# Patient Record
Sex: FEMALE | Race: WHITE | NOT HISPANIC OR LATINO | URBAN - METROPOLITAN AREA
[De-identification: names, ages, dates, MRNs, and addresses within clinical notes are randomized per-mention and may not be internally consistent; named-entity substitution may affect disease eponyms.]

---

## 2017-02-27 ENCOUNTER — INPATIENT (INPATIENT)
Facility: HOSPITAL | Age: 82
LOS: 8 days | Discharge: HOME | DRG: 640 | End: 2017-03-08
Attending: GENERAL ACUTE CARE HOSPITAL | Admitting: GENERAL ACUTE CARE HOSPITAL
Payer: MEDICARE

## 2017-02-27 VITALS
OXYGEN SATURATION: 96 % | DIASTOLIC BLOOD PRESSURE: 81 MMHG | HEART RATE: 80 BPM | TEMPERATURE: 98 F | SYSTOLIC BLOOD PRESSURE: 163 MMHG | RESPIRATION RATE: 20 BRPM

## 2017-02-27 LAB
ALBUMIN SERPL ELPH-MCNC: 3.7 G/DL — SIGNIFICANT CHANGE UP (ref 3.3–5)
ALP SERPL-CCNC: 51 U/L — SIGNIFICANT CHANGE UP (ref 40–120)
ALT FLD-CCNC: 15 U/L RC — SIGNIFICANT CHANGE UP (ref 10–45)
ANION GAP SERPL CALC-SCNC: 12 MMOL/L — SIGNIFICANT CHANGE UP (ref 5–17)
AST SERPL-CCNC: 23 U/L — SIGNIFICANT CHANGE UP (ref 10–40)
BASOPHILS # BLD AUTO: 0 K/UL — SIGNIFICANT CHANGE UP (ref 0–0.2)
BASOPHILS NFR BLD AUTO: 0.3 % — SIGNIFICANT CHANGE UP (ref 0–2)
BILIRUB SERPL-MCNC: 0.1 MG/DL — LOW (ref 0.2–1.2)
BUN SERPL-MCNC: 16 MG/DL — SIGNIFICANT CHANGE UP (ref 7–23)
CALCIUM SERPL-MCNC: 9.5 MG/DL — SIGNIFICANT CHANGE UP (ref 8.4–10.5)
CHLORIDE SERPL-SCNC: 104 MMOL/L — SIGNIFICANT CHANGE UP (ref 96–108)
CO2 SERPL-SCNC: 27 MMOL/L — SIGNIFICANT CHANGE UP (ref 22–31)
CREAT SERPL-MCNC: 0.64 MG/DL — SIGNIFICANT CHANGE UP (ref 0.5–1.3)
EOSINOPHIL # BLD AUTO: 0.2 K/UL — SIGNIFICANT CHANGE UP (ref 0–0.5)
EOSINOPHIL NFR BLD AUTO: 1.7 % — SIGNIFICANT CHANGE UP (ref 0–6)
GLUCOSE SERPL-MCNC: 127 MG/DL — HIGH (ref 70–99)
HCT VFR BLD CALC: 35.6 % — SIGNIFICANT CHANGE UP (ref 34.5–45)
HGB BLD-MCNC: 12.1 G/DL — SIGNIFICANT CHANGE UP (ref 11.5–15.5)
LYMPHOCYTES # BLD AUTO: 1.9 K/UL — SIGNIFICANT CHANGE UP (ref 1–3.3)
LYMPHOCYTES # BLD AUTO: 21.5 % — SIGNIFICANT CHANGE UP (ref 13–44)
MCHC RBC-ENTMCNC: 31.4 PG — SIGNIFICANT CHANGE UP (ref 27–34)
MCHC RBC-ENTMCNC: 33.9 GM/DL — SIGNIFICANT CHANGE UP (ref 32–36)
MCV RBC AUTO: 92.5 FL — SIGNIFICANT CHANGE UP (ref 80–100)
MONOCYTES # BLD AUTO: 0.8 K/UL — SIGNIFICANT CHANGE UP (ref 0–0.9)
MONOCYTES NFR BLD AUTO: 9.4 % — SIGNIFICANT CHANGE UP (ref 2–14)
NEUTROPHILS # BLD AUTO: 5.8 K/UL — SIGNIFICANT CHANGE UP (ref 1.8–7.4)
NEUTROPHILS NFR BLD AUTO: 67.1 % — SIGNIFICANT CHANGE UP (ref 43–77)
PLATELET # BLD AUTO: 219 K/UL — SIGNIFICANT CHANGE UP (ref 150–400)
POTASSIUM SERPL-MCNC: 4.4 MMOL/L — SIGNIFICANT CHANGE UP (ref 3.5–5.3)
POTASSIUM SERPL-SCNC: 4.4 MMOL/L — SIGNIFICANT CHANGE UP (ref 3.5–5.3)
PROT SERPL-MCNC: 6.5 G/DL — SIGNIFICANT CHANGE UP (ref 6–8.3)
RBC # BLD: 3.85 M/UL — SIGNIFICANT CHANGE UP (ref 3.8–5.2)
RBC # FLD: 12.5 % — SIGNIFICANT CHANGE UP (ref 10.3–14.5)
SODIUM SERPL-SCNC: 143 MMOL/L — SIGNIFICANT CHANGE UP (ref 135–145)
WBC # BLD: 8.7 K/UL — SIGNIFICANT CHANGE UP (ref 3.8–10.5)
WBC # FLD AUTO: 8.7 K/UL — SIGNIFICANT CHANGE UP (ref 3.8–10.5)

## 2017-02-27 PROCEDURE — 93010 ELECTROCARDIOGRAM REPORT: CPT

## 2017-02-27 PROCEDURE — 71010: CPT | Mod: 26

## 2017-02-27 PROCEDURE — 99285 EMERGENCY DEPT VISIT HI MDM: CPT | Mod: 25

## 2017-02-27 RX ORDER — SODIUM CHLORIDE 9 MG/ML
1000 INJECTION INTRAMUSCULAR; INTRAVENOUS; SUBCUTANEOUS
Qty: 0 | Refills: 0 | Status: DISCONTINUED | OUTPATIENT
Start: 2017-02-27 | End: 2017-02-28

## 2017-02-27 RX ORDER — ACETAMINOPHEN 500 MG
1000 TABLET ORAL ONCE
Qty: 0 | Refills: 0 | Status: COMPLETED | OUTPATIENT
Start: 2017-02-27 | End: 2017-02-27

## 2017-02-27 NOTE — ED PROVIDER NOTE - OBJECTIVE STATEMENT
86 year old female with past medical history of Diabetes Mellitus Type 2, Hypertension, cerebrovascular accident with right sided weakness presents to the Emergency Department complaining of left sided headache. Patient has an aide from 09:00 to 13:00 and her primary caretaker is her . However, her  is being evaluated in the Emergency Department and so comes in with him because she has no caretaker at home.  Denies fever, chills, chest pain, shortness of breath, abdominal pain, nausea, vomiting, diarrhea, or any other complaints.  PMD: Arslan Estrella ()

## 2017-02-27 NOTE — ED PROVIDER NOTE - NS ED MD SCRIBE ATTENDING SCRIBE SECTIONS
PAST MEDICAL/SURGICAL/SOCIAL HISTORY/HIV/DISPOSITION/VITAL SIGNS( Pullset)/PHYSICAL EXAM/HISTORY OF PRESENT ILLNESS/REVIEW OF SYSTEMS

## 2017-02-27 NOTE — ED PROVIDER NOTE - MEDICAL DECISION MAKING DETAILS
86 year old female presents with headache. Comes in because her  is in the Emergency Department who is her primary caretaker. Plan: CT Head, EKG, Chest X-Ray, labs, LLE US, discuss with PMD, TBA unattached.

## 2017-02-27 NOTE — ED PROVIDER NOTE - NEUROLOGICAL, MLM
Alert and oriented, no focal deficits, no motor or sensory deficits. Alert and oriented, no focal deficits, no motor or sensory deficits. CN grossly intact. 5/5 str to bilateral UE and LE, 5/5 sensory to bilateral UE and LE

## 2017-02-27 NOTE — ED PROVIDER NOTE - PROGRESS NOTE DETAILS
Discussed case with Dr. Gatica who will accept patient under his care. ***Galo Gilmore MD*** Patient will not accept DVT study at this time. Risks, benefits and alternative therapies/imaging have been discussed with patient in detail. Patient declines the therapy/test and is aware of the potential consequences. Patient will follow up with their primary physician.

## 2017-02-27 NOTE — ED PROVIDER NOTE - CARE PLAN
Principal Discharge DX:	Acute nonintractable headache, unspecified headache type  Secondary Diagnosis:	Activity of daily living alteration

## 2017-02-27 NOTE — ED PROVIDER NOTE - DETAILS:
Galo Gilmore MD note: The scribe's documentation has been prepared under my direction and personally reviewed by me.  I confirm that the note above accurately reflects my work, treatment, procedures, and medical decision making.

## 2017-02-27 NOTE — ED PROVIDER NOTE - PSYCHIATRIC, MLM
Alert and oriented to person, place, time/situation. normal mood and affect. Patient with capacity and insight into situation, treatment, risks, benefits, alternative therapies, and understands they can ask any questions if needed.

## 2017-02-27 NOTE — ED PROVIDER NOTE - MUSCULOSKELETAL, MLM
Spine appears normal, range of motion is not limited, no muscle or joint tenderness. Slight LLE swelling greater than RLE.

## 2017-02-28 DIAGNOSIS — R53.81 OTHER MALAISE: ICD-10-CM

## 2017-02-28 DIAGNOSIS — Z98.890 OTHER SPECIFIED POSTPROCEDURAL STATES: Chronic | ICD-10-CM

## 2017-02-28 DIAGNOSIS — Z29.9 ENCOUNTER FOR PROPHYLACTIC MEASURES, UNSPECIFIED: ICD-10-CM

## 2017-02-28 DIAGNOSIS — R51 HEADACHE: ICD-10-CM

## 2017-02-28 DIAGNOSIS — I63.9 CEREBRAL INFARCTION, UNSPECIFIED: ICD-10-CM

## 2017-02-28 DIAGNOSIS — N20.0 CALCULUS OF KIDNEY: Chronic | ICD-10-CM

## 2017-02-28 DIAGNOSIS — R60.0 LOCALIZED EDEMA: ICD-10-CM

## 2017-02-28 DIAGNOSIS — E11.9 TYPE 2 DIABETES MELLITUS WITHOUT COMPLICATIONS: ICD-10-CM

## 2017-02-28 DIAGNOSIS — R63.8 OTHER SYMPTOMS AND SIGNS CONCERNING FOOD AND FLUID INTAKE: ICD-10-CM

## 2017-02-28 LAB — HBA1C BLD-MCNC: 5.5 % — SIGNIFICANT CHANGE UP (ref 4–5.6)

## 2017-02-28 PROCEDURE — 99223 1ST HOSP IP/OBS HIGH 75: CPT

## 2017-02-28 RX ORDER — ACETAMINOPHEN 500 MG
650 TABLET ORAL ONCE
Qty: 0 | Refills: 0 | Status: COMPLETED | OUTPATIENT
Start: 2017-02-28 | End: 2017-02-28

## 2017-02-28 RX ORDER — DEXTROSE 50 % IN WATER 50 %
1 SYRINGE (ML) INTRAVENOUS ONCE
Qty: 0 | Refills: 0 | Status: DISCONTINUED | OUTPATIENT
Start: 2017-02-28 | End: 2017-03-05

## 2017-02-28 RX ORDER — LOSARTAN POTASSIUM 100 MG/1
25 TABLET, FILM COATED ORAL DAILY
Qty: 0 | Refills: 0 | Status: DISCONTINUED | OUTPATIENT
Start: 2017-02-28 | End: 2017-03-05

## 2017-02-28 RX ORDER — DEXTROSE 50 % IN WATER 50 %
25 SYRINGE (ML) INTRAVENOUS ONCE
Qty: 0 | Refills: 0 | Status: DISCONTINUED | OUTPATIENT
Start: 2017-02-28 | End: 2017-03-08

## 2017-02-28 RX ORDER — HEPARIN SODIUM 5000 [USP'U]/ML
5000 INJECTION INTRAVENOUS; SUBCUTANEOUS EVERY 12 HOURS
Qty: 0 | Refills: 0 | Status: DISCONTINUED | OUTPATIENT
Start: 2017-02-28 | End: 2017-03-08

## 2017-02-28 RX ORDER — INSULIN LISPRO 100/ML
VIAL (ML) SUBCUTANEOUS
Qty: 0 | Refills: 0 | Status: DISCONTINUED | OUTPATIENT
Start: 2017-02-28 | End: 2017-03-05

## 2017-02-28 RX ORDER — NYSTATIN CREAM 100000 [USP'U]/G
1 CREAM TOPICAL EVERY 8 HOURS
Qty: 0 | Refills: 0 | Status: DISCONTINUED | OUTPATIENT
Start: 2017-02-28 | End: 2017-03-08

## 2017-02-28 RX ORDER — DEXTROSE 50 % IN WATER 50 %
12.5 SYRINGE (ML) INTRAVENOUS ONCE
Qty: 0 | Refills: 0 | Status: DISCONTINUED | OUTPATIENT
Start: 2017-02-28 | End: 2017-03-08

## 2017-02-28 RX ORDER — DOCUSATE SODIUM 100 MG
100 CAPSULE ORAL THREE TIMES A DAY
Qty: 0 | Refills: 0 | Status: DISCONTINUED | OUTPATIENT
Start: 2017-02-28 | End: 2017-03-08

## 2017-02-28 RX ORDER — SIMVASTATIN 20 MG/1
20 TABLET, FILM COATED ORAL AT BEDTIME
Qty: 0 | Refills: 0 | Status: DISCONTINUED | OUTPATIENT
Start: 2017-02-28 | End: 2017-03-02

## 2017-02-28 RX ORDER — SENNA PLUS 8.6 MG/1
2 TABLET ORAL AT BEDTIME
Qty: 0 | Refills: 0 | Status: DISCONTINUED | OUTPATIENT
Start: 2017-02-28 | End: 2017-03-06

## 2017-02-28 RX ORDER — INSULIN LISPRO 100/ML
VIAL (ML) SUBCUTANEOUS AT BEDTIME
Qty: 0 | Refills: 0 | Status: DISCONTINUED | OUTPATIENT
Start: 2017-02-28 | End: 2017-03-05

## 2017-02-28 RX ORDER — CLOPIDOGREL BISULFATE 75 MG/1
75 TABLET, FILM COATED ORAL DAILY
Qty: 0 | Refills: 0 | Status: DISCONTINUED | OUTPATIENT
Start: 2017-02-28 | End: 2017-03-08

## 2017-02-28 RX ORDER — GLUCAGON INJECTION, SOLUTION 0.5 MG/.1ML
1 INJECTION, SOLUTION SUBCUTANEOUS ONCE
Qty: 0 | Refills: 0 | Status: DISCONTINUED | OUTPATIENT
Start: 2017-02-28 | End: 2017-03-05

## 2017-02-28 RX ORDER — SODIUM CHLORIDE 9 MG/ML
1000 INJECTION, SOLUTION INTRAVENOUS
Qty: 0 | Refills: 0 | Status: DISCONTINUED | OUTPATIENT
Start: 2017-02-28 | End: 2017-03-05

## 2017-02-28 RX ORDER — DEXTROSE 50 % IN WATER 50 %
25 SYRINGE (ML) INTRAVENOUS ONCE
Qty: 0 | Refills: 0 | Status: DISCONTINUED | OUTPATIENT
Start: 2017-02-28 | End: 2017-03-05

## 2017-02-28 RX ADMIN — NYSTATIN CREAM 1 APPLICATION(S): 100000 CREAM TOPICAL at 05:33

## 2017-02-28 RX ADMIN — HEPARIN SODIUM 5000 UNIT(S): 5000 INJECTION INTRAVENOUS; SUBCUTANEOUS at 17:43

## 2017-02-28 RX ADMIN — Medication 1 TABLET(S): at 17:43

## 2017-02-28 RX ADMIN — NYSTATIN CREAM 1 APPLICATION(S): 100000 CREAM TOPICAL at 21:12

## 2017-02-28 RX ADMIN — HEPARIN SODIUM 5000 UNIT(S): 5000 INJECTION INTRAVENOUS; SUBCUTANEOUS at 05:33

## 2017-02-28 RX ADMIN — CLOPIDOGREL BISULFATE 75 MILLIGRAM(S): 75 TABLET, FILM COATED ORAL at 12:50

## 2017-02-28 RX ADMIN — Medication 650 MILLIGRAM(S): at 06:19

## 2017-02-28 RX ADMIN — NYSTATIN CREAM 1 APPLICATION(S): 100000 CREAM TOPICAL at 12:51

## 2017-02-28 RX ADMIN — LOSARTAN POTASSIUM 25 MILLIGRAM(S): 100 TABLET, FILM COATED ORAL at 12:50

## 2017-02-28 RX ADMIN — Medication 650 MILLIGRAM(S): at 07:00

## 2017-02-28 NOTE — H&P ADULT. - PROBLEM SELECTOR PLAN 1
--suspect dehydration and fatigue, now resolved  --tylenol PRN  --no focal findings on exam  --CT head unremarkable --suspect dehydration and fatigue, now resolved  --patient refused IV fluuids  --tylenol PRN  --no focal findings on exam  --CT head unremarkable

## 2017-02-28 NOTE — H&P ADULT. - ASSESSMENT
This is an 86 year old woman with history of L MYRIAM stroke 7/2015 with residual R sided weakness and pain, type 2 diabetes mellitus presenting with L sided HA.  Suspect HA 2/2 dehydration, fatigue, improved with sleep.  CT head negative.

## 2017-02-28 NOTE — H&P ADULT. - HISTORY OF PRESENT ILLNESS
This is an 86 year old woman with history of L MYRIAM stroke 7/2015 with residual R sided weakness and pain, type 2 diabetes mellitus presenting with L sided HA.  Patient's  has been ill the last week and was admitted today for syncope.  Patient notes that this has been a stressful time, but she has been sleeping and eating normally.  After being in ED for hours with her , patient developed L sided HA which patient describes as 4/10 L sided HA without vision changes, nausea, vomiting, or worsening R sided weakness.  Patient was offered fluids and tylenol in ED but refused.  On my evaluation, patient notes complete resolution of her HA after having slept in ED.  Otherwise, patient has been in usual SOH, rest of ROS negative.     Notably, patient is completely dependent in ADLs 2/2 weakness, reliant on 4h/day HHA and .  She is unable to care for herself without her , who is now being admitted.      In ED, afebrile, HR 80s, -180/60-70, satting well on RA.  Labs notable for normal CBC, normal Cr.  CT head with old MYRIAM infarct, no new stroke.

## 2017-02-28 NOTE — H&P ADULT. - PROBLEM SELECTOR PLAN 2
--patient unable to attend to EDLs without   --PT lilo in AM --suspect 2/2 muscle wasting of R leg  --patient refused LLE ultrasound

## 2017-03-01 LAB
ALBUMIN SERPL ELPH-MCNC: 3.5 G/DL — SIGNIFICANT CHANGE UP (ref 3.3–5)
ALP SERPL-CCNC: 48 U/L — SIGNIFICANT CHANGE UP (ref 40–120)
ALT FLD-CCNC: 13 U/L — SIGNIFICANT CHANGE UP (ref 10–45)
ANION GAP SERPL CALC-SCNC: 13 MMOL/L — SIGNIFICANT CHANGE UP (ref 5–17)
AST SERPL-CCNC: 27 U/L — SIGNIFICANT CHANGE UP (ref 10–40)
BASOPHILS # BLD AUTO: 0.03 K/UL — SIGNIFICANT CHANGE UP (ref 0–0.2)
BASOPHILS NFR BLD AUTO: 0.5 % — SIGNIFICANT CHANGE UP (ref 0–2)
BILIRUB SERPL-MCNC: 0.3 MG/DL — SIGNIFICANT CHANGE UP (ref 0.2–1.2)
BUN SERPL-MCNC: 16 MG/DL — SIGNIFICANT CHANGE UP (ref 7–23)
CALCIUM SERPL-MCNC: 9.5 MG/DL — SIGNIFICANT CHANGE UP (ref 8.4–10.5)
CHLORIDE SERPL-SCNC: 104 MMOL/L — SIGNIFICANT CHANGE UP (ref 96–108)
CHOLEST SERPL-MCNC: 224 MG/DL — HIGH (ref 10–199)
CO2 SERPL-SCNC: 23 MMOL/L — SIGNIFICANT CHANGE UP (ref 22–31)
CREAT SERPL-MCNC: 0.42 MG/DL — LOW (ref 0.5–1.3)
EOSINOPHIL # BLD AUTO: 0.16 K/UL — SIGNIFICANT CHANGE UP (ref 0–0.5)
EOSINOPHIL NFR BLD AUTO: 2.7 % — SIGNIFICANT CHANGE UP (ref 0–6)
GLUCOSE SERPL-MCNC: 104 MG/DL — HIGH (ref 70–99)
HCT VFR BLD CALC: 36.4 % — SIGNIFICANT CHANGE UP (ref 34.5–45)
HDLC SERPL-MCNC: 100 MG/DL — SIGNIFICANT CHANGE UP (ref 40–125)
HGB BLD-MCNC: 11.6 G/DL — SIGNIFICANT CHANGE UP (ref 11.5–15.5)
IMM GRANULOCYTES NFR BLD AUTO: 0.2 % — SIGNIFICANT CHANGE UP (ref 0–1.5)
LIPID PNL WITH DIRECT LDL SERPL: 103 MG/DL — SIGNIFICANT CHANGE UP
LYMPHOCYTES # BLD AUTO: 1.95 K/UL — SIGNIFICANT CHANGE UP (ref 1–3.3)
LYMPHOCYTES # BLD AUTO: 33.3 % — SIGNIFICANT CHANGE UP (ref 13–44)
MCHC RBC-ENTMCNC: 29.9 PG — SIGNIFICANT CHANGE UP (ref 27–34)
MCHC RBC-ENTMCNC: 31.9 GM/DL — LOW (ref 32–36)
MCV RBC AUTO: 93.8 FL — SIGNIFICANT CHANGE UP (ref 80–100)
MONOCYTES # BLD AUTO: 0.52 K/UL — SIGNIFICANT CHANGE UP (ref 0–0.9)
MONOCYTES NFR BLD AUTO: 8.9 % — SIGNIFICANT CHANGE UP (ref 2–14)
NEUTROPHILS # BLD AUTO: 3.19 K/UL — SIGNIFICANT CHANGE UP (ref 1.8–7.4)
NEUTROPHILS NFR BLD AUTO: 54.4 % — SIGNIFICANT CHANGE UP (ref 43–77)
PLATELET # BLD AUTO: 226 K/UL — SIGNIFICANT CHANGE UP (ref 150–400)
POTASSIUM SERPL-MCNC: 4.5 MMOL/L — SIGNIFICANT CHANGE UP (ref 3.5–5.3)
POTASSIUM SERPL-SCNC: 4.5 MMOL/L — SIGNIFICANT CHANGE UP (ref 3.5–5.3)
PROT SERPL-MCNC: 6.1 G/DL — SIGNIFICANT CHANGE UP (ref 6–8.3)
RBC # BLD: 3.88 M/UL — SIGNIFICANT CHANGE UP (ref 3.8–5.2)
RBC # FLD: 13.8 % — SIGNIFICANT CHANGE UP (ref 10.3–14.5)
SODIUM SERPL-SCNC: 140 MMOL/L — SIGNIFICANT CHANGE UP (ref 135–145)
TOTAL CHOLESTEROL/HDL RATIO MEASUREMENT: 2.2 RATIO — LOW (ref 3.3–7.1)
TRIGL SERPL-MCNC: 103 MG/DL — SIGNIFICANT CHANGE UP (ref 10–149)
WBC # BLD: 5.86 K/UL — SIGNIFICANT CHANGE UP (ref 3.8–10.5)
WBC # FLD AUTO: 5.86 K/UL — SIGNIFICANT CHANGE UP (ref 3.8–10.5)

## 2017-03-01 PROCEDURE — 93971 EXTREMITY STUDY: CPT | Mod: 26

## 2017-03-01 RX ORDER — ACETAMINOPHEN 500 MG
650 TABLET ORAL EVERY 6 HOURS
Qty: 0 | Refills: 0 | Status: DISCONTINUED | OUTPATIENT
Start: 2017-03-01 | End: 2017-03-08

## 2017-03-01 RX ORDER — PETROLATUM,WHITE
1 JELLY (GRAM) TOPICAL
Qty: 0 | Refills: 0 | Status: DISCONTINUED | OUTPATIENT
Start: 2017-03-01 | End: 2017-03-08

## 2017-03-01 RX ADMIN — HEPARIN SODIUM 5000 UNIT(S): 5000 INJECTION INTRAVENOUS; SUBCUTANEOUS at 17:36

## 2017-03-01 RX ADMIN — CLOPIDOGREL BISULFATE 75 MILLIGRAM(S): 75 TABLET, FILM COATED ORAL at 12:28

## 2017-03-01 RX ADMIN — NYSTATIN CREAM 1 APPLICATION(S): 100000 CREAM TOPICAL at 21:35

## 2017-03-01 RX ADMIN — Medication 1 APPLICATION(S): at 17:36

## 2017-03-01 RX ADMIN — LOSARTAN POTASSIUM 25 MILLIGRAM(S): 100 TABLET, FILM COATED ORAL at 05:59

## 2017-03-01 RX ADMIN — HEPARIN SODIUM 5000 UNIT(S): 5000 INJECTION INTRAVENOUS; SUBCUTANEOUS at 05:59

## 2017-03-01 RX ADMIN — Medication 1 TABLET(S): at 05:59

## 2017-03-01 RX ADMIN — Medication 650 MILLIGRAM(S): at 12:28

## 2017-03-01 RX ADMIN — NYSTATIN CREAM 1 APPLICATION(S): 100000 CREAM TOPICAL at 05:59

## 2017-03-01 NOTE — PHYSICAL THERAPY INITIAL EVALUATION ADULT - ASR WT BEARING STATUS EVAL
no weight-bearing restrictions L LE Doppler (-) DVT.CT Head: No acute intercranial hemorrhage, mass effect, or CT evidence of an acute vascular territorial infarct.Old left MYRIAM territory infarct and mild chronic ischemic changes in the frontoparietal white matter.If clinical suspicion persists for acute infarct, can obtain MRI for  further evaluation./no weight-bearing restrictions

## 2017-03-01 NOTE — PHYSICAL THERAPY INITIAL EVALUATION ADULT - ACTIVE RANGE OF MOTION EXAMINATION, REHAB EVAL
R shld to 0-90 limited secondary to pain/bilateral  lower extremity Active ROM was WFL (within functional limits)/bilateral upper extremity Active ROM was WFL (within functional limits)

## 2017-03-01 NOTE — PHYSICAL THERAPY INITIAL EVALUATION ADULT - PRECAUTIONS/LIMITATIONS, REHAB EVAL
fall precautions/After being in ED for hours with her , patient developed L sided HA which patient describes as 4/10 L sided HA without vision changes, nausea, vomiting, or worsening R sided weakness. HA resolved in ED but admitted secondary to  admitted and unable to care for patient.

## 2017-03-01 NOTE — PHYSICAL THERAPY INITIAL EVALUATION ADULT - ADDITIONAL COMMENTS
per pt, lives with  in 2nd floor co-op with elevator with 2 steps to enter with rail; pt used RW for ambulation with supervision; requires assist of HHA (4hrs/7days) for showering, dressing, all ADLs; has shower chair.

## 2017-03-01 NOTE — PHYSICAL THERAPY INITIAL EVALUATION ADULT - DISCHARGE DISPOSITION, PT EVAL
rehabilitation facility/subacute; if home will require wheelchair and ambulette transport into house

## 2017-03-01 NOTE — PHYSICAL THERAPY INITIAL EVALUATION ADULT - PERTINENT HX OF CURRENT PROBLEM, REHAB EVAL
This is an 86 year old woman with history of L MYRIAM stroke 7/2015 with residual R sided weakness and pain, type 2 diabetes mellitus presenting with L sided HA. Patient's  has been ill the last week and was admitted today for syncope.  Patient notes that this has been a stressful time, but she has been sleeping and eating normally. Cont

## 2017-03-01 NOTE — PHYSICAL THERAPY INITIAL EVALUATION ADULT - GAIT DEVIATIONS NOTED, PT EVAL
decreased step length/decreased weight-shifting ability/decreased cass/decreased R toe clearance, swing phase; hx of R sided weakness from old CVA/decreased stride length

## 2017-03-02 ENCOUNTER — TRANSCRIPTION ENCOUNTER (OUTPATIENT)
Age: 82
End: 2017-03-02

## 2017-03-02 RX ORDER — SIMVASTATIN 20 MG/1
20 TABLET, FILM COATED ORAL AT BEDTIME
Qty: 0 | Refills: 0 | Status: DISCONTINUED | OUTPATIENT
Start: 2017-03-02 | End: 2017-03-08

## 2017-03-02 RX ADMIN — Medication 1 APPLICATION(S): at 17:08

## 2017-03-02 RX ADMIN — LOSARTAN POTASSIUM 25 MILLIGRAM(S): 100 TABLET, FILM COATED ORAL at 05:40

## 2017-03-02 RX ADMIN — NYSTATIN CREAM 1 APPLICATION(S): 100000 CREAM TOPICAL at 05:39

## 2017-03-02 RX ADMIN — Medication 100 MILLIGRAM(S): at 05:40

## 2017-03-02 RX ADMIN — Medication 1 APPLICATION(S): at 05:40

## 2017-03-02 RX ADMIN — Medication 650 MILLIGRAM(S): at 11:38

## 2017-03-02 RX ADMIN — NYSTATIN CREAM 1 APPLICATION(S): 100000 CREAM TOPICAL at 14:07

## 2017-03-02 RX ADMIN — CLOPIDOGREL BISULFATE 75 MILLIGRAM(S): 75 TABLET, FILM COATED ORAL at 11:38

## 2017-03-02 NOTE — DISCHARGE NOTE ADULT - HOSPITAL COURSE
to be completed by attending physician on record 86 year old woman with history of L MYRIAM stroke 7/2015 with residual R sided weakness and pain, type 2 diabetes mellitus presenting with L sided HA. patient is completely dependent in ADLs 2/2 weakness, reliant on 4h/day HHA and .  She is unable to care for herself without her , who is now being admitted.  In ED, afebrile, HR 80s, -180/60-70, satting well on RA.  Labs notable for normal CBC, normal Cr.  CT head with old MYRIAM infarct, no new stroke.    Pt was admitted for HTN urgency. medications were adjusted , BP was controlled. HA resolved . 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 pt was evaluated by PT and rehab was recommneded. pt was discharged to rehab in stable condition.

## 2017-03-02 NOTE — DISCHARGE NOTE ADULT - MEDICATION SUMMARY - MEDICATIONS TO CHANGE
I will SWITCH the dose or number of times a day I take the medications listed below when I get home from the hospital:    atorvastatin 80 mg oral tablet  -- 1 tab(s) by mouth once a day (at bedtime)

## 2017-03-02 NOTE — DISCHARGE NOTE ADULT - PLAN OF CARE
optimal functional status PT/OT as tolerated  safety precaustions HgA1C this admission was 5.5%  Make sure you get your HgA1c checked every three months.  If you take oral diabetes medications, check your blood glucose two times a day.  It's important not to skip any meals.  Keep a log of your blood glucose results and always take it with you to your doctor appointments.  Keep a list of your current medications including injectables and over the counter medications and bring this medication list with you to all your doctor appointments.  If you have not seen your opthalmologist this year call for appointment.  Check your feet daily for redness, sores, or openings. Do not self treat. If no improvement in two days call your primary care physician for an appointment.  Low blood sugar (hypoglycemia) is a blood sugar below 70mg/dl. Check your blood sugar if you feel signs/symptoms of hypoglycemia. If your blood sugar is below 70 take 15 grams of carbohydrates (ex 4 oz of apple juice, 3-4 glucosr tablets, or 4-6 oz of regular soda) wait 15 minutes and repeat blood sugar to make sure it comes up above 70.  If your blood sugar is above 70 and you are due for a meal, have a meal.  If you are not due for a meal have a snack.  This snack helps keeps your blood sugar at a safe range. venous dopplers LE negative for DVT Tylenol ATC & prn Oxycodone for pain management  calcium supplement as directed standard precautions since diagnosed with coronovirus  adequate hydration PT/OT as tolerated  safety precautions

## 2017-03-02 NOTE — DISCHARGE NOTE ADULT - PATIENT PORTAL LINK FT
“You can access the Follow[employer] Patient Portal, offered by [facility], by registering with the following website: [email or web address]”

## 2017-03-02 NOTE — DISCHARGE NOTE ADULT - MEDICATION SUMMARY - MEDICATIONS TO STOP TAKING
I will STOP taking the medications listed below when I get home from the hospital:    bisacodyl 5 mg oral delayed release tablet  -- 1 tab(s) by mouth every 12 hours, As needed, Constipation    polyethylene glycol 3350 oral powder for reconstitution  -- 17 gram(s) by mouth once a day

## 2017-03-02 NOTE — DISCHARGE NOTE ADULT - CARE PROVIDER_API CALL
Curry Gatica), Internal Medicine  [address] [place] [address]  Phone: [phone]  Fax: [phone]    Сергей Reynoso), Cardiovascular Disease; Internal Medicine  [address] [place] [address]  Phone: [phone]  Fax: [phone]

## 2017-03-02 NOTE — DISCHARGE NOTE ADULT - MEDICATION SUMMARY - MEDICATIONS TO TAKE
I will START or STAY ON the medications listed below when I get home from the hospital:    acetaminophen 325 mg oral tablet  -- 2 tab(s) by mouth every 6 hours  -- Indication: For Pain management    oxyCODONE 5 mg oral tablet  -- 1 tab(s) by mouth every 8 hours, As needed, Moderate Pain (4 - 6)  -- Indication: For Pain management    losartan 50 mg oral tablet  -- 1 tab(s) by mouth once a day  -- Indication: For Hypertension    Januvia 50 mg oral tablet  -- 1 tab(s) by mouth once a day  -- Indication: For diabetes    simvastatin 20 mg oral tablet  -- 1 tab(s) by mouth once a day (at bedtime)  -- Indication: For Hyperlipidemia    clopidogrel 75 mg oral tablet  -- 1 tab(s) by mouth once a day  -- Indication: For Stroke history    petrolatum topical ointment  -- 1 application on skin 2 times a day to bilateral legs  -- Indication: For Skin emollient    nystatin 100,000 units/g topical powder  -- 1 application on skin every 8 hours to perineum  -- Indication: For Skin protection    senna oral tablet  -- 2 tab(s) by mouth once a day (at bedtime)  -- Indication: For constipation    docusate sodium 100 mg oral capsule  -- 1 cap(s) by mouth 3 times a day  -- Indication: For constipation    pantoprazole 40 mg oral delayed release tablet  -- 1 tab(s) by mouth 2 times a day (1/2 hour before breakfast and dinner)  -- Indication: For Stomach protection    calcium (as carbonate)-vitamin D 250 mg-125 intl units oral tablet  -- 1 tab(s) by mouth 2 times a day  -- Indication: For calcium supplement I will START or STAY ON the medications listed below when I get home from the hospital:    acetaminophen 325 mg oral tablet  -- 2 tab(s) by mouth every 6 hours  -- Indication: For Pain management    oxyCODONE 5 mg oral tablet  -- 1 tab(s) by mouth every 8 hours, As needed, Moderate Pain (4 - 6)  -- Indication: For Pain management    losartan 50 mg oral tablet  -- 1 tab(s) by mouth once a day  -- Indication: For Hypertension    Januvia 50 mg oral tablet  -- 1 tab(s) by mouth once a day  -- Indication: For diabetes    simvastatin 20 mg oral tablet  -- 1 tab(s) by mouth once a day (at bedtime)  -- Indication: For Hyperlipidemia    clopidogrel 75 mg oral tablet  -- 1 tab(s) by mouth once a day  -- Indication: For Stroke history    petrolatum topical ointment  -- 1 application on skin 2 times a day to bilateral legs  -- Indication: For Skin emollient    nystatin 100,000 units/g topical powder  -- 1 application on skin every 8 hours to perineum  -- Indication: For Skin protection    guaiFENesin 100 mg/5 mL oral liquid  -- 10 milliliter(s) by mouth every 6 hours  -- Indication: For Cough    senna oral tablet  -- 2 tab(s) by mouth once a day (at bedtime)  -- Indication: For constipation    docusate sodium 100 mg oral capsule  -- 1 cap(s) by mouth 3 times a day  -- Indication: For constipation    pantoprazole 40 mg oral delayed release tablet  -- 1 tab(s) by mouth 2 times a day (1/2 hour before breakfast and dinner)  -- Indication: For Stomach protection    calcium (as carbonate)-vitamin D 250 mg-125 intl units oral tablet  -- 1 tab(s) by mouth 2 times a day  -- Indication: For calcium supplement

## 2017-03-02 NOTE — DISCHARGE NOTE ADULT - ADDITIONAL INSTRUCTIONS
follow up care as per subacute rehab facility protocol  follow up care with primary care physician after rehab stay

## 2017-03-02 NOTE — DISCHARGE NOTE ADULT - CARE PLAN
Principal Discharge DX:	Ambulatory dysfunction  Goal:	optimal functional status  Instructions for follow-up, activity and diet:	PT/OT as tolerated  safety precaustions  Secondary Diagnosis:	Type 2 diabetes mellitus  Instructions for follow-up, activity and diet:	HgA1C this admission was 5.5%  Make sure you get your HgA1c checked every three months.  If you take oral diabetes medications, check your blood glucose two times a day.  It's important not to skip any meals.  Keep a log of your blood glucose results and always take it with you to your doctor appointments.  Keep a list of your current medications including injectables and over the counter medications and bring this medication list with you to all your doctor appointments.  If you have not seen your opthalmologist this year call for appointment.  Check your feet daily for redness, sores, or openings. Do not self treat. If no improvement in two days call your primary care physician for an appointment.  Low blood sugar (hypoglycemia) is a blood sugar below 70mg/dl. Check your blood sugar if you feel signs/symptoms of hypoglycemia. If your blood sugar is below 70 take 15 grams of carbohydrates (ex 4 oz of apple juice, 3-4 glucosr tablets, or 4-6 oz of regular soda) wait 15 minutes and repeat blood sugar to make sure it comes up above 70.  If your blood sugar is above 70 and you are due for a meal, have a meal.  If you are not due for a meal have a snack.  This snack helps keeps your blood sugar at a safe range.  Secondary Diagnosis:	Edema of left lower extremity  Instructions for follow-up, activity and diet:	venous dopplers LE negative for DVT  Secondary Diagnosis:	Osteoarthritis  Instructions for follow-up, activity and diet:	Tylenol ATC & prn Oxycodone for pain management  calcium supplement as directed  Secondary Diagnosis:	Viral syndrome  Instructions for follow-up, activity and diet:	standard precautions since diagnosed with coronovirus  adequate hydration Principal Discharge DX:	Ambulatory dysfunction  Goal:	optimal functional status  Instructions for follow-up, activity and diet:	PT/OT as tolerated  safety precautions  Secondary Diagnosis:	Type 2 diabetes mellitus  Instructions for follow-up, activity and diet:	HgA1C this admission was 5.5%  Make sure you get your HgA1c checked every three months.  If you take oral diabetes medications, check your blood glucose two times a day.  It's important not to skip any meals.  Keep a log of your blood glucose results and always take it with you to your doctor appointments.  Keep a list of your current medications including injectables and over the counter medications and bring this medication list with you to all your doctor appointments.  If you have not seen your opthalmologist this year call for appointment.  Check your feet daily for redness, sores, or openings. Do not self treat. If no improvement in two days call your primary care physician for an appointment.  Low blood sugar (hypoglycemia) is a blood sugar below 70mg/dl. Check your blood sugar if you feel signs/symptoms of hypoglycemia. If your blood sugar is below 70 take 15 grams of carbohydrates (ex 4 oz of apple juice, 3-4 glucosr tablets, or 4-6 oz of regular soda) wait 15 minutes and repeat blood sugar to make sure it comes up above 70.  If your blood sugar is above 70 and you are due for a meal, have a meal.  If you are not due for a meal have a snack.  This snack helps keeps your blood sugar at a safe range.  Secondary Diagnosis:	Edema of left lower extremity  Instructions for follow-up, activity and diet:	venous dopplers LE negative for DVT  Secondary Diagnosis:	Osteoarthritis  Instructions for follow-up, activity and diet:	Tylenol ATC & prn Oxycodone for pain management  calcium supplement as directed  Secondary Diagnosis:	Viral syndrome  Instructions for follow-up, activity and diet:	standard precautions since diagnosed with coronovirus  adequate hydration

## 2017-03-03 PROCEDURE — 73560 X-RAY EXAM OF KNEE 1 OR 2: CPT | Mod: 26,RT

## 2017-03-03 PROCEDURE — 73030 X-RAY EXAM OF SHOULDER: CPT | Mod: 26,RT

## 2017-03-03 PROCEDURE — 73502 X-RAY EXAM HIP UNI 2-3 VIEWS: CPT | Mod: 26,RT

## 2017-03-03 RX ORDER — OXYCODONE HYDROCHLORIDE 5 MG/1
2.5 TABLET ORAL EVERY 12 HOURS
Qty: 0 | Refills: 0 | Status: DISCONTINUED | OUTPATIENT
Start: 2017-03-03 | End: 2017-03-04

## 2017-03-03 RX ADMIN — OXYCODONE HYDROCHLORIDE 2.5 MILLIGRAM(S): 5 TABLET ORAL at 18:17

## 2017-03-03 RX ADMIN — Medication 650 MILLIGRAM(S): at 12:01

## 2017-03-03 RX ADMIN — Medication 1 TABLET(S): at 18:10

## 2017-03-03 RX ADMIN — HEPARIN SODIUM 5000 UNIT(S): 5000 INJECTION INTRAVENOUS; SUBCUTANEOUS at 18:10

## 2017-03-03 RX ADMIN — Medication 1 APPLICATION(S): at 18:09

## 2017-03-03 RX ADMIN — OXYCODONE HYDROCHLORIDE 2.5 MILLIGRAM(S): 5 TABLET ORAL at 18:16

## 2017-03-03 RX ADMIN — CLOPIDOGREL BISULFATE 75 MILLIGRAM(S): 75 TABLET, FILM COATED ORAL at 12:05

## 2017-03-03 RX ADMIN — Medication 100 MILLIGRAM(S): at 21:51

## 2017-03-04 RX ORDER — OXYCODONE HYDROCHLORIDE 5 MG/1
5 TABLET ORAL EVERY 12 HOURS
Qty: 0 | Refills: 0 | Status: DISCONTINUED | OUTPATIENT
Start: 2017-03-04 | End: 2017-03-06

## 2017-03-04 RX ORDER — PANTOPRAZOLE SODIUM 20 MG/1
40 TABLET, DELAYED RELEASE ORAL
Qty: 0 | Refills: 0 | Status: DISCONTINUED | OUTPATIENT
Start: 2017-03-04 | End: 2017-03-08

## 2017-03-04 RX ADMIN — OXYCODONE HYDROCHLORIDE 2.5 MILLIGRAM(S): 5 TABLET ORAL at 18:15

## 2017-03-04 RX ADMIN — Medication 100 MILLIGRAM(S): at 13:07

## 2017-03-04 RX ADMIN — Medication 100 MILLIGRAM(S): at 05:56

## 2017-03-04 RX ADMIN — CLOPIDOGREL BISULFATE 75 MILLIGRAM(S): 75 TABLET, FILM COATED ORAL at 13:07

## 2017-03-04 RX ADMIN — NYSTATIN CREAM 1 APPLICATION(S): 100000 CREAM TOPICAL at 13:08

## 2017-03-04 RX ADMIN — NYSTATIN CREAM 1 APPLICATION(S): 100000 CREAM TOPICAL at 21:06

## 2017-03-04 RX ADMIN — OXYCODONE HYDROCHLORIDE 2.5 MILLIGRAM(S): 5 TABLET ORAL at 18:18

## 2017-03-04 RX ADMIN — Medication 1 APPLICATION(S): at 05:56

## 2017-03-04 RX ADMIN — OXYCODONE HYDROCHLORIDE 2.5 MILLIGRAM(S): 5 TABLET ORAL at 05:56

## 2017-03-04 RX ADMIN — Medication 1 TABLET(S): at 18:17

## 2017-03-04 RX ADMIN — OXYCODONE HYDROCHLORIDE 2.5 MILLIGRAM(S): 5 TABLET ORAL at 06:24

## 2017-03-04 RX ADMIN — Medication 1 APPLICATION(S): at 18:17

## 2017-03-04 NOTE — DIETITIAN INITIAL EVALUATION ADULT. - PROBLEM SELECTOR PLAN 1
--suspect dehydration and fatigue, now resolved  --patient refused IV fluuids  --tylenol PRN  --no focal findings on exam  --CT head unremarkable

## 2017-03-04 NOTE — DIETITIAN INITIAL EVALUATION ADULT. - ADHERENCE
Pt reports diligent adherence to a low sodium and low sugar diet PTA given her hx of DM and stroke./good

## 2017-03-04 NOTE — DIETITIAN INITIAL EVALUATION ADULT. - NS AS NUTRI INTERV MEALS SNACK
1) Per resident request add low sodium diet, continue consistent CHO with evening snack (discussed with NP) 2) Add yogurt as morning snack + all breakfast meals, fresh fruit as PM snack, and continue with evening snack/sandwich. 3) Encourage PO intake and provide feeding-assist with meals/snacks 4) Monitor amenability to oral supplements  5) Monitor PO intake/tolerance, wts, skin.  Malnutrition sticker placed in chart and signed by NP

## 2017-03-04 NOTE — DIETITIAN INITIAL EVALUATION ADULT. - ORAL INTAKE PTA
Pt reports fair PO intake per baseline with an overall general decline in her functional status as well as appetite following her stroke in 2015. States that prior to admit she consumed 2 meals/day, one in the morning and one later on in the evening. Usual intake PTA would include a vegetable omelette, yogurt, and fresh fruit for the morning meal; then soup, coleslaw, and chicken salad and tomato sandwich for the evening meal. Pt is edentulous, states she prefers soft foods but is not interested in a soft diet because she "does just fine with hard cookies." She reports taking Januvia PTA; she is unsure of which vitamin/mineral supplements she was taking./fair

## 2017-03-04 NOTE — DIETITIAN INITIAL EVALUATION ADULT. - OTHER INFO
Pt alert and oriented, however per cognitive hx she is at times forgetful. Reports general fair PO intake per baseline approx 2 years with dependence on feeding assistance due to hx of stroke. ~50% of breakfast consumed this morning. Pt reports living with her  PTA and has a HHA 4hours/day to help with food prep and assistance. Per pt her  was taking care of her mostly PTA, they have no living children.  also admit to hospital and its here at this time in the adjacent window bed; pt reports he is quite debilitated himself. Pt appears underwt, reports UBW 80 lbs just PTA which she feels is more accurate than recently recorded wt of 88 lbs. UBW ~2 years ago 108 lbs prior to stroke. She reports food intolerance to bananas (induces N/V). Denies N/V/D at this time, admits to constipation, she is unsure of last BM. Last BM recorded in flowsheet 2/28. Despite underwt appearance and hx of wt loss, pt not amenable to oral supplements as she reports getting "violently ill" from Ensure Plus supplements in the past. Other supplements offered; she is not willing to try at this time. Instead pt willing to add between-meal snacks of yogurt, fruit and sandwiches to promote optimal PO intake.

## 2017-03-04 NOTE — DIETITIAN INITIAL EVALUATION ADULT. - ENERGY NEEDS
ht: 57 inches. wt: 88.8 lbs. BMI: 19.2. UBW: 108 lbs. ~2 years ago, 80 lbs just PTA; IBW: 85 lbs +/- 10%. % IBW using dosing wt: 104%. No edema at this time (improved from trace edema left leg 3/2), no pressure ulcers.  Other pertinent objective information: 86 year old female resident with PMHx of L MYRIAM stroke 7/2015 (+) residual R sided weakness and pain, ?borderline DM, HTN, MD documented malnutrition admit with L sided HA. Head CT negative for new bleed; rt hip, shoulder, and knee X ray done 3/3, pending results. SW ordered for consult to assess adequacy of current living situation given both pt and pt's 's functional status.

## 2017-03-04 NOTE — DIETITIAN INITIAL EVALUATION ADULT. - FACTORS AFF FOOD INTAKE
other (specify)/General decline following stroke in 2015; mostly dependent for ADLs and requests feeding assistance.  Pt reports difficulty swallowing "at times" however throughout interview would say she's fine and has no difficulty swallowing solids or thin liquids. Moreover she states she is not interested in diet consistency modifications or in seeing a SLP for assessment./difficulty feeding self

## 2017-03-05 LAB
ANION GAP SERPL CALC-SCNC: 15 MMOL/L — SIGNIFICANT CHANGE UP (ref 5–17)
BUN SERPL-MCNC: 19 MG/DL — SIGNIFICANT CHANGE UP (ref 7–23)
CALCIUM SERPL-MCNC: 9.7 MG/DL — SIGNIFICANT CHANGE UP (ref 8.4–10.5)
CHLORIDE SERPL-SCNC: 106 MMOL/L — SIGNIFICANT CHANGE UP (ref 96–108)
CO2 SERPL-SCNC: 23 MMOL/L — SIGNIFICANT CHANGE UP (ref 22–31)
CREAT SERPL-MCNC: 0.52 MG/DL — SIGNIFICANT CHANGE UP (ref 0.5–1.3)
GLUCOSE SERPL-MCNC: 92 MG/DL — SIGNIFICANT CHANGE UP (ref 70–99)
HCT VFR BLD CALC: 35.5 % — SIGNIFICANT CHANGE UP (ref 34.5–45)
HGB BLD-MCNC: 11.5 G/DL — SIGNIFICANT CHANGE UP (ref 11.5–15.5)
MCHC RBC-ENTMCNC: 30.3 PG — SIGNIFICANT CHANGE UP (ref 27–34)
MCHC RBC-ENTMCNC: 32.4 GM/DL — SIGNIFICANT CHANGE UP (ref 32–36)
MCV RBC AUTO: 93.4 FL — SIGNIFICANT CHANGE UP (ref 80–100)
PLATELET # BLD AUTO: 227 K/UL — SIGNIFICANT CHANGE UP (ref 150–400)
POTASSIUM SERPL-MCNC: 4.3 MMOL/L — SIGNIFICANT CHANGE UP (ref 3.5–5.3)
POTASSIUM SERPL-SCNC: 4.3 MMOL/L — SIGNIFICANT CHANGE UP (ref 3.5–5.3)
RBC # BLD: 3.8 M/UL — SIGNIFICANT CHANGE UP (ref 3.8–5.2)
RBC # FLD: 14.1 % — SIGNIFICANT CHANGE UP (ref 10.3–14.5)
SODIUM SERPL-SCNC: 144 MMOL/L — SIGNIFICANT CHANGE UP (ref 135–145)
WBC # BLD: 6.27 K/UL — SIGNIFICANT CHANGE UP (ref 3.8–10.5)
WBC # FLD AUTO: 6.27 K/UL — SIGNIFICANT CHANGE UP (ref 3.8–10.5)

## 2017-03-05 RX ORDER — LOSARTAN POTASSIUM 100 MG/1
50 TABLET, FILM COATED ORAL DAILY
Qty: 0 | Refills: 0 | Status: DISCONTINUED | OUTPATIENT
Start: 2017-03-05 | End: 2017-03-08

## 2017-03-05 RX ADMIN — PANTOPRAZOLE SODIUM 40 MILLIGRAM(S): 20 TABLET, DELAYED RELEASE ORAL at 17:14

## 2017-03-05 RX ADMIN — OXYCODONE HYDROCHLORIDE 5 MILLIGRAM(S): 5 TABLET ORAL at 05:40

## 2017-03-05 RX ADMIN — OXYCODONE HYDROCHLORIDE 5 MILLIGRAM(S): 5 TABLET ORAL at 17:14

## 2017-03-05 RX ADMIN — Medication 250 MILLIGRAM(S): at 05:14

## 2017-03-05 RX ADMIN — OXYCODONE HYDROCHLORIDE 5 MILLIGRAM(S): 5 TABLET ORAL at 18:13

## 2017-03-05 RX ADMIN — HEPARIN SODIUM 5000 UNIT(S): 5000 INJECTION INTRAVENOUS; SUBCUTANEOUS at 17:14

## 2017-03-05 RX ADMIN — OXYCODONE HYDROCHLORIDE 5 MILLIGRAM(S): 5 TABLET ORAL at 05:10

## 2017-03-05 RX ADMIN — CLOPIDOGREL BISULFATE 75 MILLIGRAM(S): 75 TABLET, FILM COATED ORAL at 11:03

## 2017-03-05 RX ADMIN — Medication 250 MILLIGRAM(S): at 05:44

## 2017-03-05 RX ADMIN — Medication 100 MILLIGRAM(S): at 05:10

## 2017-03-05 RX ADMIN — Medication 100 MILLIGRAM(S): at 13:35

## 2017-03-05 RX ADMIN — Medication 100 MILLIGRAM(S): at 21:21

## 2017-03-05 RX ADMIN — NYSTATIN CREAM 1 APPLICATION(S): 100000 CREAM TOPICAL at 05:14

## 2017-03-05 RX ADMIN — LOSARTAN POTASSIUM 25 MILLIGRAM(S): 100 TABLET, FILM COATED ORAL at 05:07

## 2017-03-05 RX ADMIN — Medication 1 APPLICATION(S): at 05:10

## 2017-03-05 RX ADMIN — NYSTATIN CREAM 1 APPLICATION(S): 100000 CREAM TOPICAL at 21:22

## 2017-03-05 RX ADMIN — PANTOPRAZOLE SODIUM 40 MILLIGRAM(S): 20 TABLET, DELAYED RELEASE ORAL at 05:14

## 2017-03-05 NOTE — PROVIDER CONTACT NOTE (OTHER) - ACTION/TREATMENT ORDERED:
Hold bedtime sliding scale as per NP.
No bedtime sliding scale to be given as per NP.
RN will administer losartan. Will continue to monitor.
RN will continue to monitor, O.K. not to give bedtime sliding scale of insulin as per NP.

## 2017-03-05 NOTE — PROVIDER CONTACT NOTE (OTHER) - RECOMMENDATIONS
Hold bedtime sliding scale as per NP.
No bedtime sliding scale to be given as per NP.
Pt educated on the importance of taking blood pressure medications.
RN will continue to monitor, O.K. not to give bedtime sliding scale of insulin as per NP.

## 2017-03-06 RX ORDER — SENNA PLUS 8.6 MG/1
2 TABLET ORAL AT BEDTIME
Qty: 0 | Refills: 0 | Status: DISCONTINUED | OUTPATIENT
Start: 2017-03-06 | End: 2017-03-08

## 2017-03-06 RX ORDER — LACTULOSE 10 G/15ML
20 SOLUTION ORAL EVERY 4 HOURS
Qty: 0 | Refills: 0 | Status: DISCONTINUED | OUTPATIENT
Start: 2017-03-06 | End: 2017-03-06

## 2017-03-06 RX ORDER — LACTULOSE 10 G/15ML
30 SOLUTION ORAL EVERY 4 HOURS
Qty: 0 | Refills: 0 | Status: DISCONTINUED | OUTPATIENT
Start: 2017-03-06 | End: 2017-03-06

## 2017-03-06 RX ORDER — OXYCODONE HYDROCHLORIDE 5 MG/1
5 TABLET ORAL EVERY 8 HOURS
Qty: 0 | Refills: 0 | Status: DISCONTINUED | OUTPATIENT
Start: 2017-03-06 | End: 2017-03-08

## 2017-03-06 RX ADMIN — Medication 1 TABLET(S): at 05:41

## 2017-03-06 RX ADMIN — OXYCODONE HYDROCHLORIDE 5 MILLIGRAM(S): 5 TABLET ORAL at 17:50

## 2017-03-06 RX ADMIN — Medication 250 MILLIGRAM(S): at 05:45

## 2017-03-06 RX ADMIN — Medication 250 MILLIGRAM(S): at 05:43

## 2017-03-06 RX ADMIN — Medication 1 TABLET(S): at 17:17

## 2017-03-06 RX ADMIN — LOSARTAN POTASSIUM 50 MILLIGRAM(S): 100 TABLET, FILM COATED ORAL at 05:42

## 2017-03-06 RX ADMIN — OXYCODONE HYDROCHLORIDE 5 MILLIGRAM(S): 5 TABLET ORAL at 05:43

## 2017-03-06 RX ADMIN — Medication 100 MILLIGRAM(S): at 21:10

## 2017-03-06 RX ADMIN — Medication 1 APPLICATION(S): at 05:41

## 2017-03-06 RX ADMIN — PANTOPRAZOLE SODIUM 40 MILLIGRAM(S): 20 TABLET, DELAYED RELEASE ORAL at 05:44

## 2017-03-06 RX ADMIN — NYSTATIN CREAM 1 APPLICATION(S): 100000 CREAM TOPICAL at 13:27

## 2017-03-06 RX ADMIN — Medication 1 APPLICATION(S): at 17:17

## 2017-03-06 RX ADMIN — NYSTATIN CREAM 1 APPLICATION(S): 100000 CREAM TOPICAL at 05:45

## 2017-03-06 RX ADMIN — Medication 250 MILLIGRAM(S): at 17:50

## 2017-03-06 RX ADMIN — NYSTATIN CREAM 1 APPLICATION(S): 100000 CREAM TOPICAL at 21:10

## 2017-03-06 RX ADMIN — HEPARIN SODIUM 5000 UNIT(S): 5000 INJECTION INTRAVENOUS; SUBCUTANEOUS at 05:44

## 2017-03-06 RX ADMIN — PANTOPRAZOLE SODIUM 40 MILLIGRAM(S): 20 TABLET, DELAYED RELEASE ORAL at 17:17

## 2017-03-06 RX ADMIN — OXYCODONE HYDROCHLORIDE 5 MILLIGRAM(S): 5 TABLET ORAL at 05:50

## 2017-03-06 RX ADMIN — HEPARIN SODIUM 5000 UNIT(S): 5000 INJECTION INTRAVENOUS; SUBCUTANEOUS at 17:17

## 2017-03-06 RX ADMIN — Medication 250 MILLIGRAM(S): at 17:17

## 2017-03-06 RX ADMIN — OXYCODONE HYDROCHLORIDE 5 MILLIGRAM(S): 5 TABLET ORAL at 17:17

## 2017-03-06 RX ADMIN — CLOPIDOGREL BISULFATE 75 MILLIGRAM(S): 75 TABLET, FILM COATED ORAL at 11:47

## 2017-03-06 RX ADMIN — SENNA PLUS 2 TABLET(S): 8.6 TABLET ORAL at 21:10

## 2017-03-06 RX ADMIN — Medication 100 MILLIGRAM(S): at 13:27

## 2017-03-06 RX ADMIN — Medication 100 MILLIGRAM(S): at 05:41

## 2017-03-07 LAB
HCOV OC43 RNA SPEC QL NAA+PROBE: DETECTED
RAPID RVP RESULT: DETECTED

## 2017-03-07 RX ORDER — DOCUSATE SODIUM 100 MG
1 CAPSULE ORAL
Qty: 0 | Refills: 0 | COMMUNITY
Start: 2017-03-07

## 2017-03-07 RX ORDER — OXYCODONE HYDROCHLORIDE 5 MG/1
1 TABLET ORAL
Qty: 0 | Refills: 0 | COMMUNITY
Start: 2017-03-07

## 2017-03-07 RX ORDER — PETROLATUM,WHITE
1 JELLY (GRAM) TOPICAL
Qty: 0 | Refills: 0 | COMMUNITY
Start: 2017-03-07

## 2017-03-07 RX ORDER — LOSARTAN POTASSIUM 100 MG/1
1 TABLET, FILM COATED ORAL
Qty: 0 | Refills: 0 | COMMUNITY
Start: 2017-03-07

## 2017-03-07 RX ORDER — PANTOPRAZOLE SODIUM 20 MG/1
1 TABLET, DELAYED RELEASE ORAL
Qty: 0 | Refills: 0 | COMMUNITY
Start: 2017-03-07

## 2017-03-07 RX ORDER — ACETAMINOPHEN 500 MG
2 TABLET ORAL
Qty: 0 | Refills: 0 | COMMUNITY
Start: 2017-03-07

## 2017-03-07 RX ORDER — NYSTATIN CREAM 100000 [USP'U]/G
1 CREAM TOPICAL
Qty: 0 | Refills: 0 | COMMUNITY
Start: 2017-03-07

## 2017-03-07 RX ORDER — SENNA PLUS 8.6 MG/1
2 TABLET ORAL
Qty: 0 | Refills: 0 | COMMUNITY
Start: 2017-03-07

## 2017-03-07 RX ORDER — CLOPIDOGREL BISULFATE 75 MG/1
1 TABLET, FILM COATED ORAL
Qty: 0 | Refills: 0 | COMMUNITY
Start: 2017-03-07

## 2017-03-07 RX ORDER — CLOPIDOGREL BISULFATE 75 MG/1
1 TABLET, FILM COATED ORAL
Qty: 0 | Refills: 0 | COMMUNITY

## 2017-03-07 RX ORDER — SIMVASTATIN 20 MG/1
1 TABLET, FILM COATED ORAL
Qty: 0 | Refills: 0 | COMMUNITY
Start: 2017-03-07

## 2017-03-07 RX ADMIN — Medication 1 APPLICATION(S): at 17:34

## 2017-03-07 RX ADMIN — PANTOPRAZOLE SODIUM 40 MILLIGRAM(S): 20 TABLET, DELAYED RELEASE ORAL at 17:33

## 2017-03-07 RX ADMIN — Medication 250 MILLIGRAM(S): at 07:10

## 2017-03-07 RX ADMIN — Medication 100 MILLIGRAM(S): at 06:24

## 2017-03-07 RX ADMIN — OXYCODONE HYDROCHLORIDE 5 MILLIGRAM(S): 5 TABLET ORAL at 07:10

## 2017-03-07 RX ADMIN — Medication 1 TABLET(S): at 06:23

## 2017-03-07 RX ADMIN — Medication 250 MILLIGRAM(S): at 17:34

## 2017-03-07 RX ADMIN — Medication 100 MILLIGRAM(S): at 15:21

## 2017-03-07 RX ADMIN — OXYCODONE HYDROCHLORIDE 5 MILLIGRAM(S): 5 TABLET ORAL at 06:23

## 2017-03-07 RX ADMIN — Medication 1 TABLET(S): at 17:34

## 2017-03-07 RX ADMIN — CLOPIDOGREL BISULFATE 75 MILLIGRAM(S): 75 TABLET, FILM COATED ORAL at 11:11

## 2017-03-07 RX ADMIN — NYSTATIN CREAM 1 APPLICATION(S): 100000 CREAM TOPICAL at 15:21

## 2017-03-07 RX ADMIN — Medication 250 MILLIGRAM(S): at 18:05

## 2017-03-07 RX ADMIN — Medication 250 MILLIGRAM(S): at 06:22

## 2017-03-08 VITALS
WEIGHT: 90.17 LBS | TEMPERATURE: 99 F | SYSTOLIC BLOOD PRESSURE: 97 MMHG | HEART RATE: 89 BPM | OXYGEN SATURATION: 95 % | DIASTOLIC BLOOD PRESSURE: 58 MMHG | RESPIRATION RATE: 17 BRPM

## 2017-03-08 PROCEDURE — 87581 M.PNEUMON DNA AMP PROBE: CPT

## 2017-03-08 PROCEDURE — 80053 COMPREHEN METABOLIC PANEL: CPT

## 2017-03-08 PROCEDURE — 71045 X-RAY EXAM CHEST 1 VIEW: CPT

## 2017-03-08 PROCEDURE — 73560 X-RAY EXAM OF KNEE 1 OR 2: CPT

## 2017-03-08 PROCEDURE — 97161 PT EVAL LOW COMPLEX 20 MIN: CPT

## 2017-03-08 PROCEDURE — 97110 THERAPEUTIC EXERCISES: CPT

## 2017-03-08 PROCEDURE — 87798 DETECT AGENT NOS DNA AMP: CPT

## 2017-03-08 PROCEDURE — 70450 CT HEAD/BRAIN W/O DYE: CPT

## 2017-03-08 PROCEDURE — 97116 GAIT TRAINING THERAPY: CPT

## 2017-03-08 PROCEDURE — 93005 ELECTROCARDIOGRAM TRACING: CPT

## 2017-03-08 PROCEDURE — 83036 HEMOGLOBIN GLYCOSYLATED A1C: CPT

## 2017-03-08 PROCEDURE — 80061 LIPID PANEL: CPT

## 2017-03-08 PROCEDURE — 93971 EXTREMITY STUDY: CPT

## 2017-03-08 PROCEDURE — 73030 X-RAY EXAM OF SHOULDER: CPT

## 2017-03-08 PROCEDURE — 73502 X-RAY EXAM HIP UNI 2-3 VIEWS: CPT

## 2017-03-08 PROCEDURE — 85027 COMPLETE CBC AUTOMATED: CPT

## 2017-03-08 PROCEDURE — 87486 CHLMYD PNEUM DNA AMP PROBE: CPT

## 2017-03-08 PROCEDURE — 80048 BASIC METABOLIC PNL TOTAL CA: CPT

## 2017-03-08 PROCEDURE — 99285 EMERGENCY DEPT VISIT HI MDM: CPT | Mod: 25

## 2017-03-08 PROCEDURE — 87633 RESP VIRUS 12-25 TARGETS: CPT

## 2017-03-08 RX ADMIN — CLOPIDOGREL BISULFATE 75 MILLIGRAM(S): 75 TABLET, FILM COATED ORAL at 11:13

## 2017-03-08 RX ADMIN — PANTOPRAZOLE SODIUM 40 MILLIGRAM(S): 20 TABLET, DELAYED RELEASE ORAL at 06:32

## 2017-03-08 RX ADMIN — Medication 1 APPLICATION(S): at 17:06

## 2017-03-08 RX ADMIN — Medication 1 TABLET(S): at 06:32

## 2017-03-08 RX ADMIN — Medication 1 APPLICATION(S): at 06:32

## 2017-03-08 RX ADMIN — Medication 650 MILLIGRAM(S): at 11:13

## 2017-03-08 RX ADMIN — Medication 250 MILLIGRAM(S): at 06:32

## 2017-03-08 RX ADMIN — Medication 250 MILLIGRAM(S): at 17:06

## 2017-03-08 RX ADMIN — Medication 200 MILLIGRAM(S): at 11:13

## 2017-03-08 RX ADMIN — LOSARTAN POTASSIUM 50 MILLIGRAM(S): 100 TABLET, FILM COATED ORAL at 06:32

## 2017-03-08 RX ADMIN — Medication 100 MILLIGRAM(S): at 11:15

## 2017-07-30 ENCOUNTER — INPATIENT (INPATIENT)
Facility: HOSPITAL | Age: 82
LOS: 3 days | Discharge: NURSING, REHAB OR LONG-TERM CARE | DRG: 880 | End: 2017-08-03
Attending: INTERNAL MEDICINE | Admitting: INTERNAL MEDICINE
Payer: MEDICARE

## 2017-07-30 VITALS — SYSTOLIC BLOOD PRESSURE: 148 MMHG | RESPIRATION RATE: 18 BRPM | DIASTOLIC BLOOD PRESSURE: 82 MMHG | HEART RATE: 86 BPM

## 2017-07-30 DIAGNOSIS — I10 ESSENTIAL (PRIMARY) HYPERTENSION: ICD-10-CM

## 2017-07-30 DIAGNOSIS — Z98.890 OTHER SPECIFIED POSTPROCEDURAL STATES: Chronic | ICD-10-CM

## 2017-07-30 DIAGNOSIS — R73.03 PREDIABETES: ICD-10-CM

## 2017-07-30 DIAGNOSIS — R06.02 SHORTNESS OF BREATH: ICD-10-CM

## 2017-07-30 DIAGNOSIS — I63.9 CEREBRAL INFARCTION, UNSPECIFIED: ICD-10-CM

## 2017-07-30 DIAGNOSIS — N20.0 CALCULUS OF KIDNEY: Chronic | ICD-10-CM

## 2017-07-30 LAB
ALBUMIN SERPL ELPH-MCNC: 3.9 G/DL — SIGNIFICANT CHANGE UP (ref 3.3–5)
ALP SERPL-CCNC: 56 U/L — SIGNIFICANT CHANGE UP (ref 40–120)
ALT FLD-CCNC: 11 U/L RC — SIGNIFICANT CHANGE UP (ref 10–45)
ANION GAP SERPL CALC-SCNC: 13 MMOL/L — SIGNIFICANT CHANGE UP (ref 5–17)
APTT BLD: 32.7 SEC — SIGNIFICANT CHANGE UP (ref 27.5–37.4)
AST SERPL-CCNC: 16 U/L — SIGNIFICANT CHANGE UP (ref 10–40)
BASOPHILS # BLD AUTO: 0 K/UL — SIGNIFICANT CHANGE UP (ref 0–0.2)
BASOPHILS NFR BLD AUTO: 0.6 % — SIGNIFICANT CHANGE UP (ref 0–2)
BILIRUB SERPL-MCNC: 0.2 MG/DL — SIGNIFICANT CHANGE UP (ref 0.2–1.2)
BUN SERPL-MCNC: 18 MG/DL — SIGNIFICANT CHANGE UP (ref 7–23)
CALCIUM SERPL-MCNC: 9.6 MG/DL — SIGNIFICANT CHANGE UP (ref 8.4–10.5)
CHLORIDE SERPL-SCNC: 104 MMOL/L — SIGNIFICANT CHANGE UP (ref 96–108)
CO2 SERPL-SCNC: 26 MMOL/L — SIGNIFICANT CHANGE UP (ref 22–31)
CREAT SERPL-MCNC: 0.7 MG/DL — SIGNIFICANT CHANGE UP (ref 0.5–1.3)
EOSINOPHIL # BLD AUTO: 0.1 K/UL — SIGNIFICANT CHANGE UP (ref 0–0.5)
EOSINOPHIL NFR BLD AUTO: 0.9 % — SIGNIFICANT CHANGE UP (ref 0–6)
GLUCOSE SERPL-MCNC: 113 MG/DL — HIGH (ref 70–99)
HCT VFR BLD CALC: 40.4 % — SIGNIFICANT CHANGE UP (ref 34.5–45)
HGB BLD-MCNC: 13.2 G/DL — SIGNIFICANT CHANGE UP (ref 11.5–15.5)
INR BLD: 0.97 RATIO — SIGNIFICANT CHANGE UP (ref 0.88–1.16)
LYMPHOCYTES # BLD AUTO: 1.4 K/UL — SIGNIFICANT CHANGE UP (ref 1–3.3)
LYMPHOCYTES # BLD AUTO: 16.5 % — SIGNIFICANT CHANGE UP (ref 13–44)
MCHC RBC-ENTMCNC: 31 PG — SIGNIFICANT CHANGE UP (ref 27–34)
MCHC RBC-ENTMCNC: 32.5 GM/DL — SIGNIFICANT CHANGE UP (ref 32–36)
MCV RBC AUTO: 95.3 FL — SIGNIFICANT CHANGE UP (ref 80–100)
MONOCYTES # BLD AUTO: 0.6 K/UL — SIGNIFICANT CHANGE UP (ref 0–0.9)
MONOCYTES NFR BLD AUTO: 6.7 % — SIGNIFICANT CHANGE UP (ref 2–14)
NEUTROPHILS # BLD AUTO: 6.3 K/UL — SIGNIFICANT CHANGE UP (ref 1.8–7.4)
NEUTROPHILS NFR BLD AUTO: 75.2 % — SIGNIFICANT CHANGE UP (ref 43–77)
PLATELET # BLD AUTO: 257 K/UL — SIGNIFICANT CHANGE UP (ref 150–400)
POTASSIUM SERPL-MCNC: 4.5 MMOL/L — SIGNIFICANT CHANGE UP (ref 3.5–5.3)
POTASSIUM SERPL-SCNC: 4.5 MMOL/L — SIGNIFICANT CHANGE UP (ref 3.5–5.3)
PROT SERPL-MCNC: 7 G/DL — SIGNIFICANT CHANGE UP (ref 6–8.3)
PROTHROM AB SERPL-ACNC: 10.6 SEC — SIGNIFICANT CHANGE UP (ref 9.8–12.7)
RBC # BLD: 4.24 M/UL — SIGNIFICANT CHANGE UP (ref 3.8–5.2)
RBC # FLD: 12.9 % — SIGNIFICANT CHANGE UP (ref 10.3–14.5)
SODIUM SERPL-SCNC: 143 MMOL/L — SIGNIFICANT CHANGE UP (ref 135–145)
WBC # BLD: 8.4 K/UL — SIGNIFICANT CHANGE UP (ref 3.8–10.5)
WBC # FLD AUTO: 8.4 K/UL — SIGNIFICANT CHANGE UP (ref 3.8–10.5)

## 2017-07-30 PROCEDURE — 99285 EMERGENCY DEPT VISIT HI MDM: CPT

## 2017-07-30 PROCEDURE — 71010: CPT | Mod: 26

## 2017-07-30 RX ORDER — DEXTROSE 50 % IN WATER 50 %
25 SYRINGE (ML) INTRAVENOUS ONCE
Qty: 0 | Refills: 0 | Status: DISCONTINUED | OUTPATIENT
Start: 2017-07-30 | End: 2017-08-03

## 2017-07-30 RX ORDER — HEPARIN SODIUM 5000 [USP'U]/ML
5000 INJECTION INTRAVENOUS; SUBCUTANEOUS EVERY 8 HOURS
Qty: 0 | Refills: 0 | Status: DISCONTINUED | OUTPATIENT
Start: 2017-07-30 | End: 2017-08-03

## 2017-07-30 RX ORDER — CLOPIDOGREL BISULFATE 75 MG/1
75 TABLET, FILM COATED ORAL DAILY
Qty: 0 | Refills: 0 | Status: DISCONTINUED | OUTPATIENT
Start: 2017-07-30 | End: 2017-08-03

## 2017-07-30 RX ORDER — TRAZODONE HCL 50 MG
50 TABLET ORAL ONCE
Qty: 0 | Refills: 0 | Status: COMPLETED | OUTPATIENT
Start: 2017-07-30 | End: 2017-07-31

## 2017-07-30 RX ORDER — SIMVASTATIN 20 MG/1
20 TABLET, FILM COATED ORAL AT BEDTIME
Qty: 0 | Refills: 0 | Status: DISCONTINUED | OUTPATIENT
Start: 2017-07-30 | End: 2017-08-03

## 2017-07-30 RX ORDER — ACETAMINOPHEN 500 MG
650 TABLET ORAL ONCE
Qty: 0 | Refills: 0 | Status: COMPLETED | OUTPATIENT
Start: 2017-07-30 | End: 2017-07-30

## 2017-07-30 RX ORDER — SODIUM CHLORIDE 9 MG/ML
1000 INJECTION, SOLUTION INTRAVENOUS
Qty: 0 | Refills: 0 | Status: DISCONTINUED | OUTPATIENT
Start: 2017-07-30 | End: 2017-08-03

## 2017-07-30 RX ORDER — INSULIN LISPRO 100/ML
VIAL (ML) SUBCUTANEOUS
Qty: 0 | Refills: 0 | Status: DISCONTINUED | OUTPATIENT
Start: 2017-07-30 | End: 2017-08-03

## 2017-07-30 RX ORDER — LOSARTAN POTASSIUM 100 MG/1
50 TABLET, FILM COATED ORAL DAILY
Qty: 0 | Refills: 0 | Status: DISCONTINUED | OUTPATIENT
Start: 2017-07-30 | End: 2017-08-02

## 2017-07-30 RX ORDER — PETROLATUM,WHITE
1 JELLY (GRAM) TOPICAL
Qty: 0 | Refills: 0 | Status: DISCONTINUED | OUTPATIENT
Start: 2017-07-30 | End: 2017-08-03

## 2017-07-30 RX ORDER — GLUCAGON INJECTION, SOLUTION 0.5 MG/.1ML
1 INJECTION, SOLUTION SUBCUTANEOUS ONCE
Qty: 0 | Refills: 0 | Status: DISCONTINUED | OUTPATIENT
Start: 2017-07-30 | End: 2017-08-03

## 2017-07-30 RX ORDER — OXYCODONE HYDROCHLORIDE 5 MG/1
5 TABLET ORAL EVERY 8 HOURS
Qty: 0 | Refills: 0 | Status: DISCONTINUED | OUTPATIENT
Start: 2017-07-30 | End: 2017-08-03

## 2017-07-30 RX ORDER — DEXTROSE 50 % IN WATER 50 %
12.5 SYRINGE (ML) INTRAVENOUS ONCE
Qty: 0 | Refills: 0 | Status: DISCONTINUED | OUTPATIENT
Start: 2017-07-30 | End: 2017-08-03

## 2017-07-30 RX ORDER — DEXTROSE 50 % IN WATER 50 %
1 SYRINGE (ML) INTRAVENOUS ONCE
Qty: 0 | Refills: 0 | Status: DISCONTINUED | OUTPATIENT
Start: 2017-07-30 | End: 2017-08-03

## 2017-07-30 RX ORDER — INSULIN LISPRO 100/ML
VIAL (ML) SUBCUTANEOUS AT BEDTIME
Qty: 0 | Refills: 0 | Status: DISCONTINUED | OUTPATIENT
Start: 2017-07-30 | End: 2017-08-03

## 2017-07-30 RX ADMIN — HEPARIN SODIUM 5000 UNIT(S): 5000 INJECTION INTRAVENOUS; SUBCUTANEOUS at 23:46

## 2017-07-30 RX ADMIN — Medication 650 MILLIGRAM(S): at 18:48

## 2017-07-30 RX ADMIN — Medication 650 MILLIGRAM(S): at 18:18

## 2017-07-30 NOTE — ED PROVIDER NOTE - MEDICAL DECISION MAKING DETAILS
87F c/o intermittent SOB; on exam well apearring in NAD; lungs CTABL; SOB appears to occur more with exertion; ECG NSR, labs unremarkable, plan for admission for further evaluation

## 2017-07-30 NOTE — PROVIDER CONTACT NOTE (OTHER) - BACKGROUND
Received pt from ED at shift change. Pt scheduled for blood glucose testing before meals and at HS. Pt refusing finger sticks.

## 2017-07-30 NOTE — ED PROVIDER NOTE - ATTENDING CONTRIBUTION TO CARE
I have examined and evaluated this patient with the above NP, and agree with the documented clinical history, exam and plan.   Briefly: 87F c/o intermittent SOB; on exam well apearring in NAD; lungs CTABL; SOB appears to occur more with exertion; ECG NSR, labs unremarkable, plan for admission for further evaluation

## 2017-07-30 NOTE — ED ADULT NURSE NOTE - OBJECTIVE STATEMENT
86 y/o female BIBA for nausea per EMS. Upon presentation pt denies nausea and only complains of being shaky since this morning. Pt's  was also brought in by ems. Denies chest pain, sob, n/v/d, fever or chills. A&Ox4, vss, skin warm dry and intact, maex4, lungs cta, abd soft nondistended. Will continue to reassess pt. 86 y/o female BIBA for nausea per EMS. Upon presentation pt denies nausea and complains of being shaky since this morning and sob for the past 3 weeks. Pt's  was also brought in by ems. Denies chest pain, n/v/d, fever or chills. A&Ox4, vss, skin warm dry and intact, maex4, lungs cta, abd soft nondistended. Will continue to reassess pt.

## 2017-07-30 NOTE — ED PROVIDER NOTE - NEURO NEGATIVE STATEMENT, MLM
no changes. o right sided weakness, no loss of consciousness, no gait abnormality, no headache, no sensory deficits, and no weakness.

## 2017-07-30 NOTE — H&P ADULT - NSHPREVIEWOFSYSTEMS_GEN_ALL_CORE
Gen: no loss of wt + loss of appetite "I am not feeling well"  ENT: no dizziness or hearing loss  Ophth: no blurring of vision or loss of vision  Resp: No cough or sputum production  CVS: No CP or palpitations or orthopnea  positive shortness of breath   GI: no N/V/D  : no dysuria, hematuria  Endo: no polyuria or excessive sweating  Neuro: no weakness or paresthesias  Psych: No suicidal or depressive ideation  Heme: No petechiae or easy bruising  Msk: No joint pain or swelling  right leg chronic pain and swelling   All other ROS negative

## 2017-07-30 NOTE — H&P ADULT - NSHPPHYSICALEXAM_GEN_ALL_CORE
in NAD, lying flat, disheveled  HEENT: HANSA EOMI  Neck: Supple, no JVD, no thyromegaly  Lungs: clear, no rhonchi, no wheeze, no crackles  CVS: S1 S2 no M/R/G  Abdomen: obese, no tenderness, no organomegaly, BS present  Neuro: AO x 3, no focal weakness, non focal  Psych: appropriate affect  Skin: warm, dry  Ext: no edema, no clubbing left leg circumference > right leg  Msk: no joint swelling or deformities  Back: no CVA tenderness, no kyphosis/scoliosis

## 2017-07-30 NOTE — H&P ADULT - ASSESSMENT
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 PE not c/w CHF

## 2017-07-30 NOTE — ED PROVIDER NOTE - OBJECTIVE STATEMENT
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 Pt stated she usually ambulating with a walker and had intermittent SOB for 1 week. Denies fever, chills or recent cold symptoms. Denies CP/SOB/ABD pain. Denies N/V/D. Denies recent fall or injury. Denies urinary problems.

## 2017-07-30 NOTE — H&P ADULT - HISTORY OF PRESENT ILLNESS
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 Pt stated she usually ambulating with a walker and had intermittent SOB for 1 week. Denies fever, chills or recent cold symptoms. Denies CP/SOB/ABD pain. Denies N/V/D. Denies recent fall or injury. Denies urinary problems. positive right leg pain, chronic since CVA  poor historian does not remember meds

## 2017-07-30 NOTE — ED PROVIDER NOTE - PHYSICAL EXAMINATION
NAD, VSS, Afebrile, + Dry skin/ mouth, No spinal tender, No rib or CVA tender, + chronic right sided weakness (4/5, no changes as per pt). B/L 1st phalanx eryth around nail without fluctuating swelling.

## 2017-07-30 NOTE — H&P ADULT - PROBLEM SELECTOR PLAN 1
unclear etiology  last echo 2015 normal LV fxn   will repeat  doubt pulm etiology  pulm eval in AM Dr Reynolds

## 2017-07-31 PROCEDURE — 93306 TTE W/DOPPLER COMPLETE: CPT | Mod: 26

## 2017-07-31 RX ORDER — DOCUSATE SODIUM 100 MG
100 CAPSULE ORAL DAILY
Qty: 0 | Refills: 0 | Status: DISCONTINUED | OUTPATIENT
Start: 2017-07-31 | End: 2017-08-03

## 2017-07-31 RX ORDER — ACETAMINOPHEN 500 MG
650 TABLET ORAL ONCE
Qty: 0 | Refills: 0 | Status: COMPLETED | OUTPATIENT
Start: 2017-07-31 | End: 2017-07-31

## 2017-07-31 RX ORDER — SENNA PLUS 8.6 MG/1
2 TABLET ORAL AT BEDTIME
Qty: 0 | Refills: 0 | Status: DISCONTINUED | OUTPATIENT
Start: 2017-07-31 | End: 2017-08-03

## 2017-07-31 RX ADMIN — Medication 1 APPLICATION(S): at 17:37

## 2017-07-31 RX ADMIN — Medication 50 MILLIGRAM(S): at 00:31

## 2017-07-31 RX ADMIN — Medication 650 MILLIGRAM(S): at 12:10

## 2017-07-31 RX ADMIN — Medication 650 MILLIGRAM(S): at 11:30

## 2017-07-31 RX ADMIN — Medication 1 APPLICATION(S): at 06:43

## 2017-07-31 RX ADMIN — HEPARIN SODIUM 5000 UNIT(S): 5000 INJECTION INTRAVENOUS; SUBCUTANEOUS at 14:52

## 2017-07-31 RX ADMIN — SIMVASTATIN 20 MILLIGRAM(S): 20 TABLET, FILM COATED ORAL at 22:45

## 2017-07-31 RX ADMIN — CLOPIDOGREL BISULFATE 75 MILLIGRAM(S): 75 TABLET, FILM COATED ORAL at 11:29

## 2017-07-31 RX ADMIN — Medication 100 MILLIGRAM(S): at 22:46

## 2017-07-31 NOTE — PHYSICAL THERAPY INITIAL EVALUATION ADULT - DIAGNOSIS, PT EVAL
decreased strength R side greater than left 2/2 to h/o CVA, decreased DF and elbow ext ROM / decreased upright motor control / balance and amb

## 2017-07-31 NOTE — PHYSICAL THERAPY INITIAL EVALUATION ADULT - PERTINENT HX OF CURRENT PROBLEM, REHAB EVAL
88yo female admitted to [facility] on 7/30/17 pt with PMHx of CVA 7/2015 with residual R sided weakness and pain, type 2 diabetes mellitus, HTN, was brought to ED by EMS c/o intermittent SOB for 1 week. PE not c/w CHF

## 2017-07-31 NOTE — PHYSICAL THERAPY INITIAL EVALUATION ADULT - TRANSFER SAFETY CONCERNS NOTED: SIT/STAND, REHAB EVAL
decreased step length/decreased balance during turns/decreased weight-shifting ability/decreased safety awareness

## 2017-07-31 NOTE — PHYSICAL THERAPY INITIAL EVALUATION ADULT - CRITERIA FOR SKILLED THERAPEUTIC INTERVENTIONS
impairments found/anticipated discharge recommendation/subacute rehab and then some kind of placement may need guardianship only son committed  suicide

## 2017-07-31 NOTE — PHYSICAL THERAPY INITIAL EVALUATION ADULT - ADDITIONAL COMMENTS
lives in apt w/ elevator and private hire aides 9-5 7 days week but have not been functioning well in this situation, glasses for reading / hearing good, R lenny

## 2017-07-31 NOTE — PHYSICAL THERAPY INITIAL EVALUATION ADULT - ACTIVE RANGE OF MOTION EXAMINATION, REHAB EVAL
bilateral  lower extremity Active ROM was WFL (within functional limits)/R DF -10 /15  R elbow -5 extension/bilateral upper extremity Active ROM was WFL (within functional limits)

## 2017-08-01 DIAGNOSIS — F41.9 ANXIETY DISORDER, UNSPECIFIED: ICD-10-CM

## 2017-08-01 PROCEDURE — 99222 1ST HOSP IP/OBS MODERATE 55: CPT

## 2017-08-01 RX ADMIN — OXYCODONE HYDROCHLORIDE 5 MILLIGRAM(S): 5 TABLET ORAL at 12:30

## 2017-08-01 RX ADMIN — Medication 100 MILLIGRAM(S): at 11:54

## 2017-08-01 RX ADMIN — SIMVASTATIN 20 MILLIGRAM(S): 20 TABLET, FILM COATED ORAL at 21:37

## 2017-08-01 RX ADMIN — HEPARIN SODIUM 5000 UNIT(S): 5000 INJECTION INTRAVENOUS; SUBCUTANEOUS at 13:51

## 2017-08-01 RX ADMIN — Medication 1 APPLICATION(S): at 05:17

## 2017-08-01 RX ADMIN — Medication 1 APPLICATION(S): at 17:20

## 2017-08-01 RX ADMIN — HEPARIN SODIUM 5000 UNIT(S): 5000 INJECTION INTRAVENOUS; SUBCUTANEOUS at 05:16

## 2017-08-01 RX ADMIN — LOSARTAN POTASSIUM 50 MILLIGRAM(S): 100 TABLET, FILM COATED ORAL at 05:17

## 2017-08-01 RX ADMIN — CLOPIDOGREL BISULFATE 75 MILLIGRAM(S): 75 TABLET, FILM COATED ORAL at 11:54

## 2017-08-01 RX ADMIN — OXYCODONE HYDROCHLORIDE 5 MILLIGRAM(S): 5 TABLET ORAL at 11:57

## 2017-08-01 RX ADMIN — SENNA PLUS 2 TABLET(S): 8.6 TABLET ORAL at 21:37

## 2017-08-01 RX ADMIN — HEPARIN SODIUM 5000 UNIT(S): 5000 INJECTION INTRAVENOUS; SUBCUTANEOUS at 21:37

## 2017-08-01 NOTE — BEHAVIORAL HEALTH ASSESSMENT NOTE - NSBHCHARTREVIEWLAB_PSY_A_CORE FT
13.2   8.4   )-----------( 257      ( 30 Jul 2017 13:57 )             40.4     07-30    143  |  104  |  18  ----------------------------<  113<H>  4.5   |  26  |  0.70    Ca    9.6      30 Jul 2017 13:57    TPro  7.0  /  Alb  3.9  /  TBili  0.2  /  DBili  x   /  AST  16  /  ALT  11  /  AlkPhos  56  07-30

## 2017-08-01 NOTE — BEHAVIORAL HEALTH ASSESSMENT NOTE - SUMMARY
Pt is a 86 y/o  female, no formal psych hx, anxiety? no prior psych admissions, w/ PMHx of CVA (7/2015 with residual R sided weakness and pain), T2DM & HTN admitted for intermittent SOB seen for capacity for d/c planning, med team state pt needs to go to rehab, unsafe for pt go back home at this time, since  is not well medically, will need additional support at home, Pt understands risks/benefits of rehab. pt is agreeable to go to rehab at this time. pt has capacity for d/c planning to go to rehab.

## 2017-08-01 NOTE — BEHAVIORAL HEALTH ASSESSMENT NOTE - OTHER PAST PSYCHIATRIC HISTORY (INCLUDE DETAILS REGARDING ONSET, COURSE OF ILLNESS, INPATIENT/OUTPATIENT TREATMENT)
pt saw a therapist many years ago when she was having marital issues, pt thinks she took zoloft? for some time due to anxiety

## 2017-08-01 NOTE — CONSULT NOTE ADULT - PROBLEM SELECTOR RECOMMENDATION 9
The etiology of SOB seems to be anxiety: She has no pulmonary issues from before but gets SOB with anxiety: Her clinical probability for PE is pretty low: The chest radiograph is clear: and now she is feeling fine without SOB. Her last echo was with normal LV functions too. Yesterdays echo only showed mild diastolic function.

## 2017-08-01 NOTE — BEHAVIORAL HEALTH ASSESSMENT NOTE - HPI (INCLUDE ILLNESS QUALITY, SEVERITY, DURATION, TIMING, CONTEXT, MODIFYING FACTORS, ASSOCIATED SIGNS AND SYMPTOMS)
Pt is a 88 y/o  female, no formal psych hx, anxiety? no prior psych admissions, w/ PMHx of CVA (7/2015 with residual R sided weakness and pain), T2DM & HTN admitted for intermittent SOB for 1 week. seen for capacity for d/c planning, rehab placement. Pt states she is agreeable to go to rehab, pt understands the need for her to go to rehab at this time since her aide will be going on vacation. Pt was able to discuss some of the risks/benefits of rehab. Pt feels depressed sometimes due to her medical condition, no anhedonia, fair sleep, and appetite, no si/hi. Pt denies psychosis, jonh, no si/hi.

## 2017-08-01 NOTE — CONSULT NOTE ADULT - SUBJECTIVE AND OBJECTIVE BOX
I gali seen and examined the patient and history not4ed: I add    pt says she has no pulmonary issues and she always gets SOB when she becomes anxious. Her  was sick and that gave her anxiety which lead to SOB. Currently she is fine/: and denies any SOB.       Patient is a 87y old  Female who presents with a chief complaint of shortness of breath and 'not feeling well' (30 Jul 2017 19:30)      HPI:  86yo female pt with PMHx of CVA 7/2015 with residual R sided weakness and pain, type 2 diabetes mellitus, HTN, was brought to ED by EMS c/o intermittent SOB for 1 week. Pt stated she usually ambulating with a walker and had intermittent SOB for 1 week. Denies fever, chills or recent cold symptoms. Denies CP/SOB/ABD pain. Denies N/V/D. Denies recent fall or injury. Denies urinary problems. positive right leg pain, chronic since CVA  poor historian does not remember meds (30 Jul 2017 19:30)      ?FOLLOWING PRESENT  [x] Hx of PE/DVT, [x ] Hx COPD, [x Hx of Asthma, [x Hx of Hospitalization, [x]  Hx of BiPAP/CPAP use, [ x] Hx of CHARU    Allergies    aspirin (Unknown)  codeine (Nausea)  morphine (Vomiting)    Intolerances        PAST MEDICAL & SURGICAL HISTORY:  Stroke  Hypertension  Borderline diabetes  H/O hemorrhoidectomy  Nephrolithiasis  Cataract, bilateral      FAMILY HISTORY:  No pertinent family history in first degree relatives      Social History: [ x ] TOBACCO                  [  x] ETOH                                 [x  ] IVDA/DRUGS    REVIEW OF SYSTEMS      General:x	    Skin/Breast:x  	  Ophthalmologic:  	  ENMT:	x    Respiratory and Thorax: sob  	  Cardiovascular:	x    Gastrointestinal:	x    Genitourinary:	x    Musculoskeletal:	x    Neurological:	x    Psychiatric:	x    Hematology/Lymphatics:x	    Endocrine:	x    Allergic/Immunologic:	x    MEDICATIONS  (STANDING):  insulin lispro (HumaLOG) corrective regimen sliding scale   SubCutaneous three times a day before meals  insulin lispro (HumaLOG) corrective regimen sliding scale   SubCutaneous at bedtime  dextrose 5%. 1000 milliLiter(s) (50 mL/Hr) IV Continuous <Continuous>  dextrose 50% Injectable 12.5 Gram(s) IV Push once  dextrose 50% Injectable 25 Gram(s) IV Push once  dextrose 50% Injectable 25 Gram(s) IV Push once  losartan 50 milliGRAM(s) Oral daily  simvastatin 20 milliGRAM(s) Oral at bedtime  clopidogrel Tablet 75 milliGRAM(s) Oral daily  AQUAPHOR (petrolatum Ointment) 1 Application(s) Topical two times a day  heparin  Injectable 5000 Unit(s) SubCutaneous every 8 hours  docusate sodium 100 milliGRAM(s) Oral daily  senna 2 Tablet(s) Oral at bedtime    MEDICATIONS  (PRN):  dextrose Gel 1 Dose(s) Oral once PRN Blood Glucose LESS THAN 70 milliGRAM(s)/deciliter  glucagon  Injectable 1 milliGRAM(s) IntraMuscular once PRN Glucose LESS THAN 70 milligrams/deciliter  oxyCODONE    IR 5 milliGRAM(s) Oral every 8 hours PRN Moderate Pain (4 - 6)       Vital Signs Last 24 Hrs  T(C): 36.6 (01 Aug 2017 04:20), Max: 36.7 (31 Jul 2017 21:54)  T(F): 97.8 (01 Aug 2017 04:20), Max: 98.1 (31 Jul 2017 21:54)  HR: 69 (01 Aug 2017 04:20) (69 - 81)  BP: 158/78 (01 Aug 2017 04:20) (117/62 - 158/78)  BP(mean): --  RR: 18 (01 Aug 2017 04:20) (17 - 18)  SpO2: 96% (01 Aug 2017 04:20) (95% - 97%)        I&O's Summary    31 Jul 2017 07:01  -  01 Aug 2017 07:00  --------------------------------------------------------  IN: 640 mL / OUT: 0 mL / NET: 640 mL    01 Aug 2017 07:01  -  01 Aug 2017 10:03  --------------------------------------------------------  IN: 120 mL / OUT: 0 mL / NET: 120 mL        Physical Exam:   GENERAL: NAD, well-groomed, well-developed  HEENT: HANSA/   Atraumatic, Normocephalic  ENMT: No tonsillar erythema, exudates, or enlargement; Moist mucous membranes, Good dentition, No lesions  NECK: Supple, No JVD, Normal thyroid  CHEST/LUNG: Mostly clear !!  CVS: Regular rate and rhythm; No murmurs, rubs, or gallops  GI: : Soft, Nontender, Nondistended; Diya  EXTREMITIES:  2+ Peripheral Pulses, No clubbing, cyanosis, or edema  LYMPH: No lymphadenopathy noted  SKIN: No rashes or lesions  ENDOCRINOLOGY: No Thyromegaly  PSYCH: Appropriate    Labs:    CARDIAC MARKERS ( 30 Jul 2017 13:57 )  x     / <0.01 ng/mL / 51 U/L / x     / 2.8 ng/mL                            13.2   8.4   )-----------( 257      ( 30 Jul 2017 13:57 )             40.4     07-30    143  |  104  |  18  ----------------------------<  113<H>  4.5   |  26  |  0.70    Ca    9.6      30 Jul 2017 13:57    TPro  7.0  /  Alb  3.9  /  TBili  0.2  /  DBili  x   /  AST  16  /  ALT  11  /  AlkPhos  56  07-30    CAPILLARY BLOOD GLUCOSE        LIVER FUNCTIONS - ( 30 Jul 2017 13:57 )  Alb: 3.9 g/dL / Pro: 7.0 g/dL / ALK PHOS: 56 U/L / ALT: 11 U/L RC / AST: 16 U/L / GGT: x           PT/INR - ( 30 Jul 2017 14:40 )   PT: 10.6 sec;   INR: 0.97 ratio         PTT - ( 30 Jul 2017 14:40 )  PTT:32.7 sec    D DImer  Serum Pro-Brain Natriuretic Peptide: 603 pg/mL (07-30 @ 13:57)    Cultures:             Studies  Chest X-RAY  CT SCAN Chest < from: Xray Chest 1 View AP/PA (07.30.17 @ 14:49) >  EXAM:  CHEST SINGLE AP OR PA                            PROCEDURE DATE:  07/30/2017            INTERPRETATION:  EXAM DATE: 7/30/2017 2:49 PM    CLINICAL INFORMATION: Admission x-ray.    COMPARISON:  Chest x-ray dated February 27, 2017.    TECHNIQUE:   AP Portable chest x-ray.    INTERPRETATION:   Heart is not enlarged.  There is no evidence of focal consolidation.  There is no pleural effusions.  Trachea is midline.  No evidence of pneumothorax.    The visualized osseous structures no acute pathology.      IMPRESSION:  Clear lungs.                MOE PEREIRA M.D., RADIOLOGY RESIDENT  This document has been electronically signed.  TIAGO VALADEZ M.D., ATTENDING RADIOLOGIST  This document has been electronically signed. Jul 30 2017  3:24PM    < end of copied text >    CT Abdomen  Venous Dopplers: LE:   Others    < from: Transthoracic Echocardiogram (07.31.17 @ 19:44) >  regurgitation.  Aortic Valve/Aorta: Normal trileaflet aortic valve.  Aortic Root: 2.6 cm.  Left Atrium: Normal left atrium.  LA volume index = 19  cc/m2.  Left Ventricle: Hyperdynamic left ventricle. Mild  concentric left ventricular hypertrophy. Mild diastolic  dysfunction (Stage I).  Right Heart: Normal right atrium. Normal right ventricular  size and function. Normal tricuspid valve. Mild-moderate  tricuspid regurgitation. Normal pulmonic valve.  Pericardium/Pleura: Normal pericardium with no pericardial  effusion.  Hemodynamic: Estimated right atrial pressure is 8 mm Hg.  Estimated right ventricular systolic pressure equals 39 mm  Hg, assuming right atrial pressure equals 8 mm Hg,  consistent with borderline pulmonary hypertension.  ------------------------------------------------------------------------  Conclusions:  1. Mild concentric left ventricular hypertrophy.  2. Hyperdynamic left ventricle.  3. Mild diastolic dysfunction (Stage I).  4. Estimated pulmonary artery systolic pressure equals 39  mm Hg, assuming right atrial pressure equals 8 mm Hg,  consistent with borderline pulmonary pressures.  ------------------------------------------------------------------------  Confirmed on 7/31/2017 - 16:45:21 by Hernan Wilson M.D.    < end of copied text >

## 2017-08-01 NOTE — BEHAVIORAL HEALTH ASSESSMENT NOTE - NSBHCHARTREVIEWINVESTIGATE_PSY_A_CORE FT
Ventricular Rate 77 BPM    Atrial Rate 77 BPM    P-R Interval 150 ms    QRS Duration 74 ms     ms    QTc 414 ms    P Axis 84 degrees    R Axis 48 degrees    T Axis 62 degrees    Diagnosis Line NORMAL SINUS RHYTHM  NONSPECIFIC ST AND T WAVE ABNORMALITY

## 2017-08-01 NOTE — BEHAVIORAL HEALTH ASSESSMENT NOTE - NSBHCHARTREVIEWVS_PSY_A_CORE FT
Vital Signs Last 24 Hrs  T(C): 36.6 (01 Aug 2017 04:20), Max: 36.7 (31 Jul 2017 21:54)  T(F): 97.8 (01 Aug 2017 04:20), Max: 98.1 (31 Jul 2017 21:54)  HR: 69 (01 Aug 2017 04:20) (69 - 81)  BP: 158/78 (01 Aug 2017 04:20) (117/62 - 158/78)  BP(mean): --  RR: 18 (01 Aug 2017 04:20) (17 - 18)  SpO2: 96% (01 Aug 2017 04:20) (95% - 97%)

## 2017-08-02 ENCOUNTER — TRANSCRIPTION ENCOUNTER (OUTPATIENT)
Age: 82
End: 2017-08-02

## 2017-08-02 RX ORDER — SENNA PLUS 8.6 MG/1
2 TABLET ORAL
Qty: 0 | Refills: 0 | COMMUNITY
Start: 2017-08-02

## 2017-08-02 RX ORDER — LOSARTAN POTASSIUM 100 MG/1
1 TABLET, FILM COATED ORAL
Qty: 0 | Refills: 0 | COMMUNITY
Start: 2017-08-02

## 2017-08-02 RX ORDER — PETROLATUM,WHITE
1 JELLY (GRAM) TOPICAL
Qty: 0 | Refills: 0 | COMMUNITY
Start: 2017-08-02

## 2017-08-02 RX ORDER — LOSARTAN POTASSIUM 100 MG/1
100 TABLET, FILM COATED ORAL DAILY
Qty: 0 | Refills: 0 | Status: DISCONTINUED | OUTPATIENT
Start: 2017-08-02 | End: 2017-08-03

## 2017-08-02 RX ORDER — BACITRACIN ZINC 500 UNIT/G
1 OINTMENT IN PACKET (EA) TOPICAL THREE TIMES A DAY
Qty: 0 | Refills: 0 | Status: DISCONTINUED | OUTPATIENT
Start: 2017-08-02 | End: 2017-08-03

## 2017-08-02 RX ADMIN — HEPARIN SODIUM 5000 UNIT(S): 5000 INJECTION INTRAVENOUS; SUBCUTANEOUS at 13:23

## 2017-08-02 RX ADMIN — Medication 1 APPLICATION(S): at 05:08

## 2017-08-02 RX ADMIN — HEPARIN SODIUM 5000 UNIT(S): 5000 INJECTION INTRAVENOUS; SUBCUTANEOUS at 05:07

## 2017-08-02 RX ADMIN — CLOPIDOGREL BISULFATE 75 MILLIGRAM(S): 75 TABLET, FILM COATED ORAL at 12:10

## 2017-08-02 RX ADMIN — Medication 100 MILLIGRAM(S): at 12:10

## 2017-08-02 RX ADMIN — Medication 1 APPLICATION(S): at 17:26

## 2017-08-02 RX ADMIN — Medication 1 APPLICATION(S): at 21:47

## 2017-08-02 RX ADMIN — LOSARTAN POTASSIUM 50 MILLIGRAM(S): 100 TABLET, FILM COATED ORAL at 05:07

## 2017-08-02 RX ADMIN — HEPARIN SODIUM 5000 UNIT(S): 5000 INJECTION INTRAVENOUS; SUBCUTANEOUS at 21:47

## 2017-08-02 RX ADMIN — SENNA PLUS 2 TABLET(S): 8.6 TABLET ORAL at 21:47

## 2017-08-02 NOTE — PROGRESS NOTE ADULT - PROBLEM SELECTOR PLAN 4
continue Losartan but will increase dose   to 100 po qd  continue to monitor
continue Losartan  trend daily  well maintained for now
continue Losartan  trend daily  well maintained for now

## 2017-08-02 NOTE — PROVIDER CONTACT NOTE (OTHER) - ACTION/TREATMENT ORDERED:
no new orders given,will continue to monitor pt closely.
Forego evening fingerstick tonight and check in morning.
NP will come to bedside to discuss with patient.
OFFER BLOOD SUGAR CHECKS, MONITOR PATIENT

## 2017-08-02 NOTE — DISCHARGE NOTE ADULT - PATIENT PORTAL LINK FT
“You can access the [facility] Patient Portal, offered by Health system, by registering with the following website: [email or web address]”

## 2017-08-02 NOTE — DISCHARGE NOTE ADULT - CARE PROVIDER_API CALL
Josafat Mendoza), Internal Medicine  [address] [address] [place] [address]  Phone: [phone]  Fax: [phone]

## 2017-08-02 NOTE — DISCHARGE NOTE ADULT - PLAN OF CARE
Resolution of symptom Continue current regimen Low salt diet  Activity as tolerated.  Take all medication as prescribed.  Follow up with your medical doctor for routine blood pressure monitoring at your next visit.  Notify your doctor if you have any of the following symptoms:   Dizziness, Lightheadedness, Blurry vision, Headache, Chest pain, Shortness of breath HgA1C this admission.  Make sure you get your HgA1c checked every three months.  If you take oral diabetes medications, check your blood glucose two times a day.  If you take insulin, check your blood glucose before meals and at bedtime.  It's important not to skip any meals.  Keep a log of your blood glucose results and always take it with you to your doctor appointments.  Keep a list of your current medications including injectables and over the counter medications and bring this medication list with you to all your doctor appointments.  If you have not seen your ophthalmologist this year call for appointment.  Check your feet daily for redness, sores, or openings. Do not self treat. If no improvement in two days call your primary care physician for an appointment.  Low blood sugar (hypoglycemia) is a blood sugar below 70mg/dl. Check your blood sugar if you feel signs/symptoms of hypoglycemia. If your blood sugar is below 70 take 15 grams of carbohydrates (ex 4 oz of apple juice, 3-4 glucose tablets, or 4-6 oz of regular soda) wait 15 minutes and repeat blood sugar to make sure it comes up above 70.  If your blood sugar is above 70 and you are due for a meal, have a meal.  If you are not due for a meal have a snack.  This snack helps keeps your blood sugar at a safe range.

## 2017-08-02 NOTE — PROGRESS NOTE ADULT - ASSESSMENT
86yo female pt with PMHx of CVA 7/2015 with residual R sided weakness and pain, type 2 diabetes mellitus, HTN, was brought to ED by EMS c/o intermittent SOB for 1 week. PE not c/w CHF

## 2017-08-02 NOTE — DISCHARGE NOTE ADULT - MEDICATION SUMMARY - MEDICATIONS TO TAKE
I will START or STAY ON the medications listed below when I get home from the hospital:    oxyCODONE 5 mg oral tablet  -- 1 tab(s) by mouth every 8 hours, As needed, Moderate Pain (4 - 6)  -- Indication: For pain    losartan 100 mg oral tablet  -- 1 tab(s) by mouth once a day  -- Indication: For Hypertension    Januvia 50 mg oral tablet  -- 1 tab(s) by mouth once a day  -- Indication: For Diabetes    simvastatin 20 mg oral tablet  -- 1 tab(s) by mouth once a day (at bedtime)  -- Indication: For Hyperlipidemia    clopidogrel 75 mg oral tablet  -- 1 tab(s) by mouth once a day  -- Indication: For preventive measure    petrolatum topical ointment  -- 1 application on skin 2 times a day  -- Indication: For Skin    senna oral tablet  -- 2 tab(s) by mouth once a day (at bedtime)  -- Indication: For constipation    docusate sodium 100 mg oral capsule  -- 1 cap(s) by mouth 3 times a day  -- Indication: For constipation    pantoprazole 40 mg oral delayed release tablet  -- 1 tab(s) by mouth 2 times a day (1/2 hour before breakfast and dinner)  -- Indication: For preventive measure    calcium (as carbonate)-vitamin D 250 mg-125 intl units oral tablet  -- 1 tab(s) by mouth 2 times a day  -- Indication: For Supplement I will START or STAY ON the medications listed below when I get home from the hospital:    oxyCODONE 5 mg oral tablet  -- 1 tab(s) by mouth every 8 hours, As needed, Moderate Pain (4 - 6)  -- Indication: For pain    losartan 100 mg oral tablet  -- 1 tab(s) by mouth once a day  -- Indication: For Hypertension    Januvia 50 mg oral tablet  -- 1 tab(s) by mouth once a day  -- Indication: For Diabetes    simvastatin 20 mg oral tablet  -- 1 tab(s) by mouth once a day (at bedtime)  -- Indication: For Hyperlipidemia    clopidogrel 75 mg oral tablet  -- 1 tab(s) by mouth once a day  -- Indication: For preventive measure    petrolatum topical ointment  -- 1 application on skin 2 times a day  -- Indication: For Skin    bacitracin 500 units/g topical ointment  -- 1 application on skin 3 times a day  -- Indication: For fingers    senna oral tablet  -- 2 tab(s) by mouth once a day (at bedtime)  -- Indication: For constipation    docusate sodium 100 mg oral capsule  -- 1 cap(s) by mouth 3 times a day  -- Indication: For constipation    pantoprazole 40 mg oral delayed release tablet  -- 1 tab(s) by mouth 2 times a day (1/2 hour before breakfast and dinner)  -- Indication: For preventive measure    calcium (as carbonate)-vitamin D 250 mg-125 intl units oral tablet  -- 1 tab(s) by mouth 2 times a day  -- Indication: For Supplement

## 2017-08-02 NOTE — DISCHARGE NOTE ADULT - HOSPITAL COURSE
MD 88 y/o F w/ PMHx of CVA (7/2015 with residual R sided weakness and pain), T2DM & HTN presents c/o intermittent SOB for 1 week.    Dx: Dyspnea    seen by pulmonary  Recommend that shortness of breath was likely secondary to anxiety  work up negative    discontinued to Subacute Rehab

## 2017-08-02 NOTE — DISCHARGE NOTE ADULT - MEDICATION SUMMARY - MEDICATIONS TO STOP TAKING
I will STOP taking the medications listed below when I get home from the hospital:    acetaminophen 325 mg oral tablet  -- 2 tab(s) by mouth every 6 hours    guaiFENesin 100 mg/5 mL oral liquid  -- 10 milliliter(s) by mouth every 6 hours

## 2017-08-02 NOTE — DISCHARGE NOTE ADULT - CARE PLAN
Principal Discharge DX:	SOB (shortness of breath)  Goal:	Resolution of symptom  Instructions for follow-up, activity and diet:	Continue current regimen  Secondary Diagnosis:	Hypertension  Instructions for follow-up, activity and diet:	Low salt diet  Activity as tolerated.  Take all medication as prescribed.  Follow up with your medical doctor for routine blood pressure monitoring at your next visit.  Notify your doctor if you have any of the following symptoms:   Dizziness, Lightheadedness, Blurry vision, Headache, Chest pain, Shortness of breath  Secondary Diagnosis:	Anxiety  Instructions for follow-up, activity and diet:	Continue current regimen  Secondary Diagnosis:	Borderline diabetes  Instructions for follow-up, activity and diet:	HgA1C this admission.  Make sure you get your HgA1c checked every three months.  If you take oral diabetes medications, check your blood glucose two times a day.  If you take insulin, check your blood glucose before meals and at bedtime.  It's important not to skip any meals.  Keep a log of your blood glucose results and always take it with you to your doctor appointments.  Keep a list of your current medications including injectables and over the counter medications and bring this medication list with you to all your doctor appointments.  If you have not seen your ophthalmologist this year call for appointment.  Check your feet daily for redness, sores, or openings. Do not self treat. If no improvement in two days call your primary care physician for an appointment.  Low blood sugar (hypoglycemia) is a blood sugar below 70mg/dl. Check your blood sugar if you feel signs/symptoms of hypoglycemia. If your blood sugar is below 70 take 15 grams of carbohydrates (ex 4 oz of apple juice, 3-4 glucose tablets, or 4-6 oz of regular soda) wait 15 minutes and repeat blood sugar to make sure it comes up above 70.  If your blood sugar is above 70 and you are due for a meal, have a meal.  If you are not due for a meal have a snack.  This snack helps keeps your blood sugar at a safe range.

## 2017-08-03 VITALS
RESPIRATION RATE: 17 BRPM | HEART RATE: 86 BPM | SYSTOLIC BLOOD PRESSURE: 118 MMHG | TEMPERATURE: 100 F | OXYGEN SATURATION: 95 % | DIASTOLIC BLOOD PRESSURE: 76 MMHG

## 2017-08-03 PROCEDURE — 93005 ELECTROCARDIOGRAM TRACING: CPT

## 2017-08-03 PROCEDURE — 85610 PROTHROMBIN TIME: CPT

## 2017-08-03 PROCEDURE — 83880 ASSAY OF NATRIURETIC PEPTIDE: CPT

## 2017-08-03 PROCEDURE — 85730 THROMBOPLASTIN TIME PARTIAL: CPT

## 2017-08-03 PROCEDURE — 99285 EMERGENCY DEPT VISIT HI MDM: CPT | Mod: 25

## 2017-08-03 PROCEDURE — 97162 PT EVAL MOD COMPLEX 30 MIN: CPT

## 2017-08-03 PROCEDURE — 80053 COMPREHEN METABOLIC PANEL: CPT

## 2017-08-03 PROCEDURE — 71045 X-RAY EXAM CHEST 1 VIEW: CPT

## 2017-08-03 PROCEDURE — 82553 CREATINE MB FRACTION: CPT

## 2017-08-03 PROCEDURE — 84484 ASSAY OF TROPONIN QUANT: CPT

## 2017-08-03 PROCEDURE — 82550 ASSAY OF CK (CPK): CPT

## 2017-08-03 PROCEDURE — 85027 COMPLETE CBC AUTOMATED: CPT

## 2017-08-03 PROCEDURE — 93306 TTE W/DOPPLER COMPLETE: CPT

## 2017-08-03 RX ORDER — BACITRACIN ZINC 500 UNIT/G
1 OINTMENT IN PACKET (EA) TOPICAL
Qty: 0 | Refills: 0 | COMMUNITY
Start: 2017-08-03

## 2017-08-03 RX ADMIN — CLOPIDOGREL BISULFATE 75 MILLIGRAM(S): 75 TABLET, FILM COATED ORAL at 11:36

## 2017-08-03 RX ADMIN — Medication 100 MILLIGRAM(S): at 11:36

## 2017-08-03 RX ADMIN — LOSARTAN POTASSIUM 100 MILLIGRAM(S): 100 TABLET, FILM COATED ORAL at 05:41

## 2017-08-03 RX ADMIN — HEPARIN SODIUM 5000 UNIT(S): 5000 INJECTION INTRAVENOUS; SUBCUTANEOUS at 05:41

## 2017-08-03 RX ADMIN — Medication 1 APPLICATION(S): at 10:06

## 2017-08-03 RX ADMIN — Medication 1 APPLICATION(S): at 05:41

## 2017-08-03 NOTE — PROGRESS NOTE ADULT - PROBLEM SELECTOR PLAN 3
supportive care  PT eval Subacute Rehab   d/w 
Controlled
supportive care  PT eval Subacute Rehab   d/w   placement might be needed
supportive care  PT eval for dispo
Controlled

## 2017-08-03 NOTE — PROGRESS NOTE ADULT - PROBLEM SELECTOR PROBLEM 1
SOB (shortness of breath)

## 2017-08-03 NOTE — PROGRESS NOTE ADULT - PROBLEM SELECTOR PLAN 1
better  pulmonary note appreciated   doubt pulm etiology  echo noted normal lv fxn
better  pulmonary note appreciated   doubt pulm etiology  echo noted normal lv fxn  likely anxiety
today patient has been doing ok  she denies any anxiety and SOB She was stable overnight and has no cough and phlegm:  would continue current treatment and maintain sao2 above 90%%
unclear etiology, better today  last echo 2015 normal LV fxn   rpt pending patient refusing  doubt pulm etiology  pulm eval in AM Dr Reynolds
The SOB is most likely related to her anxiety: The chest radiograph is clear and she has no wheezing as well as her o2 sao2 , which is pretty good. Echo with only mild diastolic dysfunction!

## 2017-08-03 NOTE — PROGRESS NOTE ADULT - PROBLEM SELECTOR PLAN 2
check A1c labs pending  RISS with coverage
refusing labs and finger sticks  will defer to her wishes
refusing labs and finger sticks  will defer to her wishes  RISS with coverage as she allows
stable: PT/OT
stable: PT/OT

## 2017-09-01 ENCOUNTER — OUTPATIENT (OUTPATIENT)
Dept: OUTPATIENT SERVICES | Facility: HOSPITAL | Age: 82
LOS: 1 days | End: 2017-09-01
Payer: MEDICAID

## 2017-09-01 DIAGNOSIS — N20.0 CALCULUS OF KIDNEY: Chronic | ICD-10-CM

## 2017-09-01 DIAGNOSIS — Z98.890 OTHER SPECIFIED POSTPROCEDURAL STATES: Chronic | ICD-10-CM

## 2017-09-01 PROCEDURE — G9001: CPT

## 2017-09-05 ENCOUNTER — INPATIENT (INPATIENT)
Facility: HOSPITAL | Age: 82
LOS: 2 days | Discharge: HOME | DRG: 57 | End: 2017-09-08
Attending: INTERNAL MEDICINE | Admitting: INTERNAL MEDICINE
Payer: MEDICARE

## 2017-09-05 VITALS
RESPIRATION RATE: 18 BRPM | TEMPERATURE: 98 F | DIASTOLIC BLOOD PRESSURE: 75 MMHG | HEART RATE: 75 BPM | OXYGEN SATURATION: 96 % | SYSTOLIC BLOOD PRESSURE: 122 MMHG

## 2017-09-05 DIAGNOSIS — R53.1 WEAKNESS: ICD-10-CM

## 2017-09-05 DIAGNOSIS — Z98.890 OTHER SPECIFIED POSTPROCEDURAL STATES: Chronic | ICD-10-CM

## 2017-09-05 DIAGNOSIS — N20.0 CALCULUS OF KIDNEY: Chronic | ICD-10-CM

## 2017-09-05 LAB
ALBUMIN SERPL ELPH-MCNC: 4.2 G/DL — SIGNIFICANT CHANGE UP (ref 3.3–5)
ALP SERPL-CCNC: 57 U/L — SIGNIFICANT CHANGE UP (ref 40–120)
ALT FLD-CCNC: 9 U/L RC — LOW (ref 10–45)
ANION GAP SERPL CALC-SCNC: 13 MMOL/L — SIGNIFICANT CHANGE UP (ref 5–17)
AST SERPL-CCNC: 14 U/L — SIGNIFICANT CHANGE UP (ref 10–40)
BASOPHILS # BLD AUTO: 0.1 K/UL — SIGNIFICANT CHANGE UP (ref 0–0.2)
BASOPHILS NFR BLD AUTO: 0.6 % — SIGNIFICANT CHANGE UP (ref 0–2)
BILIRUB SERPL-MCNC: 0.3 MG/DL — SIGNIFICANT CHANGE UP (ref 0.2–1.2)
BUN SERPL-MCNC: 17 MG/DL — SIGNIFICANT CHANGE UP (ref 7–23)
CALCIUM SERPL-MCNC: 10 MG/DL — SIGNIFICANT CHANGE UP (ref 8.4–10.5)
CHLORIDE SERPL-SCNC: 105 MMOL/L — SIGNIFICANT CHANGE UP (ref 96–108)
CO2 SERPL-SCNC: 28 MMOL/L — SIGNIFICANT CHANGE UP (ref 22–31)
CREAT SERPL-MCNC: 0.71 MG/DL — SIGNIFICANT CHANGE UP (ref 0.5–1.3)
EOSINOPHIL # BLD AUTO: 0.1 K/UL — SIGNIFICANT CHANGE UP (ref 0–0.5)
EOSINOPHIL NFR BLD AUTO: 1.1 % — SIGNIFICANT CHANGE UP (ref 0–6)
GLUCOSE SERPL-MCNC: 114 MG/DL — HIGH (ref 70–99)
HCT VFR BLD CALC: 38.6 % — SIGNIFICANT CHANGE UP (ref 34.5–45)
HGB BLD-MCNC: 12.6 G/DL — SIGNIFICANT CHANGE UP (ref 11.5–15.5)
LYMPHOCYTES # BLD AUTO: 2.1 K/UL — SIGNIFICANT CHANGE UP (ref 1–3.3)
LYMPHOCYTES # BLD AUTO: 24.2 % — SIGNIFICANT CHANGE UP (ref 13–44)
MCHC RBC-ENTMCNC: 31.4 PG — SIGNIFICANT CHANGE UP (ref 27–34)
MCHC RBC-ENTMCNC: 32.6 GM/DL — SIGNIFICANT CHANGE UP (ref 32–36)
MCV RBC AUTO: 96.3 FL — SIGNIFICANT CHANGE UP (ref 80–100)
MONOCYTES # BLD AUTO: 0.8 K/UL — SIGNIFICANT CHANGE UP (ref 0–0.9)
MONOCYTES NFR BLD AUTO: 9.9 % — SIGNIFICANT CHANGE UP (ref 2–14)
NEUTROPHILS # BLD AUTO: 5.5 K/UL — SIGNIFICANT CHANGE UP (ref 1.8–7.4)
NEUTROPHILS NFR BLD AUTO: 64.2 % — SIGNIFICANT CHANGE UP (ref 43–77)
PLATELET # BLD AUTO: 253 K/UL — SIGNIFICANT CHANGE UP (ref 150–400)
POTASSIUM SERPL-MCNC: 3.9 MMOL/L — SIGNIFICANT CHANGE UP (ref 3.5–5.3)
POTASSIUM SERPL-SCNC: 3.9 MMOL/L — SIGNIFICANT CHANGE UP (ref 3.5–5.3)
PROT SERPL-MCNC: 7 G/DL — SIGNIFICANT CHANGE UP (ref 6–8.3)
RBC # BLD: 4.01 M/UL — SIGNIFICANT CHANGE UP (ref 3.8–5.2)
RBC # FLD: 12.4 % — SIGNIFICANT CHANGE UP (ref 10.3–14.5)
SODIUM SERPL-SCNC: 146 MMOL/L — HIGH (ref 135–145)
WBC # BLD: 8.5 K/UL — SIGNIFICANT CHANGE UP (ref 3.8–10.5)
WBC # FLD AUTO: 8.5 K/UL — SIGNIFICANT CHANGE UP (ref 3.8–10.5)

## 2017-09-05 PROCEDURE — 99285 EMERGENCY DEPT VISIT HI MDM: CPT

## 2017-09-05 NOTE — ED ADULT NURSE NOTE - OBJECTIVE STATEMENT
Pt presents to ED awake and alert, in the ED tonight as a visitor with her  who had a fall at home after the both of them were recently discharged from a rehab facility. Pt and her  live together and have had h/o multiple falls and medical issues. Pt denies new pain but reports right sided weakness and slight pain at baseline from a previous CVA. Pt also has h/o DM type 2 and HTN. Pt is A&Ox4, following all commands, interacting pleasantly with her .

## 2017-09-05 NOTE — ED PROVIDER NOTE - OBJECTIVE STATEMENT
87 year old female past medical history CVA (7/2015 with residual R sided weakness and pain), T2DM & HTN presents to the ED because she cannot take care of herself. Patient reports no pain or other complaints. patient is in ED with her  for head injury. They both were just discharged from rehab and her  had a fall. She reports she cannot take care of herself and cannot take care of her .    Dr. Arslna Estrella 87 year old female past medical history CVA (2015 with residual R sided weakness and pain), T2DM & HTN presents to the ED because she cannot take care of herself. Patient reports no pain or other complaints. patient is in ED with her  for head injury. They both were just discharged from rehab and her  had a fall. She reports she cannot take care of herself and cannot take care of her .    Dr. Arslan Estrella    Attendinyo female presents because she cannot go home.  Pt and her  were both discharged home from rehab this afternoon.  They were home for 3 hours when  fell and was brought to ED for evaluation.  Pt cannot go home because they have no home health aide at home to take care of them.  Pt is playing a video poker game and is comfortable.  no complaints at this time. 87 year old female past medical history CVA (2015 with residual R sided weakness and pain), T2DM & HTN presents to the ED because she cannot take care of herself. She feels too weak to be able to. Patient reports no pain or other complaints. patient is in ED with her  for head injury. They both were just discharged from rehab and her  had a fall. She reports she cannot take care of herself and cannot take care of her .    Dr. Arslan Estrella    Attendinyo female presents because she cannot go home.  Pt and her  were both discharged home from rehab this afternoon.  They were home for 3 hours when  fell and was brought to ED for evaluation.  Pt cannot go home because they have no home health aide at home to take care of them.  Pt is playing a video poker game and is comfortable.  no complaints at this time.

## 2017-09-05 NOTE — ED PROVIDER NOTE - MEDICAL DECISION MAKING DETAILS
87 year old female past medical history CVA (7/2015 with residual R sided weakness and pain), T2DM & HTN presents to the ED because she cannot take care of herself. Social work to see. Will need social admission for placement.

## 2017-09-06 DIAGNOSIS — I10 ESSENTIAL (PRIMARY) HYPERTENSION: ICD-10-CM

## 2017-09-06 DIAGNOSIS — I63.9 CEREBRAL INFARCTION, UNSPECIFIED: ICD-10-CM

## 2017-09-06 DIAGNOSIS — R69 ILLNESS, UNSPECIFIED: ICD-10-CM

## 2017-09-06 DIAGNOSIS — R53.1 WEAKNESS: ICD-10-CM

## 2017-09-06 DIAGNOSIS — E11.9 TYPE 2 DIABETES MELLITUS WITHOUT COMPLICATIONS: ICD-10-CM

## 2017-09-06 PROCEDURE — 99223 1ST HOSP IP/OBS HIGH 75: CPT | Mod: AI

## 2017-09-06 RX ORDER — DEXTROSE 50 % IN WATER 50 %
1 SYRINGE (ML) INTRAVENOUS ONCE
Qty: 0 | Refills: 0 | Status: DISCONTINUED | OUTPATIENT
Start: 2017-09-06 | End: 2017-09-08

## 2017-09-06 RX ORDER — DEXTROSE 50 % IN WATER 50 %
25 SYRINGE (ML) INTRAVENOUS ONCE
Qty: 0 | Refills: 0 | Status: DISCONTINUED | OUTPATIENT
Start: 2017-09-06 | End: 2017-09-08

## 2017-09-06 RX ORDER — INSULIN LISPRO 100/ML
VIAL (ML) SUBCUTANEOUS
Qty: 0 | Refills: 0 | Status: DISCONTINUED | OUTPATIENT
Start: 2017-09-06 | End: 2017-09-08

## 2017-09-06 RX ORDER — LOSARTAN POTASSIUM 100 MG/1
100 TABLET, FILM COATED ORAL DAILY
Qty: 0 | Refills: 0 | Status: DISCONTINUED | OUTPATIENT
Start: 2017-09-06 | End: 2017-09-08

## 2017-09-06 RX ORDER — DOCUSATE SODIUM 100 MG
100 CAPSULE ORAL THREE TIMES A DAY
Qty: 0 | Refills: 0 | Status: DISCONTINUED | OUTPATIENT
Start: 2017-09-06 | End: 2017-09-08

## 2017-09-06 RX ORDER — ACETAMINOPHEN 500 MG
650 TABLET ORAL EVERY 6 HOURS
Qty: 0 | Refills: 0 | Status: DISCONTINUED | OUTPATIENT
Start: 2017-09-06 | End: 2017-09-08

## 2017-09-06 RX ORDER — SODIUM CHLORIDE 9 MG/ML
1000 INJECTION, SOLUTION INTRAVENOUS
Qty: 0 | Refills: 0 | Status: DISCONTINUED | OUTPATIENT
Start: 2017-09-06 | End: 2017-09-08

## 2017-09-06 RX ORDER — IBUPROFEN 200 MG
400 TABLET ORAL ONCE
Qty: 0 | Refills: 0 | Status: COMPLETED | OUTPATIENT
Start: 2017-09-06 | End: 2017-09-06

## 2017-09-06 RX ORDER — SENNA PLUS 8.6 MG/1
2 TABLET ORAL AT BEDTIME
Qty: 0 | Refills: 0 | Status: DISCONTINUED | OUTPATIENT
Start: 2017-09-06 | End: 2017-09-08

## 2017-09-06 RX ORDER — CLOPIDOGREL BISULFATE 75 MG/1
75 TABLET, FILM COATED ORAL DAILY
Qty: 0 | Refills: 0 | Status: DISCONTINUED | OUTPATIENT
Start: 2017-09-06 | End: 2017-09-08

## 2017-09-06 RX ORDER — INSULIN LISPRO 100/ML
VIAL (ML) SUBCUTANEOUS AT BEDTIME
Qty: 0 | Refills: 0 | Status: DISCONTINUED | OUTPATIENT
Start: 2017-09-06 | End: 2017-09-08

## 2017-09-06 RX ORDER — SIMVASTATIN 20 MG/1
20 TABLET, FILM COATED ORAL AT BEDTIME
Qty: 0 | Refills: 0 | Status: DISCONTINUED | OUTPATIENT
Start: 2017-09-06 | End: 2017-09-08

## 2017-09-06 RX ORDER — GLUCAGON INJECTION, SOLUTION 0.5 MG/.1ML
1 INJECTION, SOLUTION SUBCUTANEOUS ONCE
Qty: 0 | Refills: 0 | Status: DISCONTINUED | OUTPATIENT
Start: 2017-09-06 | End: 2017-09-08

## 2017-09-06 RX ADMIN — Medication 100 MILLIGRAM(S): at 23:17

## 2017-09-06 RX ADMIN — Medication 1 TABLET(S): at 19:34

## 2017-09-06 RX ADMIN — LOSARTAN POTASSIUM 100 MILLIGRAM(S): 100 TABLET, FILM COATED ORAL at 05:11

## 2017-09-06 RX ADMIN — Medication 400 MILLIGRAM(S): at 12:35

## 2017-09-06 RX ADMIN — Medication 400 MILLIGRAM(S): at 12:03

## 2017-09-06 RX ADMIN — Medication 1 TABLET(S): at 05:10

## 2017-09-06 RX ADMIN — CLOPIDOGREL BISULFATE 75 MILLIGRAM(S): 75 TABLET, FILM COATED ORAL at 12:03

## 2017-09-06 RX ADMIN — Medication 100 MILLIGRAM(S): at 05:10

## 2017-09-06 RX ADMIN — Medication 650 MILLIGRAM(S): at 03:22

## 2017-09-06 RX ADMIN — Medication 650 MILLIGRAM(S): at 03:44

## 2017-09-06 NOTE — H&P ADULT - NSHPSOCIALHISTORY_GEN_ALL_CORE
Social History:    Marital Status:  (x   )    (   ) Single    (   )    (  )   Occupation: retired   Lives with: (  ) alone  (  ) children   ( x ) spouse  recently discharged from Rehab, unable to care for self     Substance Use (street drugs): ( x ) never used  (  ) other:  Tobacco Usage:  nonsmoker   Alcohol Usage: no etoh use

## 2017-09-06 NOTE — H&P ADULT - NSHPPHYSICALEXAM_GEN_ALL_CORE
Vital Signs Last 24 Hrs  T(C): 36.8 (06 Sep 2017 00:31), Max: 36.8 (06 Sep 2017 00:31)  T(F): 98.3 (06 Sep 2017 00:31), Max: 98.3 (06 Sep 2017 00:31)  HR: 75 (06 Sep 2017 00:31) (75 - 75)  BP: 126/63 (06 Sep 2017 00:31) (122/75 - 126/63)  BP(mean): --  RR: 18 (06 Sep 2017 00:31) (18 - 18)  SpO2: 95% (06 Sep 2017 00:31) (95% - 96%)    GENERAL: No acute distress, well-developed  HEAD:  Atraumatic, Normocephalic  ENT: EOMI, PERRLA, conjunctiva and sclera clear,  moist mucosa no pharyngeal erythema or exudates   NECK: supple , no JVD   CHEST/LUNG: Clear to auscultation bilaterally; No wheeze, equal breath sounds bilaterally   BACK: No spinal tenderness,  No CVA tenderness   HEART: Regular rate and rhythm; No murmurs, rubs, or gallops  ABDOMEN: Soft, Nontender, Nondistended; Bowel sounds present  EXTREMITIES:  No clubbing, cyanosis, or edema  MSK: No joint swelling or effusions, ROM intact   PSYCH: Normal behavior/affect  NEUROLOGY: AAOx3, residual right lower extremity weakness   SKIN: Normal color, No rashes or lesions

## 2017-09-06 NOTE — H&P ADULT - HISTORY OF PRESENT ILLNESS
Patient is an 87 year old female past medical history CVA (7/2015 with residual R sided weakness and pain), T2DM & HTN presents  for inability to take care of herself.   Patient reports  who is assist her with caring for her, had a fall with minor head trauma and is now admitted to the hospital. Patient and  were discharged on from subacute rehab on day of admission, shortly after arriving home the incident occurred. Currently their regular home attendant  has not been reestablished and they have no help at home.  Patient reports constipation  for the past few days, associated with distended abdomen , bloating and mild discomfort, relieved with stool softeners.

## 2017-09-06 NOTE — H&P ADULT - NSHPREVIEWOFSYSTEMS_GEN_ALL_CORE
CONSTITUTIONAL: No weakness, fevers or chills  EYES/ENT: No visual changes;  No dysphagia  NECK: No pain or stiffness  RESPIRATORY: No cough, wheezing, hemoptysis; No shortness of breath  CARDIOVASCULAR: No chest pain or palpitations; No lower extremity edema  EXTREMITIES: no le edema, cyanosis, clubbing  GASTROINTESTINAL: No abdominal or epigastric pain. No nausea, vomiting, or hematemesis; + constipation. No melena or hematochezia.  BACK: No back pain  GENITOURINARY: No dysuria, frequency or hematuria  NEUROLOGICAL: No numbness or weakness  MSK: no joint swelling or pain  SKIN: No itching, burning, rashes, or lesions   PSYCH: no agitation  All other review of systems is negative unless indicated above.

## 2017-09-06 NOTE — H&P ADULT - NSHPLABSRESULTS_GEN_ALL_CORE
Labs personally reviewed:                          12.6   8.5   )-----------( 253      ( 05 Sep 2017 21:49 )             38.6     09-05    146<H>  |  105  |  17  ----------------------------<  114<H>  3.9   |  28  |  0.71    Ca    10.0      05 Sep 2017 21:49    TPro  7.0  /  Alb  4.2  /  TBili  0.3  /  DBili  x   /  AST  14  /  ALT  9<L>  /  AlkPhos  57  09-05    CARDIAC MARKERS ( 05 Sep 2017 21:49 )  x     / <0.01 ng/mL / x     / x     / x          LIVER FUNCTIONS - ( 05 Sep 2017 21:49 )  Alb: 4.2 g/dL / Pro: 7.0 g/dL / ALK PHOS: 57 U/L / ALT: 9 U/L RC / AST: 14 U/L / GGT: x               CAPILLARY BLOOD GLUCOSE  90 (06 Sep 2017 00:54)          Imaging:  CXR ordered     EKG personally reviewed: nsr at 74 bpm ,  no acute st changes

## 2017-09-06 NOTE — PHYSICAL THERAPY INITIAL EVALUATION ADULT - PERTINENT HX OF CURRENT PROBLEM, REHAB EVAL
88 y/o F  presents  for inability to take care of herself. Pt reports  who assists her with caring for herself, had a fall with minor head trauma and is now admitted to the hospital. Pt and  were discharged from [facility] on day of admission, shortly after arriving home the incident occurred (cont below)

## 2017-09-06 NOTE — PHYSICAL THERAPY INITIAL EVALUATION ADULT - ADDITIONAL COMMENTS
(cont from above):Currently their regular home attendant  has not been reestablished and they have no help at home.  Pt reports constipation  for the past few days, associated with distended abdomen , bloating and mild discomfort, relieved with stool softeners. (cont from above):Currently their regular home attendant  has not been reestablished and they have no help at home.  Pt reports constipation  for the past few days, associated with distended abdomen , bloating and mild discomfort, relieved with stool softeners.    Pt lives in an apartment with her  +elevator access, endorses no stairs. Prior to admission pt was at rehab facility for respite care as primary caregiver for , now unable to care for him. Pt states she uses a RW for ambulation and is unable to ambulate long distances. Pt states using a wheelchair for most of the time at rehab. Pt has HHA 9-5 M-F.

## 2017-09-06 NOTE — PROVIDER CONTACT NOTE (OTHER) - SITUATION
Patient refusing fingersticks . As per patient it was low ( 89) this am and she did not take Januvia. So there is no need to recheck

## 2017-09-07 ENCOUNTER — EMERGENCY (EMERGENCY)
Facility: HOSPITAL | Age: 82
LOS: 1 days | Discharge: HOME | End: 2017-09-07
Attending: EMERGENCY MEDICINE | Admitting: EMERGENCY MEDICINE
Payer: MEDICARE

## 2017-09-07 ENCOUNTER — TRANSCRIPTION ENCOUNTER (OUTPATIENT)
Age: 82
End: 2017-09-07

## 2017-09-07 VITALS
RESPIRATION RATE: 18 BRPM | OXYGEN SATURATION: 100 % | DIASTOLIC BLOOD PRESSURE: 76 MMHG | TEMPERATURE: 99 F | SYSTOLIC BLOOD PRESSURE: 126 MMHG | HEART RATE: 70 BPM

## 2017-09-07 VITALS — OXYGEN SATURATION: 95 % | HEART RATE: 98 BPM | SYSTOLIC BLOOD PRESSURE: 159 MMHG | DIASTOLIC BLOOD PRESSURE: 84 MMHG

## 2017-09-07 DIAGNOSIS — N20.0 CALCULUS OF KIDNEY: Chronic | ICD-10-CM

## 2017-09-07 DIAGNOSIS — Z98.890 OTHER SPECIFIED POSTPROCEDURAL STATES: Chronic | ICD-10-CM

## 2017-09-07 DIAGNOSIS — R64 CACHEXIA: ICD-10-CM

## 2017-09-07 PROCEDURE — 99284 EMERGENCY DEPT VISIT MOD MDM: CPT

## 2017-09-07 PROCEDURE — 99283 EMERGENCY DEPT VISIT LOW MDM: CPT

## 2017-09-07 RX ADMIN — LOSARTAN POTASSIUM 100 MILLIGRAM(S): 100 TABLET, FILM COATED ORAL at 06:33

## 2017-09-07 RX ADMIN — Medication 100 MILLIGRAM(S): at 14:05

## 2017-09-07 RX ADMIN — Medication 100 MILLIGRAM(S): at 06:33

## 2017-09-07 RX ADMIN — CLOPIDOGREL BISULFATE 75 MILLIGRAM(S): 75 TABLET, FILM COATED ORAL at 14:04

## 2017-09-07 NOTE — DISCHARGE NOTE ADULT - MEDICATION SUMMARY - MEDICATIONS TO TAKE
I will START or STAY ON the medications listed below when I get home from the hospital:    losartan 100 mg oral tablet  -- 1 tab(s) by mouth once a day  -- Indication: For HTN    Januvia 50 mg oral tablet  -- 1 tab(s) by mouth once a day  -- Indication: For Diabetes    simvastatin 20 mg oral tablet  -- 1 tab(s) by mouth once a day (in the evening)  -- Indication: For Hyperlipidemia    clopidogrel 75 mg oral tablet  -- 1 tab(s) by mouth once a day  -- Indication: For Stroke    docusate sodium 100 mg oral capsule  -- 1 cap(s) by mouth once a day (at bedtime), As Needed  -- Indication: For Constipation    pantoprazole 40 mg oral delayed release tablet  -- 1 tab(s) by mouth 2 times a day (1/2 hour before breakfast and dinner)  -- Indication: For Heart burn    calcium (as carbonate)-vitamin D 250 mg-125 intl units oral tablet  -- 1 tab(s) by mouth 2 times a day  -- Indication: For Suppliment

## 2017-09-07 NOTE — ED PROVIDER NOTE - OBJECTIVE STATEMENT
86 yo female, history of non-Hodgkins lymphoma on rituximab, here with episodes of intermittent uncontrolled shaking today, conscious throughout, no focal limb shaking, more generalized.  No fevers, no recent illness though had a mild nonproductive cough recently.  Chronic urinary incontinence. \ Fany Santiago M.D: 87yoF admitted yesterday to [facility] for social reasons ( could not take care of self and no one at home to do it as  admitted s/p fall) brought back from nursing home due to difficulties with insurance. case managememnt and social work  tried to work it out, but pt will not be able to go to nursing home until tomorrow

## 2017-09-07 NOTE — DISCHARGE NOTE ADULT - PATIENT PORTAL LINK FT
“You can access the [employer] Patient Portal, offered by [facility], by registering with the following website: [email or web address]”

## 2017-09-07 NOTE — ED ADULT NURSE NOTE - OBJECTIVE STATEMENT
patient is  87yoF  AXO&4present in the ED for social reasons ( patient could not take care of herself and there no one at home to assist her.  Her  was  admitted s/p fall). patient was admitted to  ED x1 day went back to nursing home, then was brought back by EMS from nursing home due to difficulties with insurance. case management and social work  are working on her behalf , however  pt will not be able to go to nursing home until tomorrow, at this time patient denies any pain and discomfort. respiration even unlabored. plan of care explained to patient. will monitor support and safety provided.

## 2017-09-07 NOTE — ED PROVIDER NOTE - PROGRESS NOTE DETAILS
Attending MD Molina: patient re-evaluated. Resting comfortably without complaint, VSS. Patient awaiting social work and case management for placement in the morning Juliana Coelho DO: Pt signed out to me. Equal pulses b/l. Pt awake & alert, requesting water.

## 2017-09-07 NOTE — DISCHARGE NOTE ADULT - CARE PLAN
Principal Discharge DX:	Weakness  Goal:	perform PT at rehab  Instructions for follow-up, activity and diet:	as per PT  Secondary Diagnosis:	Diabetes  Goal:	ct home meds

## 2017-09-07 NOTE — PROGRESS NOTE ADULT - SUBJECTIVE AND OBJECTIVE BOX
Patient is a 87y old  Female who presents with a chief complaint of unable to care for self (06 Sep 2017 03:12)      SUBJECTIVE / OVERNIGHT EVENTS: No nausea, vomiting or diarrhea, no fever or chills, no dizziness or chest pain, no dysuria or hematuria, no joint pain or swelling  lying on the Emergency Department stretcher, in NAD  refusing to go anywhere or to cooperate with PT    MEDICATIONS  (STANDING):  docusate sodium 100 milliGRAM(s) Oral three times a day  simvastatin 20 milliGRAM(s) Oral at bedtime  clopidogrel Tablet 75 milliGRAM(s) Oral daily  calcium carbonate  625 mG + Vitamin D (OsCal 250 + D) 1 Tablet(s) Oral two times a day  insulin lispro (HumaLOG) corrective regimen sliding scale   SubCutaneous three times a day before meals  insulin lispro (HumaLOG) corrective regimen sliding scale   SubCutaneous at bedtime  dextrose 5%. 1000 milliLiter(s) (50 mL/Hr) IV Continuous <Continuous>  dextrose 50% Injectable 25 Gram(s) IV Push once  losartan 100 milliGRAM(s) Oral daily    MEDICATIONS  (PRN):  acetaminophen   Tablet. 650 milliGRAM(s) Oral every 6 hours PRN Mild Pain (1 - 3)  senna 2 Tablet(s) Oral at bedtime PRN Constipation  dextrose Gel 1 Dose(s) Oral once PRN Blood Glucose LESS THAN 70 milliGRAM(s)/deciliter  glucagon  Injectable 1 milliGRAM(s) IntraMuscular once PRN Glucose LESS THAN 70 milligrams/deciliter        CAPILLARY BLOOD GLUCOSE        I&O's Summary      PHYSICAL EXAM:  GENERAL: NAD, cachectic  HEAD:  Atraumatic, Normocephalic, poor dental hygiene  EYES: EOMI, PERRLA, conjunctiva and sclera clear  NECK: Supple, No JVD  CHEST/LUNG: Clear to auscultation bilaterally; No wheeze  HEART: Regular rate and rhythm; soft ejection systolic murmur best heard at left sternal border   ABDOMEN: Soft, Nontender, Nondistended; Bowel sounds present  EXTREMITIES:   no edema, No clubbing or cyanosis, + Peripheral Pulses,  PSYCH: AO x 3  NEUROLOGY: non-focal, not cooperative with full exam likely residual right weakness  SKIN: No rashes or lesions    LABS:                        12.6   8.5   )-----------( 253      ( 05 Sep 2017 21:49 )             38.6     09-05    146<H>  |  105  |  17  ----------------------------<  114<H>  3.9   |  28  |  0.71    Ca    10.0      05 Sep 2017 21:49    TPro  7.0  /  Alb  4.2  /  TBili  0.3  /  DBili  x   /  AST  14  /  ALT  9<L>  /  AlkPhos  57  09-05      CARDIAC MARKERS ( 05 Sep 2017 21:49 )  x     / <0.01 ng/mL / x     / x     / x                Consultant(s) Notes Reviewed:      Care Discussed with Consultants/Other Providers:    Contact Number, Dr Carter [phone]

## 2017-09-07 NOTE — ED PROVIDER NOTE - NS ED ROS FT
CONSTITUTIONAL: no fevers  HEENT: no dysphagia  CV: no chest pain  RESP: no SOB  GI: no nausea/vomiting  : +incontinence  DERM: no rash  MSK: no back pain  NEURO: no LOC  ENDO: no diabetes

## 2017-09-07 NOTE — DISCHARGE NOTE ADULT - CARE PROVIDER_API CALL
Josafat Mendoza), Internal Medicine  [id number] [address] [place] [address]  Phone: [phone]  Fax: [phone]

## 2017-09-07 NOTE — PROGRESS NOTE ADULT - PROBLEM SELECTOR PLAN 2
refusing to cooperate with PT  does not want a room separate from her   will defer to admitting department

## 2017-09-07 NOTE — ED PROVIDER NOTE - MEDICAL DECISION MAKING DETAILS
shaking episodes, do not sound like they are on the ictal/interictal spectrum, more c/w rigors, though afebrile here; will CT head, check labs, blood cultures, UA/culture, CXR, reassess; if all studies normal, my preference would be to send patient home for outpatient f/u.  patient/family in agreement with plan. hold overnight, dispo in AM

## 2017-09-07 NOTE — ED PROVIDER NOTE - PHYSICAL EXAMINATION
GEN: calm, cooperative, ENT: mucous membranes moist, HEAD: NCAT, CV: S1 S2 no murmurs, RESP: no respiratory distress, ABD: no abdominal TTP, MSK: moves all extremities, 5/5 b/l U/LE, no asterixis, no dysmetria, NEURO: awake, alert, oriented, PSYCH: affect normal Fany Santiago M.D.:   patient awake alert NAD .   LUNGS CTAB no wheeze no crackle.   CARD RRR no m/r/g.    Abdomen soft NT ND no rebound no guarding no CVA tenderness.   EXT WWP no edema no calf tenderness CV 2+DP/PT bilaterally.   neuro A&Ox3 gait normal.    skin warm and dry no rash  HEENT: moist mucous membranes, PERRL, EOMI

## 2017-09-07 NOTE — ED PROVIDER NOTE - CARE PLAN
Principal Discharge DX:	Activities of household maintenance  Instructions for follow-up, activity and diet:	ADL/inability to take care of ADL.

## 2017-09-07 NOTE — DISCHARGE NOTE ADULT - MEDICATION SUMMARY - MEDICATIONS TO STOP TAKING
I will STOP taking the medications listed below when I get home from the hospital:    oxyCODONE 5 mg oral tablet  -- 1 tab(s) by mouth every 8 hours, As needed, Moderate Pain (4 - 6)

## 2017-09-07 NOTE — DISCHARGE NOTE ADULT - HOSPITAL COURSE
see D/C note by MD 87 year old female past medical history CVA (7/2015 with residual R sided weakness and pain), T2DM & HTN presents to the ED because she cannot take care of herself. Pt and her  were both discharged home from rehab this afternoon.  They were home for 3 hours when  fell and was brought to ED for evaluation.  Pt cannot go home because they have no home health aide at home to take care of them   Right side Weakness    sent to [facility]  medically stable for discharge

## 2017-09-08 VITALS
HEART RATE: 83 BPM | DIASTOLIC BLOOD PRESSURE: 73 MMHG | TEMPERATURE: 99 F | SYSTOLIC BLOOD PRESSURE: 125 MMHG | RESPIRATION RATE: 18 BRPM | OXYGEN SATURATION: 95 %

## 2017-09-08 PROCEDURE — 80053 COMPREHEN METABOLIC PANEL: CPT

## 2017-09-08 PROCEDURE — 93005 ELECTROCARDIOGRAM TRACING: CPT

## 2017-09-08 PROCEDURE — 85027 COMPLETE CBC AUTOMATED: CPT

## 2017-09-08 PROCEDURE — 97162 PT EVAL MOD COMPLEX 30 MIN: CPT

## 2017-09-08 PROCEDURE — 99285 EMERGENCY DEPT VISIT HI MDM: CPT | Mod: 25

## 2017-09-08 PROCEDURE — 84484 ASSAY OF TROPONIN QUANT: CPT

## 2017-09-08 RX ORDER — HEPARIN SODIUM 5000 [USP'U]/ML
5000 INJECTION INTRAVENOUS; SUBCUTANEOUS EVERY 12 HOURS
Qty: 0 | Refills: 0 | Status: DISCONTINUED | OUTPATIENT
Start: 2017-09-08 | End: 2017-09-08

## 2017-09-08 NOTE — ED ADULT NURSE REASSESSMENT NOTE - NS ED NURSE REASSESS COMMENT FT1
Patient resting in bed vital signs stable, appears comfortable , denies any pain and discomfort at this time awaiting disposition. support and safety provided will monitor.
as per , transportation arranged for patient  at noon. Pt. sitting in stretcher comfortably, in no acute distress. Denies any pain/discomfort.
Report received from CINDY Rankin. Pt informed they are awaiting social work. Pt resting comfortably in stretcher in no acute distress. Safety and comfort maintained.

## 2017-09-08 NOTE — PROGRESS NOTE ADULT - ATTENDING COMMENTS
discharge when bad available
eventual discharge likely to subacute rehab when cleared by all services

## 2017-09-08 NOTE — PROGRESS NOTE ADULT - SUBJECTIVE AND OBJECTIVE BOX
Patient is a 87y old  Female who presents with a chief complaint of unable to care for self (07 Sep 2017 16:25)      SUBJECTIVE / OVERNIGHT EVENTS: No nausea, vomiting or diarrhea, no fever or chills, no dizziness or chest pain, no dysuria or hematuria, no joint pain or swelling  events noted, sent back from [facility] without being admitted to same    MEDICATIONS  (STANDING):  docusate sodium 100 milliGRAM(s) Oral three times a day  simvastatin 20 milliGRAM(s) Oral at bedtime  clopidogrel Tablet 75 milliGRAM(s) Oral daily  calcium carbonate  625 mG + Vitamin D (OsCal 250 + D) 1 Tablet(s) Oral two times a day  insulin lispro (HumaLOG) corrective regimen sliding scale   SubCutaneous three times a day before meals  insulin lispro (HumaLOG) corrective regimen sliding scale   SubCutaneous at bedtime  dextrose 5%. 1000 milliLiter(s) (50 mL/Hr) IV Continuous <Continuous>  dextrose 50% Injectable 25 Gram(s) IV Push once  losartan 100 milliGRAM(s) Oral daily    MEDICATIONS  (PRN):  acetaminophen   Tablet. 650 milliGRAM(s) Oral every 6 hours PRN Mild Pain (1 - 3)  senna 2 Tablet(s) Oral at bedtime PRN Constipation  dextrose Gel 1 Dose(s) Oral once PRN Blood Glucose LESS THAN 70 milliGRAM(s)/deciliter  glucagon  Injectable 1 milliGRAM(s) IntraMuscular once PRN Glucose LESS THAN 70 milligrams/deciliter        CAPILLARY BLOOD GLUCOSE  112 (07 Sep 2017 13:44)        I&O's Summary        LABS: none                      Consultant(s) Notes Reviewed:      Care Discussed with Consultants/Other Providers:    Contact Number, Dr Carter [phone]

## 2018-03-01 ENCOUNTER — INPATIENT (INPATIENT)
Facility: HOSPITAL | Age: 83
LOS: 12 days | Discharge: NURSING, REHAB OR LONG-TERM CARE | DRG: 948 | End: 2018-03-14
Attending: HOSPITALIST | Admitting: INTERNAL MEDICINE
Payer: MEDICARE

## 2018-03-01 VITALS
OXYGEN SATURATION: 98 % | RESPIRATION RATE: 16 BRPM | SYSTOLIC BLOOD PRESSURE: 152 MMHG | DIASTOLIC BLOOD PRESSURE: 80 MMHG | HEART RATE: 100 BPM

## 2018-03-01 DIAGNOSIS — R62.7 ADULT FAILURE TO THRIVE: ICD-10-CM

## 2018-03-01 DIAGNOSIS — N20.0 CALCULUS OF KIDNEY: Chronic | ICD-10-CM

## 2018-03-01 DIAGNOSIS — Z98.890 OTHER SPECIFIED POSTPROCEDURAL STATES: Chronic | ICD-10-CM

## 2018-03-01 PROCEDURE — 99223 1ST HOSP IP/OBS HIGH 75: CPT

## 2018-03-01 PROCEDURE — 99285 EMERGENCY DEPT VISIT HI MDM: CPT

## 2018-03-01 PROCEDURE — 99497 ADVNCD CARE PLAN 30 MIN: CPT | Mod: 25

## 2018-03-01 RX ORDER — INFLUENZA VIRUS VACCINE 15; 15; 15; 15 UG/.5ML; UG/.5ML; UG/.5ML; UG/.5ML
0.5 SUSPENSION INTRAMUSCULAR ONCE
Qty: 0 | Refills: 0 | Status: COMPLETED | OUTPATIENT
Start: 2018-03-01 | End: 2018-03-01

## 2018-03-01 NOTE — ED ADULT NURSE NOTE - OBJECTIVE STATEMENT
86 yo presents to the ED from home by EMS. A&Ox4. presents to the ED for social reasons as per EMS. according to EMS,  and pt was discharged from nursing home yesterday. pt and  had one home health aide for both of them 86 yo presents to the ED from home by EMS. A&Ox4. presents to the ED for social reasons as per EMS. according to EMS,  and pt was discharged from nursing home yesterday. pt and  had one home health aide for both of them, according to visiting nurse "it was not enough for the both of them". pt denies any symptoms. VSS. abd soft nontender. lung sounds clear bilaterally. nonlabored breathing. plan of care discussed. safety and comfort measures maintained.

## 2018-03-01 NOTE — ED PROVIDER NOTE - OBJECTIVE STATEMENT
88 y/o F pt with PMHx of HTN, stroke presents to the ED because her  is here. Pt wants to be in the hospital because she doesn't want to be home alone. She feels aggravated from what happened to her . Denies fall or any other complaints.

## 2018-03-01 NOTE — ED ADULT NURSE REASSESSMENT NOTE - NS ED NURSE REASSESS COMMENT FT1
Report received from   CINDY Doty  Pt resting comfortably, pts  in [place] as patient  Awaiting Social Work  Safety maintained at all times, bed in lowest position, call bell in hand.  Will continue to monitor closely.

## 2018-03-01 NOTE — ED PROVIDER NOTE - MEDICAL DECISION MAKING DETAILS
86 y/o F pt with PMHx of HTN, stroke presents to the ED because her  is here and she doesn't want to be home alone. 88 y/o F pt with PMHx of HTN, stroke bought to the hospital because her aid was concerned that she couldn't care for her at home. Accompanied  who was also bought to hospital for similar situation but also c/o of fall according to the . Pt has no complaints. Will contact social work for available home services. If able to discharge pt with addition services, may go home vs admission for social reasons

## 2018-03-02 DIAGNOSIS — Z29.9 ENCOUNTER FOR PROPHYLACTIC MEASURES, UNSPECIFIED: ICD-10-CM

## 2018-03-02 DIAGNOSIS — I63.9 CEREBRAL INFARCTION, UNSPECIFIED: ICD-10-CM

## 2018-03-02 DIAGNOSIS — Z71.89 OTHER SPECIFIED COUNSELING: ICD-10-CM

## 2018-03-02 DIAGNOSIS — Z79.899 OTHER LONG TERM (CURRENT) DRUG THERAPY: ICD-10-CM

## 2018-03-02 DIAGNOSIS — R53.81 OTHER MALAISE: ICD-10-CM

## 2018-03-02 DIAGNOSIS — C44.91 BASAL CELL CARCINOMA OF SKIN, UNSPECIFIED: ICD-10-CM

## 2018-03-02 DIAGNOSIS — E11.9 TYPE 2 DIABETES MELLITUS WITHOUT COMPLICATIONS: ICD-10-CM

## 2018-03-02 DIAGNOSIS — I10 ESSENTIAL (PRIMARY) HYPERTENSION: ICD-10-CM

## 2018-03-02 PROCEDURE — 99233 SBSQ HOSP IP/OBS HIGH 50: CPT

## 2018-03-02 RX ORDER — DOCUSATE SODIUM 100 MG
100 CAPSULE ORAL DAILY
Qty: 0 | Refills: 0 | Status: DISCONTINUED | OUTPATIENT
Start: 2018-03-02 | End: 2018-03-02

## 2018-03-02 RX ORDER — DEXTROSE 50 % IN WATER 50 %
1 SYRINGE (ML) INTRAVENOUS ONCE
Qty: 0 | Refills: 0 | Status: DISCONTINUED | OUTPATIENT
Start: 2018-03-02 | End: 2018-03-02

## 2018-03-02 RX ORDER — DEXTROSE 50 % IN WATER 50 %
12.5 SYRINGE (ML) INTRAVENOUS ONCE
Qty: 0 | Refills: 0 | Status: DISCONTINUED | OUTPATIENT
Start: 2018-03-02 | End: 2018-03-14

## 2018-03-02 RX ORDER — DEXTROSE 50 % IN WATER 50 %
25 SYRINGE (ML) INTRAVENOUS ONCE
Qty: 0 | Refills: 0 | Status: DISCONTINUED | OUTPATIENT
Start: 2018-03-02 | End: 2018-03-02

## 2018-03-02 RX ORDER — LOSARTAN POTASSIUM 100 MG/1
100 TABLET, FILM COATED ORAL DAILY
Qty: 0 | Refills: 0 | Status: DISCONTINUED | OUTPATIENT
Start: 2018-03-02 | End: 2018-03-14

## 2018-03-02 RX ORDER — SODIUM CHLORIDE 9 MG/ML
1000 INJECTION, SOLUTION INTRAVENOUS
Qty: 0 | Refills: 0 | Status: DISCONTINUED | OUTPATIENT
Start: 2018-03-02 | End: 2018-03-02

## 2018-03-02 RX ORDER — BACITRACIN ZINC 500 UNIT/G
1 OINTMENT IN PACKET (EA) TOPICAL DAILY
Qty: 0 | Refills: 0 | Status: DISCONTINUED | OUTPATIENT
Start: 2018-03-02 | End: 2018-03-14

## 2018-03-02 RX ORDER — HEPARIN SODIUM 5000 [USP'U]/ML
5000 INJECTION INTRAVENOUS; SUBCUTANEOUS EVERY 12 HOURS
Qty: 0 | Refills: 0 | Status: DISCONTINUED | OUTPATIENT
Start: 2018-03-02 | End: 2018-03-14

## 2018-03-02 RX ORDER — ACETAMINOPHEN 500 MG
650 TABLET ORAL EVERY 6 HOURS
Qty: 0 | Refills: 0 | Status: DISCONTINUED | OUTPATIENT
Start: 2018-03-02 | End: 2018-03-14

## 2018-03-02 RX ORDER — GLUCAGON INJECTION, SOLUTION 0.5 MG/.1ML
1 INJECTION, SOLUTION SUBCUTANEOUS ONCE
Qty: 0 | Refills: 0 | Status: DISCONTINUED | OUTPATIENT
Start: 2018-03-02 | End: 2018-03-02

## 2018-03-02 RX ORDER — DEXTROSE 50 % IN WATER 50 %
25 SYRINGE (ML) INTRAVENOUS ONCE
Qty: 0 | Refills: 0 | Status: DISCONTINUED | OUTPATIENT
Start: 2018-03-02 | End: 2018-03-14

## 2018-03-02 RX ORDER — CLOPIDOGREL BISULFATE 75 MG/1
75 TABLET, FILM COATED ORAL DAILY
Qty: 0 | Refills: 0 | Status: DISCONTINUED | OUTPATIENT
Start: 2018-03-02 | End: 2018-03-14

## 2018-03-02 RX ORDER — INSULIN LISPRO 100/ML
VIAL (ML) SUBCUTANEOUS AT BEDTIME
Qty: 0 | Refills: 0 | Status: DISCONTINUED | OUTPATIENT
Start: 2018-03-02 | End: 2018-03-14

## 2018-03-02 RX ORDER — HEPARIN SODIUM 5000 [USP'U]/ML
5000 INJECTION INTRAVENOUS; SUBCUTANEOUS EVERY 8 HOURS
Qty: 0 | Refills: 0 | Status: DISCONTINUED | OUTPATIENT
Start: 2018-03-02 | End: 2018-03-02

## 2018-03-02 RX ORDER — AMLODIPINE BESYLATE 2.5 MG/1
5 TABLET ORAL DAILY
Qty: 0 | Refills: 0 | Status: DISCONTINUED | OUTPATIENT
Start: 2018-03-02 | End: 2018-03-04

## 2018-03-02 RX ORDER — INSULIN LISPRO 100/ML
VIAL (ML) SUBCUTANEOUS
Qty: 0 | Refills: 0 | Status: DISCONTINUED | OUTPATIENT
Start: 2018-03-02 | End: 2018-03-14

## 2018-03-02 RX ADMIN — Medication 650 MILLIGRAM(S): at 10:49

## 2018-03-02 RX ADMIN — CLOPIDOGREL BISULFATE 75 MILLIGRAM(S): 75 TABLET, FILM COATED ORAL at 12:37

## 2018-03-02 RX ADMIN — Medication 650 MILLIGRAM(S): at 10:18

## 2018-03-02 RX ADMIN — Medication 100 MILLIGRAM(S): at 12:37

## 2018-03-02 RX ADMIN — Medication 1 TABLET(S): at 12:37

## 2018-03-02 RX ADMIN — Medication 1 APPLICATION(S): at 13:50

## 2018-03-02 RX ADMIN — LOSARTAN POTASSIUM 100 MILLIGRAM(S): 100 TABLET, FILM COATED ORAL at 06:36

## 2018-03-02 NOTE — H&P ADULT - NSHPPHYSICALEXAM_GEN_ALL_CORE
PHYSICAL EXAM:  GENERAL: NAD, well-groomed, well-developed  HEAD:  Atraumatic, Normocephalic  EYES: EOMI, PERRLA, conjunctiva and sclera clear  ENMT: No tonsillar erythema, exudates, or enlargement; Moist mucous membranes, Good dentition, No lesions  NECK: Supple, No JVD, Normal thyroid  CHEST/LUNG: Clear to percussion bilaterally; No rales, rhonchi, wheezing, or rubs  HEART: Regular rate and rhythm; No murmurs, rubs, or gallops no sig LE edema  ABDOMEN: Soft, Nontender, Nondistended; Bowel sounds present  EXTREMITIES:  2+ Peripheral Pulses, No clubbing, cyanosis  LYMPH: No lymphadenopathy noted  SKIN: No rashes or lesions  NERVOUS SYSTEM:  Alert & Oriented X3, Good concentration; Motor Strength 5/5 B/L upper, 5/5 LLE, 3/5 RLE, sensation grossly intact

## 2018-03-02 NOTE — PHYSICAL THERAPY INITIAL EVALUATION ADULT - TRANSFER SAFETY CONCERNS NOTED: SIT/STAND, REHAB EVAL
losing balance/decreased balance during turns/decreased step length/decreased weight-shifting ability

## 2018-03-02 NOTE — H&P ADULT - PROBLEM SELECTOR PLAN 4
-c/w dapt -c/w plavix  -not on asa  -holding statin as pt has documented allergy to statins, though was d/c on simvastatin in september

## 2018-03-02 NOTE — PHYSICAL THERAPY INITIAL EVALUATION ADULT - LEVEL OF INDEPENDENCE: SUPINE/SIT, REHAB EVAL
minimum assist (75% patients effort) moderate assist (50% patients effort)/minimum assist (75% patients effort)

## 2018-03-02 NOTE — H&P ADULT - NSHPREVIEWOFSYSTEMS_GEN_ALL_CORE
REVIEW OF SYSTEMS:  CONSTITUTIONAL: +weakness. No fevers. No chills. No weight loss. Good appetite.  EYES: No visual changes. No eye pain.  ENT: No hearing difficulty. No vertigo. No dysphagia. No Sinusitis/rhinorrhea.  NECK: No pain. No stiffness/rigidity.  CARDIAC: No chest pain. No palpitations.  RESPIRATORY: No cough. No SOB. No hemoptysis.  GASTROINTESTINAL: No abdominal pain. No nausea. No vomiting. No hematemesis. No diarrhea. No constipation. No melena. No hematochezia.  GENITOURINARY: No dysuria. No frequency. No hesitancy. No hematuria.  NEUROLOGICAL: No numbness. No focal weakness. No incontinence. No headache.  BACK: No back pain.  EXTREMITIES: No lower extremity edema. Full ROM.  SKIN: No rashes. No itching. No other lesions.  PSYCHIATRIC: No depression. No anxiety. No SI/HI.  ALLERGIC: No lip swelling. No hives.  All other review of systems is negative unless indicated above.

## 2018-03-02 NOTE — PHYSICAL THERAPY INITIAL EVALUATION ADULT - PERTINENT HX OF CURRENT PROBLEM, REHAB EVAL
86 yo F PMH CVA (7/2015 with residual R sided weakness and pain), T2DM & HTN, pw emotional distress and inability to care for herself in setting of her husbands current ER visit. Pt was in [facility] with her  and were sent home today; on transport, her  suffered a fall while being transferred from [facility].

## 2018-03-02 NOTE — PHYSICAL THERAPY INITIAL EVALUATION ADULT - ADDITIONAL COMMENTS
Pt and pt's  were admitted from rehab facility. Pt states they reside in an apartment building, no steps to negotiate. Pt states she was ambulating independently short distances using R/W but would require assist with all ADL's.

## 2018-03-02 NOTE — H&P ADULT - NSHPSOCIALHISTORY_GEN_ALL_CORE
Social History:    Marital Status:  (  x )    (   ) Single    (   )    (  )   Occupation: homemaker  Lives with: (  ) alone  (  ) children   (  x) spouse   (  ) parents  (  ) other    Substance Use (street drugs): (  x) never used  (  ) other:  Tobacco Usage:  ( x  ) never smoked   (   ) former smoker   (   ) current smoker  (     ) pack year  (        ) last cigarette date  Alcohol Usage: denies

## 2018-03-02 NOTE — H&P ADULT - NSHPLABSRESULTS_GEN_ALL_CORE
No labs available for personal review    No EKG available for personal review    No imaging available for personal review.

## 2018-03-02 NOTE — PHYSICAL THERAPY INITIAL EVALUATION ADULT - LEVEL OF INDEPENDENCE: SIT/SUPINE, REHAB EVAL
minimum assist (75% patients effort) minimum assist (75% patients effort)/moderate assist (50% patients effort)

## 2018-03-02 NOTE — PHYSICAL THERAPY INITIAL EVALUATION ADULT - IMPAIRMENTS CONTRIBUTING IMPAIRED BED MOBILITY, REHAB EVAL
decreased strength/impaired balance impaired postural control/impaired balance/decreased flexibility/decreased strength

## 2018-03-02 NOTE — PHYSICAL THERAPY INITIAL EVALUATION ADULT - ACTIVE RANGE OF MOTION EXAMINATION, REHAB EVAL
R UE AROM shoulder flexion limited to <90 degrees. LUE AROM shoulder flexion limited to ~100 degrees/Right UE Active ROM was WFL (within functional limits)/Left LE Active ROM was WFL (within functional limits)/Left UE Active ROM was WFL (within functional limits)/Right LE Active ROM was WFL (within functional limits) R UE AROM shoulder flexion limited to <90 degrees AAROM . LUE AROM shoulder flexion limited to ~100 degreesand R DF aarom to neutral/Right LE Active ROM was WFL (within functional limits)/Right UE Active ROM was WFL (within functional limits)/Left UE Active ROM was WFL (within functional limits)/Left LE Active ROM was WFL (within functional limits)

## 2018-03-02 NOTE — PROVIDER CONTACT NOTE (MEDICATION) - SITUATION
pt ordered for blood sugar monitoring and coverage w insulin via sliding scale. Also ordered for DVT proph. w sub Q heparin

## 2018-03-02 NOTE — H&P ADULT - PROBLEM SELECTOR PLAN 6
-patient does not recall medications, cannot perform med rec  -no paperwork present from rehab facility  -will utilize prior d/c paperwork to restart presumed home meds and have day team clarify with facility if possible.

## 2018-03-02 NOTE — PHYSICAL THERAPY INITIAL EVALUATION ADULT - IMPAIRED TRANSFERS: SIT/STAND, REHAB EVAL
impaired postural control/impaired sensory feedback/decreased strength/impaired balance/decreased endurance ; RLE shortr than other per pt has been this way w/ h/o scoliosis of spine , amb on ball of foot/decreased sensation

## 2018-03-02 NOTE — H&P ADULT - HISTORY OF PRESENT ILLNESS
87 year old female past medical history CVA (7/2015 with residual R sided weakness and pain), T2DM & HTN, presents today with emotional distress and inability to care for herself in setting of her husbands current ER visit. Pt was in KB with her  and were sent home today; on transport, her  suffered a fall while being transferred from [facility]. She has accompanied her  to the ER as she cannot stay at home alone without an appropriate level of aid.     VS: 97.8, 82, 137/80, 16, 94% Ra.  No labs sent in ER. 87 year old female past medical history CVA (7/2015 with residual R sided weakness and pain), T2DM & HTN, presents today with emotional distress and inability to care for herself in setting of her husbands current ER visit. Pt was in KB with her  and were sent home today; on transport, her  suffered a fall while being transferred from [facility]. During all of this, reportedly the HHA arranged by their rehab facility felt understaffed and left the premises.  She has accompanied her  to the ER as she cannot stay at home alone without an appropriate level of aid. Currently she has no specific acute complaints; denies CP/SOB/f/c, denies dysuria/rash/abd pain. Her only complaint at this time is chronic long standing R LE weakness/tingling post cva. Pt herself has not had any falls.  Pt states she walks with a rolling walker but feels weak.    VS: 97.8, 82, 137/80, 16, 94% Ra.  No labs sent in ER.

## 2018-03-02 NOTE — PHYSICAL THERAPY INITIAL EVALUATION ADULT - GAIT DEVIATIONS NOTED, PT EVAL
decreased cass/decreased step length/decreased stride length/decreased weight-shifting ability/increased time in double stance

## 2018-03-02 NOTE — PHYSICAL THERAPY INITIAL EVALUATION ADULT - IMPAIRMENTS CONTRIBUTING TO GAIT DEVIATIONS, PT EVAL
impaired sensory feedback/decreased strength/decreased sensation/impaired balance/cognition/decrease endurance mod unsteady tendency to retropulse/impaired postural control

## 2018-03-02 NOTE — H&P ADULT - PROBLEM SELECTOR PLAN 1
-deconditioning in elderly  -was sent from rehab to home, cannot walk 2/2 weakness and only minimally ambulatory with walker  -PT eval  -SW eval for more HHA hours at home given inability to care for self especially in setting of 's ER visit. -deconditioning in elderly  -was sent from rehab to home, cannot walk 2/2 weakness and only minimally ambulatory with walker  -PT eval  -SW eval for more HHA hours at home given inability to care for self especially in setting of 's ER visit.  -patient is being admitted as it is not a safe discharge from the ER as she cannot take care of her  needs on her own and does not have a reliable HHA at home.

## 2018-03-02 NOTE — PHYSICAL THERAPY INITIAL EVALUATION ADULT - PRECAUTIONS/LIMITATIONS, REHAB EVAL
During this, reportedly the HHA arranged by their rehab facility felt understaffed and left the premises. Pt reports she cannot stay at home without appropriate assist. +c/o chronic long standing R LE weakness/tingling post cva./fall precautions

## 2018-03-02 NOTE — PHYSICAL THERAPY INITIAL EVALUATION ADULT - IMPAIRMENTS FOUND, PT EVAL
aerobic capacity/endurance/gait, locomotion, and balance/muscle strength cognitive impairment/muscle strength/gait, locomotion, and balance/aerobic capacity/endurance/sensory integrity

## 2018-03-02 NOTE — PROVIDER CONTACT NOTE (MEDICATION) - ASSESSMENT
pt ambulates w walker and 1 assist, pt educated regarding diabetic management using insulin and monitoring BS w fingersticks and continues to refuse both medications.

## 2018-03-02 NOTE — H&P ADULT - ASSESSMENT
87 year old female past medical history CVA (7/2015 with residual R sided weakness and pain), T2DM & HTN, presents today with emotional distress and inability to care for herself in setting of her husbands current ER visit.

## 2018-03-02 NOTE — H&P ADULT - PROBLEM SELECTOR PLAN 7
-20 minutes spent clarifying advanced directives with patient and  at bedside  -At this time, patient demonstrates capacity with insight into condition, and chooses to be DNR/DNI  -All questions answered at bedside.

## 2018-03-02 NOTE — PHYSICAL THERAPY INITIAL EVALUATION ADULT - BALANCE DISTURBANCE, IDENTIFIED IMPAIRMENT CONTRIBUTE, REHAB EVAL
balance/decreased strength impaired sensory feedback/balance/decreased strength/impaired motor control/decreased sensation

## 2018-03-03 PROCEDURE — 99233 SBSQ HOSP IP/OBS HIGH 50: CPT

## 2018-03-03 RX ADMIN — Medication 1 APPLICATION(S): at 11:09

## 2018-03-03 RX ADMIN — CLOPIDOGREL BISULFATE 75 MILLIGRAM(S): 75 TABLET, FILM COATED ORAL at 12:29

## 2018-03-03 RX ADMIN — LOSARTAN POTASSIUM 100 MILLIGRAM(S): 100 TABLET, FILM COATED ORAL at 06:05

## 2018-03-04 PROCEDURE — 99233 SBSQ HOSP IP/OBS HIGH 50: CPT

## 2018-03-04 RX ORDER — AMLODIPINE BESYLATE 2.5 MG/1
5 TABLET ORAL DAILY
Qty: 0 | Refills: 0 | Status: DISCONTINUED | OUTPATIENT
Start: 2018-03-05 | End: 2018-03-07

## 2018-03-04 RX ADMIN — Medication 650 MILLIGRAM(S): at 18:56

## 2018-03-04 RX ADMIN — Medication 650 MILLIGRAM(S): at 17:01

## 2018-03-04 RX ADMIN — CLOPIDOGREL BISULFATE 75 MILLIGRAM(S): 75 TABLET, FILM COATED ORAL at 11:33

## 2018-03-04 RX ADMIN — LOSARTAN POTASSIUM 100 MILLIGRAM(S): 100 TABLET, FILM COATED ORAL at 05:53

## 2018-03-04 RX ADMIN — Medication 1 APPLICATION(S): at 11:33

## 2018-03-04 RX ADMIN — AMLODIPINE BESYLATE 5 MILLIGRAM(S): 2.5 TABLET ORAL at 05:53

## 2018-03-05 ENCOUNTER — TRANSCRIPTION ENCOUNTER (OUTPATIENT)
Age: 83
End: 2018-03-05

## 2018-03-05 PROCEDURE — 99232 SBSQ HOSP IP/OBS MODERATE 35: CPT

## 2018-03-05 RX ORDER — BACITRACIN ZINC 500 UNIT/G
1 OINTMENT IN PACKET (EA) TOPICAL
Qty: 0 | Refills: 0 | COMMUNITY
Start: 2018-03-05

## 2018-03-05 RX ORDER — DOCUSATE SODIUM 100 MG
100 CAPSULE ORAL
Qty: 0 | Refills: 0 | Status: DISCONTINUED | OUTPATIENT
Start: 2018-03-05 | End: 2018-03-14

## 2018-03-05 RX ORDER — DOCUSATE SODIUM 100 MG
10 CAPSULE ORAL
Qty: 0 | Refills: 0 | COMMUNITY
Start: 2018-03-05

## 2018-03-05 RX ORDER — DOCUSATE SODIUM 100 MG
100 CAPSULE ORAL THREE TIMES A DAY
Qty: 0 | Refills: 0 | Status: DISCONTINUED | OUTPATIENT
Start: 2018-03-05 | End: 2018-03-05

## 2018-03-05 RX ORDER — AMLODIPINE BESYLATE 2.5 MG/1
1 TABLET ORAL
Qty: 0 | Refills: 0 | COMMUNITY
Start: 2018-03-05

## 2018-03-05 RX ADMIN — CLOPIDOGREL BISULFATE 75 MILLIGRAM(S): 75 TABLET, FILM COATED ORAL at 12:11

## 2018-03-05 RX ADMIN — Medication 650 MILLIGRAM(S): at 17:50

## 2018-03-05 RX ADMIN — LOSARTAN POTASSIUM 100 MILLIGRAM(S): 100 TABLET, FILM COATED ORAL at 05:51

## 2018-03-05 RX ADMIN — Medication 100 MILLIGRAM(S): at 17:21

## 2018-03-05 RX ADMIN — AMLODIPINE BESYLATE 5 MILLIGRAM(S): 2.5 TABLET ORAL at 05:51

## 2018-03-05 RX ADMIN — Medication 1 APPLICATION(S): at 12:11

## 2018-03-05 RX ADMIN — Medication 650 MILLIGRAM(S): at 17:21

## 2018-03-05 NOTE — DISCHARGE NOTE ADULT - PATIENT PORTAL LINK FT
You can access the [facility] Patient Portal, offered by [facility] [place], by registering with the following website: [email or web address]

## 2018-03-05 NOTE — PROGRESS NOTE ADULT - ATTENDING COMMENTS
Plan of care d/w patient at bedside and her  (Rafael), also at bedside and currently admitted to the hospital.  The patient is unable to care for herself and her  at home; they would need a 24/7 HHA vs. dispo to LTCF.  Patient's only child (a son) is ; no other reliable next of kin in the area.  Plan d/w NP (Emilee) and CM (Michelle).    Sher Yip M.D.  Hospitalist  Pager: [phone]

## 2018-03-05 NOTE — DISCHARGE NOTE ADULT - INSTRUCTIONS
Diabetic diet If unable to tolerate diet, nausea, vomiting, fever above 100.3, chills, or an increase in pain, notify provider or return to ER.

## 2018-03-05 NOTE — DISCHARGE NOTE ADULT - ADDITIONAL INSTRUCTIONS
Make appointments to follow up with your out patient physicians.  Bring all discharge paperwork including discharge medication list to your follow up appointments. Make appointments to follow up with your out patient physicians.  Consider following with a geriatrician when discharged.  A geriatrician is listed below.   Bring all discharge paperwork including discharge medication list to your follow up appointments. Discharge to long term care facility  Make appointments to follow up with your out patient physicians.  Consider following with a geriatrician when discharged.  A geriatrician is listed below.   Bring all discharge paperwork including discharge medication list to your follow up appointments.

## 2018-03-05 NOTE — DISCHARGE NOTE ADULT - CARE PROVIDER_API CALL
Sher Yip), Internal Medicine  [address] [place] [address]  Phone: [phone]  Fax: [phone] Abigail Fuentes), Geriatric Medicine; Internal Medicine  [address] [address]  Phone: [phone]  Fax: [phone]

## 2018-03-05 NOTE — DISCHARGE NOTE ADULT - SECONDARY DIAGNOSIS.
Cerebrovascular accident (CVA), unspecified mechanism Essential hypertension Advanced care planning/counseling discussion Basal cell carcinoma Type 2 diabetes mellitus without complication, without long-term current use of insulin Constipation

## 2018-03-05 NOTE — DISCHARGE NOTE ADULT - MEDICATION SUMMARY - MEDICATIONS TO TAKE
I will START or STAY ON the medications listed below when I get home from the hospital:    losartan 100 mg oral tablet  -- 1 tab(s) by mouth once a day  -- Indication: For Essential hypertension    Januvia 50 mg oral tablet  -- 1 tab(s) by mouth once a day  -- Indication: For Type 2 diabetes mellitus without complication, without long-term current use of insulin    clopidogrel 75 mg oral tablet  -- 1 tab(s) by mouth once a day  -- Indication: For Cerebrovascular accident (CVA), unspecified mechanism    amLODIPine 5 mg oral tablet  -- 1 tab(s) by mouth once a day  -- Indication: For Essential hypertension    bacitracin 500 units/g topical ointment  -- 1 application on skin once a day  -- Indication: For Basal cell carcinoma    docusate sodium 10 mg/mL oral liquid  -- 10 milliliter(s) by mouth 2 times a day  -- Indication: For Constipation    senna oral tablet  -- 2 tab(s) by mouth once a day (at bedtime)  -- Indication: For Constipation    bisacodyl 5 mg oral delayed release tablet  -- 1 tab(s) by mouth once  -- Indication: For Constipation I will START or STAY ON the medications listed below when I get home from the hospital:    losartan 100 mg oral tablet  -- 1 tab(s) by mouth once a day  -- Indication: For Essential hypertension    Januvia 50 mg oral tablet  -- 1 tab(s) by mouth once a day  -- Indication: For Type 2 diabetes mellitus without complication, without long-term current use of insulin    clopidogrel 75 mg oral tablet  -- 1 tab(s) by mouth once a day  -- Indication: For Cerebrovascular accident (CVA), unspecified mechanism    bacitracin 500 units/g topical ointment  -- 1 application on skin once a day  -- Indication: For Basal cell carcinoma    docusate sodium 10 mg/mL oral liquid  -- 10 milliliter(s) by mouth 2 times a day  -- Indication: For Constipation    senna oral tablet  -- 2 tab(s) by mouth once a day (at bedtime)  -- Indication: For Constipation    bisacodyl 5 mg oral delayed release tablet  -- 1 tab(s) by mouth once a day, As Needed for constipation  -- Indication: For Constipation

## 2018-03-05 NOTE — DISCHARGE NOTE ADULT - HOSPITAL COURSE
86 y/o F PMH of CVA (7/2015 with residual R sided weakness/pain), T2DM not on insulin, HTN, BCC, recent d/c from [facility] presents to [facility] for inability to care for self.   LTCF placement. 86 y/o F PMH of CVA (7/2015 with residual R sided weakness/pain), T2DM not on insulin, HTN, BCC, recent d/c from [facility] presents to [facility] for inability to care for self.  Patient was admitted at the same time as her , who had a mechanical fall.  During the course of admission, patient's blood pressure remained stable.  She was started on a bowel regimen for her constipation and had a BM w/ relief of symptoms.  Patient w/ intermittent facial pain at the site of likely BCC, which she has had for over one year.  She will consider derm eval as outpatient.      On the day of discharge, patient's vitals were stable and her condition remained stable.  She was discharged to LTCF w/ outpatient PCP f/u.

## 2018-03-05 NOTE — DISCHARGE NOTE ADULT - CARE PLAN
Principal Discharge DX:	Failure to thrive in adult  Goal:	stable  Assessment and plan of treatment:	Improving well  pt wants to go home with aide  Secondary Diagnosis:	Cerebrovascular accident (CVA), unspecified mechanism  Goal:	stable  Assessment and plan of treatment:	Continue Plavix  Secondary Diagnosis:	Essential hypertension  Goal:	stable  Assessment and plan of treatment:	Follow up with your medical doctor to establish long term blood pressure treatment goals.  Secondary Diagnosis:	Advanced care planning/counseling discussion  Goal:	safe discharge  Assessment and plan of treatment:	Pt and spouse want to go home with home health aide  Secondary Diagnosis:	Basal cell carcinoma  Goal:	stable  Assessment and plan of treatment:	Continue current care  Secondary Diagnosis:	Type 2 diabetes mellitus without complication, without long-term current use of insulin  Goal:	stable  Assessment and plan of treatment:	HgA1C this admission.  Make sure you get your HgA1c checked every three months.  If you take oral diabetes medications, check your blood glucose two times a day.  If you take insulin, check your blood glucose before meals and at bedtime.  It's important not to skip any meals.  Keep a log of your blood glucose results and always take it with you to your doctor appointments.  Keep a list of your current medications including injectables and over the counter medications and bring this medication list with you to all your doctor appointments.  If you have not seen your ophthalmologist this year call for appointment.  Check your feet daily for redness, sores, or openings. Do not self treat. If no improvement in two days call your primary care physician for an appointment.  Low blood sugar (hypoglycemia) is a blood sugar below 70mg/dl. Check your blood sugar if you feel signs/symptoms of hypoglycemia. If your blood sugar is below 70 take 15 grams of carbohydrates (ex 4 oz of apple juice, 3-4 glucose tablets, or 4-6 oz of regular soda) wait 15 minutes and repeat blood sugar to make sure it comes up above 70.  If your blood sugar is above 70 and you are due for a meal, have a meal.  If you are not due for a meal have a snack.  This snack helps keeps your blood sugar at a safe range. Principal Discharge DX:	Debility  Goal:	stable  Assessment and plan of treatment:	Continue to work with an aide for ambulation  Continue w/ PT  Secondary Diagnosis:	Cerebrovascular accident (CVA), unspecified mechanism  Goal:	stable  Assessment and plan of treatment:	Continue Plavix  Secondary Diagnosis:	Essential hypertension  Goal:	stable  Assessment and plan of treatment:	Follow up with your medical doctor to establish long term blood pressure treatment goals.  Continue losartan.  Secondary Diagnosis:	Basal cell carcinoma  Assessment and plan of treatment:	Continue w/ bacitracin daily  - Patient to consider dermatology evaluation outpatient (had deferred for the past year)  Secondary Diagnosis:	Type 2 diabetes mellitus without complication, without long-term current use of insulin  Goal:	stable  Assessment and plan of treatment:	Continue home Januvia  Secondary Diagnosis:	Constipation  Goal:	stable  Assessment and plan of treatment:	Continue senna and colace.  Continue bisacodyl as needed. Principal Discharge DX:	Debility  Goal:	stable  Assessment and plan of treatment:	Continue to work with an aide for ambulation  Continue w/ PT  Secondary Diagnosis:	Cerebrovascular accident (CVA), unspecified mechanism  Goal:	stable  Assessment and plan of treatment:	Continue Plavix  Secondary Diagnosis:	Essential hypertension  Goal:	Follow up with your medical doctor to establish long term blood pressure treatment goals.  Assessment and plan of treatment:	Low salt diet  Activity as tolerated.  Take all medication as prescribed.  Follow up with your medical doctor for routine blood pressure monitoring at your next visit.  Notify your doctor if you have any of the following symptoms:   Dizziness, Lightheadedness, Blurry vision, Headache, Chest pain, Shortness of breath   Continue losartan.  Secondary Diagnosis:	Basal cell carcinoma  Assessment and plan of treatment:	Continue w/ bacitracin daily  - Patient to consider dermatology evaluation outpatient (had deferred for the past year)  Secondary Diagnosis:	Type 2 diabetes mellitus without complication, without long-term current use of insulin  Goal:	disease management  Assessment and plan of treatment:	HgA1C this admission.  Make sure you get your HgA1c checked every three months.  If you take oral diabetes medications, check your blood glucose two times a day.  If you take insulin, check your blood glucose before meals and at bedtime.  It's important not to skip any meals.  Keep a log of your blood glucose results and always take it with you to your doctor appointments.  Keep a list of your current medications including injectables and over the counter medications and bring this medication list with you to all your doctor appointments.  If you have not seen your ophthalmologist this year call for appointment.  Check your feet daily for redness, sores, or openings. Do not self treat. If no improvement in two days call your primary care physician for an appointment.  Low blood sugar (hypoglycemia) is a blood sugar below 70mg/dl. Check your blood sugar if you feel signs/symptoms of hypoglycemia. If your blood sugar is below 70 take 15 grams of carbohydrates (ex 4 oz of apple juice, 3-4 glucose tablets, or 4-6 oz of regular soda) wait 15 minutes and repeat blood sugar to make sure it comes up above 70.  If your blood sugar is above 70 and you are due for a meal, have a meal.  If you are not due for a meal have a snack.  This snack helps keeps your blood sugar at a safe range.  Continue home Januvia  Secondary Diagnosis:	Constipation  Goal:	no constipation  Assessment and plan of treatment:	Continue senna and colace.  Continue bisacodyl as needed.

## 2018-03-05 NOTE — DISCHARGE NOTE ADULT - PLAN OF CARE
stable Improving well  pt wants to go home with aide Continue Plavix Follow up with your medical doctor to establish long term blood pressure treatment goals. safe discharge Pt and spouse want to go home with home health aide Continue current care HgA1C this admission.  Make sure you get your HgA1c checked every three months.  If you take oral diabetes medications, check your blood glucose two times a day.  If you take insulin, check your blood glucose before meals and at bedtime.  It's important not to skip any meals.  Keep a log of your blood glucose results and always take it with you to your doctor appointments.  Keep a list of your current medications including injectables and over the counter medications and bring this medication list with you to all your doctor appointments.  If you have not seen your ophthalmologist this year call for appointment.  Check your feet daily for redness, sores, or openings. Do not self treat. If no improvement in two days call your primary care physician for an appointment.  Low blood sugar (hypoglycemia) is a blood sugar below 70mg/dl. Check your blood sugar if you feel signs/symptoms of hypoglycemia. If your blood sugar is below 70 take 15 grams of carbohydrates (ex 4 oz of apple juice, 3-4 glucose tablets, or 4-6 oz of regular soda) wait 15 minutes and repeat blood sugar to make sure it comes up above 70.  If your blood sugar is above 70 and you are due for a meal, have a meal.  If you are not due for a meal have a snack.  This snack helps keeps your blood sugar at a safe range. Continue to work with an aide for ambulation  Continue w/ PT Follow up with your medical doctor to establish long term blood pressure treatment goals.  Continue losartan. Continue w/ bacitracin daily  - Patient to consider dermatology evaluation outpatient (had deferred for the past year) Continue home Januvia Continue senna and colace.  Continue bisacodyl as needed. Low salt diet  Activity as tolerated.  Take all medication as prescribed.  Follow up with your medical doctor for routine blood pressure monitoring at your next visit.  Notify your doctor if you have any of the following symptoms:   Dizziness, Lightheadedness, Blurry vision, Headache, Chest pain, Shortness of breath   Continue losartan. disease management HgA1C this admission.  Make sure you get your HgA1c checked every three months.  If you take oral diabetes medications, check your blood glucose two times a day.  If you take insulin, check your blood glucose before meals and at bedtime.  It's important not to skip any meals.  Keep a log of your blood glucose results and always take it with you to your doctor appointments.  Keep a list of your current medications including injectables and over the counter medications and bring this medication list with you to all your doctor appointments.  If you have not seen your ophthalmologist this year call for appointment.  Check your feet daily for redness, sores, or openings. Do not self treat. If no improvement in two days call your primary care physician for an appointment.  Low blood sugar (hypoglycemia) is a blood sugar below 70mg/dl. Check your blood sugar if you feel signs/symptoms of hypoglycemia. If your blood sugar is below 70 take 15 grams of carbohydrates (ex 4 oz of apple juice, 3-4 glucose tablets, or 4-6 oz of regular soda) wait 15 minutes and repeat blood sugar to make sure it comes up above 70.  If your blood sugar is above 70 and you are due for a meal, have a meal.  If you are not due for a meal have a snack.  This snack helps keeps your blood sugar at a safe range.  Continue home Januvia no constipation

## 2018-03-05 NOTE — DISCHARGE NOTE ADULT - REASON FOR ADMISSION
emotional distress/unable to care for herself at home Emotional distress/unable to care for herself at home

## 2018-03-05 NOTE — DISCHARGE NOTE ADULT - CARE PROVIDERS DIRECT ADDRESSES
,DirectAddress_Unknown ,[email or web address]

## 2018-03-05 NOTE — PHYSICAL THERAPY INITIAL EVALUATION ADULT - WEIGHT-BEARING RESTRICTIONS: GAIT, REHAB EVAL
patient fell out of bed and injured right arm, unable to move without pain and left arm with skin tear
full weight-bearing

## 2018-03-06 DIAGNOSIS — K59.00 CONSTIPATION, UNSPECIFIED: ICD-10-CM

## 2018-03-06 PROCEDURE — 99233 SBSQ HOSP IP/OBS HIGH 50: CPT

## 2018-03-06 RX ORDER — SENNA PLUS 8.6 MG/1
2 TABLET ORAL AT BEDTIME
Qty: 0 | Refills: 0 | Status: DISCONTINUED | OUTPATIENT
Start: 2018-03-06 | End: 2018-03-14

## 2018-03-06 RX ADMIN — LOSARTAN POTASSIUM 100 MILLIGRAM(S): 100 TABLET, FILM COATED ORAL at 06:05

## 2018-03-06 RX ADMIN — AMLODIPINE BESYLATE 5 MILLIGRAM(S): 2.5 TABLET ORAL at 06:05

## 2018-03-06 RX ADMIN — Medication 650 MILLIGRAM(S): at 13:23

## 2018-03-06 RX ADMIN — CLOPIDOGREL BISULFATE 75 MILLIGRAM(S): 75 TABLET, FILM COATED ORAL at 12:50

## 2018-03-06 RX ADMIN — Medication 1 APPLICATION(S): at 11:56

## 2018-03-06 RX ADMIN — Medication 100 MILLIGRAM(S): at 06:05

## 2018-03-06 RX ADMIN — Medication 650 MILLIGRAM(S): at 12:53

## 2018-03-06 NOTE — PROGRESS NOTE ADULT - ATTENDING COMMENTS
Plan of care d/w patient at bedside and her  (Rafael), also at bedside and currently admitted to the hospital.  The patient is unable to care for herself and her  at home; they would need a 24/7 HHA vs. dispo to LTCF.  Patient's only child (a son) is ; no other reliable next of kin in the area or that patient stays in communication with.  Plan d/w NP (Belén Pal) and CM (Grace).    Sher Yip M.D.  Hospitalist  Pager: [phone]

## 2018-03-07 DIAGNOSIS — F43.20 ADJUSTMENT DISORDER, UNSPECIFIED: ICD-10-CM

## 2018-03-07 PROCEDURE — 99222 1ST HOSP IP/OBS MODERATE 55: CPT

## 2018-03-07 PROCEDURE — 99233 SBSQ HOSP IP/OBS HIGH 50: CPT

## 2018-03-07 RX ORDER — POLYETHYLENE GLYCOL 3350 17 G/17G
17 POWDER, FOR SOLUTION ORAL ONCE
Qty: 0 | Refills: 0 | Status: DISCONTINUED | OUTPATIENT
Start: 2018-03-07 | End: 2018-03-14

## 2018-03-07 RX ADMIN — Medication 100 MILLIGRAM(S): at 05:50

## 2018-03-07 RX ADMIN — CLOPIDOGREL BISULFATE 75 MILLIGRAM(S): 75 TABLET, FILM COATED ORAL at 11:59

## 2018-03-07 RX ADMIN — Medication 650 MILLIGRAM(S): at 22:02

## 2018-03-07 RX ADMIN — Medication 650 MILLIGRAM(S): at 22:32

## 2018-03-07 RX ADMIN — Medication 650 MILLIGRAM(S): at 12:30

## 2018-03-07 RX ADMIN — Medication 650 MILLIGRAM(S): at 11:59

## 2018-03-07 RX ADMIN — Medication 1 APPLICATION(S): at 12:00

## 2018-03-07 NOTE — BEHAVIORAL HEALTH ASSESSMENT NOTE - RISK ASSESSMENT
Low risk: Risk factors are chronic and unmodifiable, including advanced age, multiple medical issues and poor support. Protective factors include, no prior psychiatric history, supportive , denies SI/HI.

## 2018-03-07 NOTE — BEHAVIORAL HEALTH ASSESSMENT NOTE - NSBHCONSULTFOLLOWAFTERCARE_PSY_A_CORE FT
[facility] outpt. clinic: [phone]  [facility] outpt. walk in clinic : [phone] (9AM-7PM)

## 2018-03-07 NOTE — PROGRESS NOTE ADULT - ATTENDING COMMENTS
Plan of care d/w patient at bedside and her  (Rafael), also at bedside and currently admitted to the hospital.  Case d/w NP.  Case d/w CM (Grace) in detail.  Plan is to discharge to LTCF, although placement is complicated for insurance authorization.  Patient is NOT safe discharge home due to some cognitive disability and lack of family support.      Sher Yip M.D.  Hospitalist  Pager: [phone] Plan of care d/w patient at bedside and her  (Rafael), also at bedside and currently admitted to the hospital.  Case d/w NP.  Case d/w CM (Grace) in detail.  Plan is to discharge to LTCF, although placement is complicated for insurance authorization.  Patient is NOT safe discharge home due to some cognitive disability and lack of family support.  Geriatric psychiatry consult called, as patient may require legal guardianship.     Sher Yip M.D.  Hospitalist  Pager: [phone]

## 2018-03-07 NOTE — BEHAVIORAL HEALTH ASSESSMENT NOTE - NSBHCHARTREVIEWVS_PSY_A_CORE FT
Vital Signs Last 24 Hrs  T(C): 36.7 (07 Mar 2018 09:00), Max: 36.7 (06 Mar 2018 19:44)  T(F): 98 (07 Mar 2018 09:00), Max: 98.1 (06 Mar 2018 19:44)  HR: 80 (07 Mar 2018 09:00) (67 - 80)  BP: 130/70 (07 Mar 2018 09:00) (96/60 - 130/70)  BP(mean): --  RR: 16 (07 Mar 2018 09:00) (16 - 18)  SpO2: 95% (07 Mar 2018 09:00) (94% - 96%)

## 2018-03-07 NOTE — BEHAVIORAL HEALTH ASSESSMENT NOTE - CASE SUMMARY
Patient is a 88 y/o Female, retired,  with 1 son who passed away 5 y/a. domiciled with , with PMHx of CVA (7/2015 with residual R sided weakness and pain), T2DM & HTN, and no significant PPHx, admitted to medicine socially after her  who is the primary care giver became hospitalized. Psychiatry was consulted to evaluate pt for capacity to engage in discharge planning. Pt does have capacity to participate in discharge planning.

## 2018-03-07 NOTE — BEHAVIORAL HEALTH ASSESSMENT NOTE - SUMMARY
Patient is a 88 y/o Female, retired,  with 1 son who passed away 5 y/a. domiciled with , with PMHx of CVA (7/2015 with residual R sided weakness and pain), T2DM & HTN, and no significant PPHx, admitted to medicine socially after her  who is the primary care giver became hospitalized. Psychiatry was consulted to evaluate pt for capacity to engage in discharge planning.    Pt was AAOX3, able to sustain attention and concentration through out evaluation. She was expressive, able to describe the nature of a long term care facility, talk about the risks associated with returning home and benefits of going to a LTCF. Her thought process was rational and logical through out the evaluation. Pt at this time has capacity to engage in discharge planning.

## 2018-03-07 NOTE — BEHAVIORAL HEALTH ASSESSMENT NOTE - NSBHSUICPROTECTFACT_PSY_A_CORE
Responsibility to family and others/Identifies reasons for living/Future oriented/Supportive social network or family/High spirituality

## 2018-03-07 NOTE — BEHAVIORAL HEALTH ASSESSMENT NOTE - HPI (INCLUDE ILLNESS QUALITY, SEVERITY, DURATION, TIMING, CONTEXT, MODIFYING FACTORS, ASSOCIATED SIGNS AND SYMPTOMS)
Patient is a 86 y/o Female, retired,  with 1 son who passed away 5 y/a. domiciled with , with PMHx of CVA (2015 with residual R sided weakness and pain), T2DM & HTN, and no significant PPHx, admitted to medicine socially after her  who is the primary care giver became hospitalized. Psychiatry was consulted to evaluate pt for capacity to engage in discharge planning.    Pt found laying in bed, made good eye contact, engages appropriately in interview. Pt reports being aware that she can not return to her apartment because she and her  requires more care. She reports being aware of the risk of returning to her apartment with not enough help and the possibility of something bad happening. She was able to verbalize the nature of a LTCF, the safety of being in such a facility and expressed wish to go there, together with her . She talked about the various things she has in her apartment that she will miss but realizes that those are only things. The most important thing to her is her  and as long as pt's  is to be with her, pt has no concerns regarding moving to LTCF. Pt engaged well in mental status testing, was AAOX3.    Pt denied neurovegetative symptoms associated with depression, reported enjoying many activities such as watching TV, talks at length about the recent academy awards and talks appropriately about the political situation in [place]. She reports living for her  whom she loves, but also mentions missing her son who  5 y/a. She is spiritual, being a reformed practitioner of Orthodoxy, denied SI/HI. She reported feeling anxious some times but able to talk it out with her . She denied panic attacks, denied manic symptoms, denied perceptual disturbances, denied paranoid or grandiose delusions.     at bedside confirms that pt has been well and has had no psychiatric issues in the past or at present.

## 2018-03-08 PROCEDURE — 99233 SBSQ HOSP IP/OBS HIGH 50: CPT

## 2018-03-08 RX ORDER — POLYETHYLENE GLYCOL 3350 17 G/17G
17 POWDER, FOR SOLUTION ORAL ONCE
Qty: 0 | Refills: 0 | Status: DISCONTINUED | OUTPATIENT
Start: 2018-03-08 | End: 2018-03-14

## 2018-03-08 RX ADMIN — Medication 650 MILLIGRAM(S): at 17:40

## 2018-03-08 RX ADMIN — LOSARTAN POTASSIUM 100 MILLIGRAM(S): 100 TABLET, FILM COATED ORAL at 05:47

## 2018-03-08 RX ADMIN — CLOPIDOGREL BISULFATE 75 MILLIGRAM(S): 75 TABLET, FILM COATED ORAL at 11:17

## 2018-03-08 RX ADMIN — Medication 100 MILLIGRAM(S): at 05:47

## 2018-03-08 RX ADMIN — Medication 650 MILLIGRAM(S): at 18:10

## 2018-03-08 RX ADMIN — Medication 1 APPLICATION(S): at 11:17

## 2018-03-08 NOTE — PROGRESS NOTE ADULT - ATTENDING COMMENTS
Plan of care d/w patient at bedside and her  (Rafael), also at bedside and currently admitted to the hospital.  Case d/w NP (Melinda) and CM (Michelle).  Patient pending LTCF placement.  Psychiatry consult appreciated; patient has capacity to make decision to move to LTCF.     Sher Yip M.D.  Hospitalist  Pager: [phone]

## 2018-03-09 PROCEDURE — 99233 SBSQ HOSP IP/OBS HIGH 50: CPT

## 2018-03-09 RX ORDER — SENNA PLUS 8.6 MG/1
2 TABLET ORAL
Qty: 0 | Refills: 0 | COMMUNITY
Start: 2018-03-09

## 2018-03-09 RX ADMIN — Medication 1 APPLICATION(S): at 11:57

## 2018-03-09 RX ADMIN — HEPARIN SODIUM 5000 UNIT(S): 5000 INJECTION INTRAVENOUS; SUBCUTANEOUS at 05:07

## 2018-03-09 RX ADMIN — CLOPIDOGREL BISULFATE 75 MILLIGRAM(S): 75 TABLET, FILM COATED ORAL at 11:56

## 2018-03-09 RX ADMIN — Medication 100 MILLIGRAM(S): at 05:07

## 2018-03-09 RX ADMIN — SENNA PLUS 2 TABLET(S): 8.6 TABLET ORAL at 21:54

## 2018-03-09 RX ADMIN — LOSARTAN POTASSIUM 100 MILLIGRAM(S): 100 TABLET, FILM COATED ORAL at 05:07

## 2018-03-09 NOTE — PROGRESS NOTE ADULT - ATTENDING COMMENTS
Plan of care d/w patient at bedside and her  (Rafael), also at bedside and currently admitted to the hospital.  Case d/w NP (Sasha) and CM (Michelle).  d/w SW (Gurwinder).  Patient pending LTCF placement; patient has capacity to make decision to move to LTCF as per psych.  She is medically stable for discharge.    Discharge time 35 minutes.     Sher Yip M.D.  Hospitalist  Pager: [phone] Plan of care d/w patient at bedside and her  (Rafael), also at bedside and currently admitted to the hospital.  Case d/w NP (Sasha) and CM (Michelle).  d/w SW (Gurwinder).  Patient pending LTCF placement; patient has capacity to make decision to move to LTCF as per psych.  She is medically stable for discharge.    Sher Yip M.D.  Hospitalist  Pager: [phone]

## 2018-03-10 PROCEDURE — 99233 SBSQ HOSP IP/OBS HIGH 50: CPT

## 2018-03-10 RX ORDER — BACITRACIN ZINC 500 UNIT/G
1 OINTMENT IN PACKET (EA) TOPICAL EVERY 6 HOURS
Qty: 0 | Refills: 0 | Status: DISCONTINUED | OUTPATIENT
Start: 2018-03-10 | End: 2018-03-14

## 2018-03-10 RX ADMIN — Medication 100 MILLIGRAM(S): at 18:15

## 2018-03-10 RX ADMIN — Medication 1 APPLICATION(S): at 13:12

## 2018-03-10 RX ADMIN — SENNA PLUS 2 TABLET(S): 8.6 TABLET ORAL at 22:29

## 2018-03-10 RX ADMIN — Medication 1 APPLICATION(S): at 18:13

## 2018-03-10 RX ADMIN — Medication 1 APPLICATION(S): at 13:13

## 2018-03-10 RX ADMIN — HEPARIN SODIUM 5000 UNIT(S): 5000 INJECTION INTRAVENOUS; SUBCUTANEOUS at 18:15

## 2018-03-10 RX ADMIN — Medication 100 MILLIGRAM(S): at 05:56

## 2018-03-10 RX ADMIN — LOSARTAN POTASSIUM 100 MILLIGRAM(S): 100 TABLET, FILM COATED ORAL at 05:56

## 2018-03-10 RX ADMIN — CLOPIDOGREL BISULFATE 75 MILLIGRAM(S): 75 TABLET, FILM COATED ORAL at 13:12

## 2018-03-10 RX ADMIN — HEPARIN SODIUM 5000 UNIT(S): 5000 INJECTION INTRAVENOUS; SUBCUTANEOUS at 05:57

## 2018-03-10 RX ADMIN — Medication 1 APPLICATION(S): at 23:35

## 2018-03-10 NOTE — PROGRESS NOTE ADULT - PROBLEM SELECTOR PLAN 7
DVT ppx: HSQ  Code: DNR/DNI  Dispo: LTCF, accepted to [facility] (needs insurance auth) but pt refuses to go there DVT ppx: HSQ  Code: DNR/DNI  Dispo: LTCF, accepting beds available at [facility] (needs insurance auth) but pt refuses to go there. CM made aware

## 2018-03-11 PROCEDURE — 99233 SBSQ HOSP IP/OBS HIGH 50: CPT

## 2018-03-11 RX ADMIN — HEPARIN SODIUM 5000 UNIT(S): 5000 INJECTION INTRAVENOUS; SUBCUTANEOUS at 06:26

## 2018-03-11 RX ADMIN — Medication 1 APPLICATION(S): at 17:31

## 2018-03-11 RX ADMIN — Medication 100 MILLIGRAM(S): at 06:26

## 2018-03-11 RX ADMIN — CLOPIDOGREL BISULFATE 75 MILLIGRAM(S): 75 TABLET, FILM COATED ORAL at 11:54

## 2018-03-11 RX ADMIN — Medication 1 APPLICATION(S): at 06:26

## 2018-03-11 RX ADMIN — Medication 1 APPLICATION(S): at 11:55

## 2018-03-11 RX ADMIN — HEPARIN SODIUM 5000 UNIT(S): 5000 INJECTION INTRAVENOUS; SUBCUTANEOUS at 17:31

## 2018-03-11 RX ADMIN — LOSARTAN POTASSIUM 100 MILLIGRAM(S): 100 TABLET, FILM COATED ORAL at 06:26

## 2018-03-11 NOTE — PROGRESS NOTE ADULT - PROBLEM SELECTOR PLAN 7
DVT ppx: HSQ  Code: DNR/DNI  Dispo: LTCF, accepting beds available at [facility] (needs insurance auth) but pt refuses to go there. CM made aware

## 2018-03-11 NOTE — PROVIDER CONTACT NOTE (OTHER) - RECOMMENDATIONS
Pt educated on importance of testing BG, pt continues to refuse
medicate if needed, recheck BP in 1 hour.

## 2018-03-12 PROCEDURE — 99232 SBSQ HOSP IP/OBS MODERATE 35: CPT

## 2018-03-12 RX ADMIN — Medication 1 APPLICATION(S): at 12:19

## 2018-03-12 RX ADMIN — CLOPIDOGREL BISULFATE 75 MILLIGRAM(S): 75 TABLET, FILM COATED ORAL at 12:19

## 2018-03-12 RX ADMIN — Medication 100 MILLIGRAM(S): at 06:45

## 2018-03-12 RX ADMIN — HEPARIN SODIUM 5000 UNIT(S): 5000 INJECTION INTRAVENOUS; SUBCUTANEOUS at 06:44

## 2018-03-12 RX ADMIN — Medication 1 APPLICATION(S): at 17:46

## 2018-03-12 RX ADMIN — Medication 1 APPLICATION(S): at 00:05

## 2018-03-12 RX ADMIN — HEPARIN SODIUM 5000 UNIT(S): 5000 INJECTION INTRAVENOUS; SUBCUTANEOUS at 17:46

## 2018-03-12 RX ADMIN — LOSARTAN POTASSIUM 100 MILLIGRAM(S): 100 TABLET, FILM COATED ORAL at 06:45

## 2018-03-12 RX ADMIN — Medication 1 APPLICATION(S): at 06:45

## 2018-03-12 NOTE — PROGRESS NOTE ADULT - PROBLEM SELECTOR PLAN 7
DVT ppx: HSQ  Code: DNR/DNI  Dispo: LTCF, had accepting beds at [facility] (needs insurance auth), pt refusing to go there over weekend. Social work for placement

## 2018-03-12 NOTE — PROVIDER CONTACT NOTE (OTHER) - SITUATION
Pt continues to refuse to have BG taken - pt here for FTT,  in neighboring bed
pt w PT at bedside VS assessed and BP elevated
Pt refusing fingerstick at this time.

## 2018-03-13 PROCEDURE — 99232 SBSQ HOSP IP/OBS MODERATE 35: CPT

## 2018-03-13 RX ADMIN — HEPARIN SODIUM 5000 UNIT(S): 5000 INJECTION INTRAVENOUS; SUBCUTANEOUS at 07:07

## 2018-03-13 RX ADMIN — Medication 1 APPLICATION(S): at 12:41

## 2018-03-13 RX ADMIN — Medication 100 MILLIGRAM(S): at 17:19

## 2018-03-13 RX ADMIN — Medication 1 APPLICATION(S): at 07:05

## 2018-03-13 RX ADMIN — Medication 1 APPLICATION(S): at 12:40

## 2018-03-13 RX ADMIN — SENNA PLUS 2 TABLET(S): 8.6 TABLET ORAL at 21:51

## 2018-03-13 RX ADMIN — Medication 1 APPLICATION(S): at 21:58

## 2018-03-13 RX ADMIN — Medication 1 APPLICATION(S): at 17:19

## 2018-03-13 RX ADMIN — CLOPIDOGREL BISULFATE 75 MILLIGRAM(S): 75 TABLET, FILM COATED ORAL at 12:38

## 2018-03-13 RX ADMIN — LOSARTAN POTASSIUM 100 MILLIGRAM(S): 100 TABLET, FILM COATED ORAL at 07:07

## 2018-03-13 NOTE — DIETITIAN INITIAL EVALUATION ADULT. - ORAL INTAKE PTA
good/reports she was eating well at rehab PTA, 3 meals daily of a variety of foods; states her Finger sticks were not being checked at rehab since her diabetes is "borderline"

## 2018-03-13 NOTE — DIETITIAN INITIAL EVALUATION ADULT. - NS AS NUTRI DX OTHER
As per Pt request recommend liberalize diet to soft diet- may consider DC consistent CHO restriction given Pt age. Encouraged continued good PO intake. RD to provide food preferences-nutrient dense snacks.

## 2018-03-13 NOTE — DIETITIAN INITIAL EVALUATION ADULT. - PROBLEM SELECTOR PLAN 1
-deconditioning in elderly  -was sent from rehab to home, cannot walk 2/2 weakness and only minimally ambulatory with walker  -PT eval  -SW eval for more HHA hours at home given inability to care for self especially in setting of 's ER visit.  -patient is being admitted as it is not a safe discharge from the ER as she cannot take care of her  needs on her own and does not have a reliable HHA at home.

## 2018-03-13 NOTE — DIETITIAN INITIAL EVALUATION ADULT. - PHYSICAL APPEARANCE
elderly; Pt agreeable to nutrition focused physical exam, Pt c age related changes around shoulders, mild muscle loss around temples, decreased muscle tone to calves likely due to inactivity and mild to moderate fat loss around orbital region, cheeks look sunken in however this is likely due to lack of dentures

## 2018-03-13 NOTE — DIETITIAN INITIAL EVALUATION ADULT. - FACTORS AFF FOOD INTAKE
Pt reports in house she has been eating well but states she would eat even better if she were receiving soft solid foods instead of chopped foods; reports she does not have her dentures and has not worn them in a long time and is able to eat most foods without them; reports she does have some coughing after eating but only if she is not in the right position-reports she is not interested in any swallow evaluations; +constipation-on bowel regimen, would like prunes

## 2018-03-13 NOTE — DIETITIAN INITIAL EVALUATION ADULT. - ENERGY NEEDS
ht: 4'9", wt: 100pounds, BMI: 21.6kg/m2, IBW:94 pounds +/- 10%    Pt admitted c weakness, emotional distress, Pt's spouse (who would be her primary care taker at home) currently also admitted in the same room; Case Management working closely c Pt, spouse and team for disposition planning.

## 2018-03-13 NOTE — DIETITIAN INITIAL EVALUATION ADULT. - PROBLEM SELECTOR PLAN 4
-c/w plavix  -not on asa  -holding statin as pt has documented allergy to statins, though was d/c on simvastatin in september

## 2018-03-13 NOTE — DIETITIAN INITIAL EVALUATION ADULT. - ADHERENCE
noted per previous RD note from March 2017 Pt c A1C of 5.5%, Pt states she has been on Januvia but even when she was on the medication at home her doctors did not ask her to check Finger sticks; overall Pt seems to be a small eater and given age strict control would not be beneficial

## 2018-03-13 NOTE — DIETITIAN INITIAL EVALUATION ADULT. - OTHER INFO
Pt seen for LOS on [address]i. Pt reports her most recent wt at the rehab center was 100 pounds, noted admit wt of 115.3 pounds- done on bed scale, possibly c [employer] machinery. Noted per previous RD note from March 2017 Pt had lost wt and was down to about 89 pounds. Pt reports NKFA. States she dislikes bananas and does not like supplements such as Ensure or similar products. Pt agreeable to diet change from mechanical soft to soft and addition of nutrient dense snacks such as puddings.

## 2018-03-14 VITALS
SYSTOLIC BLOOD PRESSURE: 102 MMHG | HEART RATE: 64 BPM | OXYGEN SATURATION: 97 % | RESPIRATION RATE: 16 BRPM | DIASTOLIC BLOOD PRESSURE: 66 MMHG | TEMPERATURE: 98 F

## 2018-03-14 PROCEDURE — 99239 HOSP IP/OBS DSCHRG MGMT >30: CPT

## 2018-03-14 RX ADMIN — Medication 1 APPLICATION(S): at 06:01

## 2018-03-14 RX ADMIN — CLOPIDOGREL BISULFATE 75 MILLIGRAM(S): 75 TABLET, FILM COATED ORAL at 11:54

## 2018-03-14 RX ADMIN — Medication 1 APPLICATION(S): at 11:54

## 2018-03-14 RX ADMIN — HEPARIN SODIUM 5000 UNIT(S): 5000 INJECTION INTRAVENOUS; SUBCUTANEOUS at 06:01

## 2018-03-14 RX ADMIN — LOSARTAN POTASSIUM 100 MILLIGRAM(S): 100 TABLET, FILM COATED ORAL at 06:01

## 2018-03-14 NOTE — PROGRESS NOTE ADULT - PROBLEM SELECTOR PROBLEM 4
Basal cell carcinoma

## 2018-03-14 NOTE — PROGRESS NOTE ADULT - SUBJECTIVE AND OBJECTIVE BOX
Patient is a 87y old  Female who presents with a chief complaint of emotional distress/unable to care for herself at home (05 Mar 2018 16:34)      SUBJECTIVE / OVERNIGHT EVENTS:  no acute events overnight. vss, afebrile  Pt has no complaints, likes the hospital care that she is currently receiving    ROS:  14 point ROS negative in detail except stated as above    MEDICATIONS  (STANDING):  BACItracin   Ointment 1 Application(s) Topical daily  BACItracin   Ointment 1 Application(s) Topical every 6 hours  bisacodyl 5 milliGRAM(s) Oral once  clopidogrel Tablet 75 milliGRAM(s) Oral daily  dextrose 50% Injectable 12.5 Gram(s) IV Push once  dextrose 50% Injectable 25 Gram(s) IV Push once  docusate sodium Liquid 100 milliGRAM(s) Oral two times a day  heparin  Injectable 5000 Unit(s) SubCutaneous every 12 hours  insulin lispro (HumaLOG) corrective regimen sliding scale   SubCutaneous three times a day before meals  insulin lispro (HumaLOG) corrective regimen sliding scale   SubCutaneous at bedtime  losartan 100 milliGRAM(s) Oral daily  polyethylene glycol 3350 17 Gram(s) Oral once  polyethylene glycol 3350 17 Gram(s) Oral once  senna 2 Tablet(s) Oral at bedtime    MEDICATIONS  (PRN):  acetaminophen   Tablet. 650 milliGRAM(s) Oral every 6 hours PRN Mild Pain (1 - 3)      CAPILLARY BLOOD GLUCOSE        I&O's Summary    10 Mar 2018 06:01  -  11 Mar 2018 07:00  --------------------------------------------------------  IN: 820 mL / OUT: 0 mL / NET: 820 mL        PHYSICAL EXAM:  Vital Signs Last 24 Hrs  T(C): 36.6 (11 Mar 2018 05:27), Max: 37.3 (10 Mar 2018 14:16)  T(F): 97.9 (11 Mar 2018 05:27), Max: 99.1 (10 Mar 2018 14:16)  HR: 68 (11 Mar 2018 05:27) (68 - 89)  BP: 137/79 (11 Mar 2018 05:27) (120/60 - 137/79)  BP(mean): --  RR: 16 (11 Mar 2018 05:27) (16 - 16)  SpO2: 94% (11 Mar 2018 05:27) (94% - 94%)    GENERAL: NAD, well-developed  HEAD:  Atraumatic, Normocephalic  EYES: EOMI, PERRLA, conjunctiva and sclera clear  NECK: Supple, No JVD  CHEST/LUNG: Clear to auscultation bilaterally; No wheeze  HEART: Regular rate and rhythm; No murmurs, rubs, or gallops  ABDOMEN: Soft, Nontender, Nondistended; Bowel sounds present  EXTREMITIES:  2+ Peripheral Pulses, No clubbing, cyanosis, or edema  NEUROLOGY: aaox2; decreased strength LLE 3/5 compared to RLE (chronic per pt)  SKIN: bcc on right cheek - no bleeding, drainage    LABS:  no new labs      RADIOLOGY & ADDITIONAL TESTS:    Imaging Personally Reviewed:    Consultant(s) Notes Reviewed:      Care Discussed with Consultants/Other Providers:
Patient is a 87y old  Female who presents with a chief complaint of emotional distress/unable to care for herself at home (02 Mar 2018 00:17)        SUBJECTIVE / OVERNIGHT EVENTS:  Overnight, no acute events. Pt asking for cream for her rash on her face related to hx of BCC.  Denies other complaints. Denies cp, sob, n/v, f, chills, abd pain, dysuria, diarrhea.     CAPILLARY BLOOD GLUCOSE    I&O's Summary    01 Mar 2018 07:01  -  02 Mar 2018 07:00  --------------------------------------------------------  IN: 120 mL / OUT: 0 mL / NET: 120 mL    02 Mar 2018 07:01  -  02 Mar 2018 15:58  --------------------------------------------------------  IN: 540 mL / OUT: 0 mL / NET: 540 mL      Vital Signs Last 24 Hrs  T(C): 37.6 (02 Mar 2018 13:31), Max: 37.6 (02 Mar 2018 13:31)  T(F): 99.7 (02 Mar 2018 13:31), Max: 99.7 (02 Mar 2018 13:31)  HR: 80 (02 Mar 2018 13:31) (76 - 100)  BP: 138/65 (02 Mar 2018 13:31) (134/79 - 176/73)  BP(mean): --  RR: 16 (02 Mar 2018 13:31) (16 - 18)  SpO2: 94% (02 Mar 2018 13:31) (93% - 100%)    PHYSICAL EXAM:  GENERAL:  Well appearing, elderly F, in NAD  ORAL: without dentition  HEAD:  NCAT  EYES: PERRLA, conjunctiva clear  NECK: Supple, No JVD  CHEST/LUNG: CTA B/L. No w/r/r.  HEART: Reg rate. Normal S1, S2. No m/r/g.   ABDOMEN: SNTND. Bowel sounds present  EXTREMITIES:  2+ Peripheral Pulses, No clubbing, cyanosis, edema.  PSYCH: Alert, anxious appropriate affect  NEUROLOGY: decr strength LLE 3/5 compared to RLE (chronic per pt)  SKIN: BCC of r face    LABS:    RADIOLOGY & ADDITIONAL TESTS:  Care Discussed with Consultants/Other Providers: Floor NP,     MEDICATIONS  (STANDING):  BACItracin   Ointment 1 Application(s) Topical daily  clopidogrel Tablet 75 milliGRAM(s) Oral daily  dextrose 50% Injectable 12.5 Gram(s) IV Push once  dextrose 50% Injectable 25 Gram(s) IV Push once  heparin  Injectable 5000 Unit(s) SubCutaneous every 8 hours  insulin lispro (HumaLOG) corrective regimen sliding scale   SubCutaneous three times a day before meals  insulin lispro (HumaLOG) corrective regimen sliding scale   SubCutaneous at bedtime  losartan 100 milliGRAM(s) Oral daily    MEDICATIONS  (PRN):  acetaminophen   Tablet. 650 milliGRAM(s) Oral every 6 hours PRN Mild Pain (1 - 3)
Patient is a 87y old  Female who presents with a chief complaint of emotional distress/unable to care for herself at home (02 Mar 2018 00:17)        SUBJECTIVE / OVERNIGHT EVENTS: Patient seen and examined.  Feels well this AM.  Facial pain well controlled.  Not constipated.     REVIEW OF SYSTEMS      General: No fevers, chills  	  Ophthalmologic: No change in vision    Respiratory and Thorax: No SOB or cough  	  Cardiovascular: No chest pain, palpitations, or LE edema    Gastrointestinal: No abdominal pain, nausea, vomiting, or diarrhea    Genitourinary: No dysuria or polyuria      MEDICATIONS  (STANDING):  BACItracin   Ointment 1 Application(s) Topical daily  bisacodyl 5 milliGRAM(s) Oral once  clopidogrel Tablet 75 milliGRAM(s) Oral daily  dextrose 50% Injectable 12.5 Gram(s) IV Push once  dextrose 50% Injectable 25 Gram(s) IV Push once  docusate sodium Liquid 100 milliGRAM(s) Oral two times a day  heparin  Injectable 5000 Unit(s) SubCutaneous every 12 hours  insulin lispro (HumaLOG) corrective regimen sliding scale   SubCutaneous three times a day before meals  insulin lispro (HumaLOG) corrective regimen sliding scale   SubCutaneous at bedtime  losartan 100 milliGRAM(s) Oral daily  polyethylene glycol 3350 17 Gram(s) Oral once  polyethylene glycol 3350 17 Gram(s) Oral once  senna 2 Tablet(s) Oral at bedtime    MEDICATIONS  (PRN):  acetaminophen   Tablet. 650 milliGRAM(s) Oral every 6 hours PRN Mild Pain (1 - 3)      I&O's Summary    08 Mar 2018 07:01  -  09 Mar 2018 07:00  --------------------------------------------------------  IN: 970 mL / OUT: 0 mL / NET: 970 mL        CAPILLARY BLOOD GLUCOSE - patient refusing F/S      Vital Signs Last 24 Hrs  T(C): 37.4 (09 Mar 2018 10:21), Max: 37.4 (09 Mar 2018 10:21)  T(F): 99.3 (09 Mar 2018 10:21), Max: 99.3 (09 Mar 2018 10:21)  HR: 83 (09 Mar 2018 10:21) (68 - 83)  BP: 121/71 (09 Mar 2018 10:21) (95/63 - 136/66)  BP(mean): --  RR: 18 (09 Mar 2018 10:21) (18 - 18)  SpO2: 93% (09 Mar 2018 10:21) (93% - 96%)    PHYSICAL EXAM:    GENERAL:  Well appearing, cachectic, elderly F, in NAD  ORAL: without dentition  HEAD:  NCAT  EYES: PERRLA, conjunctiva clear  NECK: Supple, No JVD  CHEST/LUNG: CTA B/L. No w/r/r.  HEART: Reg rate. Normal S1, S2. No m/r/g.   ABDOMEN: SNTND. Bowel sounds present  EXTREMITIES:  2+ Peripheral Pulses, No clubbing, cyanosis, edema.  PSYCH: Alert, anxious   NEUROLOGY: decr strength LLE 3/5 compared to RLE (chronic per pt)  SKIN: BCC of R cheek (no change in exam)      LABS: No Labs today      RADIOLOGY & ADDITIONAL TESTS:    Imaging Personally Reviewed:  Consultant(s) Notes Reviewed:    Care Discussed with Consultants/Other Providers: [x]
Patient is a 87y old  Female who presents with a chief complaint of emotional distress/unable to care for herself at home (02 Mar 2018 00:17)        SUBJECTIVE / OVERNIGHT EVENTS: Patient seen and examined.  Patient reports she had a BM yesterday.  Has some pain of her R cheek at site of BCC that is intermittent.     REVIEW OF SYSTEMS      General: No fevers, chills  	  Ophthalmologic: No change in vision    Respiratory and Thorax: No SOB or cough  	  Cardiovascular: No chest pain, palpitations, or LE edema    Gastrointestinal: No abdominal pain, nausea, vomiting, or diarrhea    Genitourinary: No dysuria or polyuria    MEDICATIONS  (STANDING):  BACItracin   Ointment 1 Application(s) Topical daily  bisacodyl 5 milliGRAM(s) Oral once  clopidogrel Tablet 75 milliGRAM(s) Oral daily  dextrose 50% Injectable 12.5 Gram(s) IV Push once  dextrose 50% Injectable 25 Gram(s) IV Push once  docusate sodium Liquid 100 milliGRAM(s) Oral two times a day  heparin  Injectable 5000 Unit(s) SubCutaneous every 12 hours  insulin lispro (HumaLOG) corrective regimen sliding scale   SubCutaneous three times a day before meals  insulin lispro (HumaLOG) corrective regimen sliding scale   SubCutaneous at bedtime  losartan 100 milliGRAM(s) Oral daily  polyethylene glycol 3350 17 Gram(s) Oral once  polyethylene glycol 3350 17 Gram(s) Oral once  senna 2 Tablet(s) Oral at bedtime    MEDICATIONS  (PRN):  acetaminophen   Tablet. 650 milliGRAM(s) Oral every 6 hours PRN Mild Pain (1 - 3)      I&O's Summary    07 Mar 2018 07:01  -  08 Mar 2018 07:00  --------------------------------------------------------  IN: 1380 mL / OUT: 0 mL / NET: 1380 mL    08 Mar 2018 07:01  -  08 Mar 2018 14:23  --------------------------------------------------------  IN: 490 mL / OUT: 0 mL / NET: 490 mL        CAPILLARY BLOOD GLUCOSE - patient refuses    Vital Signs Last 24 Hrs  T(C): 37 (08 Mar 2018 11:07), Max: 37.1 (07 Mar 2018 19:32)  T(F): 98.6 (08 Mar 2018 11:07), Max: 98.7 (07 Mar 2018 19:32)  HR: 79 (08 Mar 2018 11:07) (68 - 80)  BP: 136/74 (08 Mar 2018 11:07) (129/62 - 163/86)  BP(mean): --  RR: 18 (08 Mar 2018 11:07) (18 - 18)  SpO2: 93% (08 Mar 2018 11:07) (93% - 95%)    PHYSICAL EXAM:    GENERAL:  Well appearing, cachectic, elderly F, in NAD  ORAL: without dentition  HEAD:  NCAT  EYES: PERRLA, conjunctiva clear  NECK: Supple, No JVD  CHEST/LUNG: CTA B/L. No w/r/r.  HEART: Reg rate. Normal S1, S2. No m/r/g.   ABDOMEN: SNTND. Bowel sounds present  EXTREMITIES:  2+ Peripheral Pulses, No clubbing, cyanosis, edema.  PSYCH: Alert, anxious   NEUROLOGY: decr strength LLE 3/5 compared to RLE (chronic per pt)  SKIN: BCC of R cheek (no change in exam)      LABS: No Labs today      RADIOLOGY & ADDITIONAL TESTS:    Imaging Personally Reviewed:  Consultant(s) Notes Reviewed:    Care Discussed with Consultants/Other Providers: [x]
Patient is a 87y old  Female who presents with a chief complaint of emotional distress/unable to care for herself at home (02 Mar 2018 00:17)        SUBJECTIVE / OVERNIGHT EVENTS: Patient seen and examined.  She feels well, although reports some sadness due to her hospitalization.  Only complaint is mild constipation (last BM 3 days ago).  ROS otherwise unremarkable.     MEDICATIONS  (STANDING):  amLODIPine   Tablet 5 milliGRAM(s) Oral daily  BACItracin   Ointment 1 Application(s) Topical daily  clopidogrel Tablet 75 milliGRAM(s) Oral daily  dextrose 50% Injectable 12.5 Gram(s) IV Push once  dextrose 50% Injectable 25 Gram(s) IV Push once  docusate sodium Liquid 100 milliGRAM(s) Oral two times a day  heparin  Injectable 5000 Unit(s) SubCutaneous every 12 hours  insulin lispro (HumaLOG) corrective regimen sliding scale   SubCutaneous three times a day before meals  insulin lispro (HumaLOG) corrective regimen sliding scale   SubCutaneous at bedtime  losartan 100 milliGRAM(s) Oral daily    MEDICATIONS  (PRN):  acetaminophen   Tablet. 650 milliGRAM(s) Oral every 6 hours PRN Mild Pain (1 - 3)      REVIEW OF SYSTEMS      General: No fevers, chills  	  Ophthalmologic: No change in vision    Respiratory and Thorax: No SOB or cough  	  Cardiovascular: No chest pain, palpitations, or LE edema    Gastrointestinal: No abdominal pain, nausea, vomiting, or diarrhea    Genitourinary: No dysuria or polyuria    	    CAPILLARY BLOOD GLUCOSE - Patient refusing F/S        I&O's Summary    04 Mar 2018 07:01  -  05 Mar 2018 07:00  --------------------------------------------------------  IN: 880 mL / OUT: 0 mL / NET: 880 mL    05 Mar 2018 07:01  -  05 Mar 2018 13:51  --------------------------------------------------------  IN: 420 mL / OUT: 0 mL / NET: 420 mL      Vital Signs Last 24 Hrs  T(C): 36.3 (05 Mar 2018 07:53), Max: 36.7 (04 Mar 2018 19:18)  T(F): 97.4 (05 Mar 2018 07:53), Max: 98 (04 Mar 2018 19:18)  HR: 80 (05 Mar 2018 07:53) (65 - 80)  BP: 140/81 (05 Mar 2018 07:53) (101/67 - 140/81)  BP(mean): --  RR: 17 (05 Mar 2018 07:53) (17 - 18)  SpO2: 95% (05 Mar 2018 07:53) (94% - 96%)    PHYSICAL EXAM:    GENERAL:  Well appearing, cachectic, elderly F, in NAD  ORAL: without dentition  HEAD:  NCAT  EYES: PERRLA, conjunctiva clear  NECK: Supple, No JVD  CHEST/LUNG: CTA B/L. No w/r/r.  HEART: Reg rate. Normal S1, S2. No m/r/g.   ABDOMEN: SNTND. Bowel sounds present  EXTREMITIES:  2+ Peripheral Pulses, No clubbing, cyanosis, edema.  PSYCH: Alert, anxious   NEUROLOGY: decr strength LLE 3/5 compared to RLE (chronic per pt)  SKIN: BCC of r face      LABS: No Labs today      RADIOLOGY & ADDITIONAL TESTS:    Imaging Personally Reviewed:  Consultant(s) Notes Reviewed:    Care Discussed with Consultants/Other Providers: [x]
Patient is a 87y old  Female who presents with a chief complaint of emotional distress/unable to care for herself at home (02 Mar 2018 00:17)        SUBJECTIVE / OVERNIGHT EVENTS: Patient seen and examined.  She feels well, although still no BM since yesterday and somewhat constipated.     REVIEW OF SYSTEMS      General: No fevers, chills  	  Ophthalmologic: No change in vision    Respiratory and Thorax: No SOB or cough  	  Cardiovascular: No chest pain, palpitations, or LE edema    Gastrointestinal: No abdominal pain, nausea, vomiting, or diarrhea    Genitourinary: No dysuria or polyuria    	  MEDICATIONS  (STANDING):  amLODIPine   Tablet 5 milliGRAM(s) Oral daily  BACItracin   Ointment 1 Application(s) Topical daily  clopidogrel Tablet 75 milliGRAM(s) Oral daily  dextrose 50% Injectable 12.5 Gram(s) IV Push once  dextrose 50% Injectable 25 Gram(s) IV Push once  docusate sodium Liquid 100 milliGRAM(s) Oral two times a day  heparin  Injectable 5000 Unit(s) SubCutaneous every 12 hours  insulin lispro (HumaLOG) corrective regimen sliding scale   SubCutaneous three times a day before meals  insulin lispro (HumaLOG) corrective regimen sliding scale   SubCutaneous at bedtime  losartan 100 milliGRAM(s) Oral daily  senna 2 Tablet(s) Oral at bedtime    MEDICATIONS  (PRN):  acetaminophen   Tablet. 650 milliGRAM(s) Oral every 6 hours PRN Mild Pain (1 - 3)      I&O's Summary    05 Mar 2018 07:01  -  06 Mar 2018 07:00  --------------------------------------------------------  IN: 900 mL / OUT: 0 mL / NET: 900 mL    06 Mar 2018 07:01  -  06 Mar 2018 14:01  --------------------------------------------------------  IN: 440 mL / OUT: 0 mL / NET: 440 mL        CAPILLARY BLOOD GLUCOSE - patient refusing          Vital Signs Last 24 Hrs  T(C): 36.1 (06 Mar 2018 11:25), Max: 37 (05 Mar 2018 18:49)  T(F): 97 (06 Mar 2018 11:25), Max: 98.6 (05 Mar 2018 18:49)  HR: 76 (06 Mar 2018 11:25) (76 - 89)  BP: 111/77 (06 Mar 2018 11:25) (102/52 - 158/84)  BP(mean): --  RR: 18 (06 Mar 2018 11:25) (16 - 18)  SpO2: 95% (06 Mar 2018 11:25) (94% - 98%)    PHYSICAL EXAM:    GENERAL:  Well appearing, cachectic, elderly F, in NAD  ORAL: without dentition  HEAD:  NCAT  EYES: PERRLA, conjunctiva clear  NECK: Supple, No JVD  CHEST/LUNG: CTA B/L. No w/r/r.  HEART: Reg rate. Normal S1, S2. No m/r/g.   ABDOMEN: SNTND. Bowel sounds present  EXTREMITIES:  2+ Peripheral Pulses, No clubbing, cyanosis, edema.  PSYCH: Alert, anxious   NEUROLOGY: decr strength LLE 3/5 compared to RLE (chronic per pt)  SKIN: BCC of R cheek      LABS: No Labs today      RADIOLOGY & ADDITIONAL TESTS:    Imaging Personally Reviewed:  Consultant(s) Notes Reviewed:    Care Discussed with Consultants/Other Providers: [x]
Patient is a 87y old  Female who presents with a chief complaint of emotional distress/unable to care for herself at home (02 Mar 2018 00:17)        SUBJECTIVE / OVERNIGHT EVENTS: Patient seen and examined.  She reports some "gas" this morning, but does not feel constipated despite no BM for the past few days.  Reports some pain at the site of her BCC on R side of the face; this is relieved with bacitracin.     REVIEW OF SYSTEMS      General: No fevers, chills  	  Ophthalmologic: No change in vision    Respiratory and Thorax: No SOB or cough  	  Cardiovascular: No chest pain, palpitations, or LE edema    Gastrointestinal: No abdominal pain, nausea, vomiting, or diarrhea    Genitourinary: No dysuria or polyuria    	  MEDICATIONS  (STANDING):  amLODIPine   Tablet 5 milliGRAM(s) Oral daily  BACItracin   Ointment 1 Application(s) Topical daily  bisacodyl 5 milliGRAM(s) Oral once  bisacodyl 5 milliGRAM(s) Oral once  clopidogrel Tablet 75 milliGRAM(s) Oral daily  dextrose 50% Injectable 12.5 Gram(s) IV Push once  dextrose 50% Injectable 25 Gram(s) IV Push once  docusate sodium Liquid 100 milliGRAM(s) Oral two times a day  heparin  Injectable 5000 Unit(s) SubCutaneous every 12 hours  insulin lispro (HumaLOG) corrective regimen sliding scale   SubCutaneous three times a day before meals  insulin lispro (HumaLOG) corrective regimen sliding scale   SubCutaneous at bedtime  losartan 100 milliGRAM(s) Oral daily  senna 2 Tablet(s) Oral at bedtime    MEDICATIONS  (PRN):  acetaminophen   Tablet. 650 milliGRAM(s) Oral every 6 hours PRN Mild Pain (1 - 3)      I&O's Summary    06 Mar 2018 07:01  -  07 Mar 2018 07:00  --------------------------------------------------------  IN: 920 mL / OUT: 0 mL / NET: 920 mL        CAPILLARY BLOOD GLUCOSE - Patient refusing F/S      Vital Signs Last 24 Hrs  T(C): 36.4 (07 Mar 2018 05:40), Max: 36.7 (06 Mar 2018 19:44)  T(F): 97.6 (07 Mar 2018 05:40), Max: 98.1 (06 Mar 2018 19:44)  HR: 67 (07 Mar 2018 05:40) (67 - 76)  BP: 96/60 (07 Mar 2018 05:40) (96/60 - 111/77)  BP(mean): --  RR: 16 (07 Mar 2018 05:40) (16 - 18)  SpO2: 96% (07 Mar 2018 05:40) (94% - 96%)    PHYSICAL EXAM:    GENERAL:  Well appearing, cachectic, elderly F, in NAD  ORAL: without dentition  HEAD:  NCAT  EYES: PERRLA, conjunctiva clear  NECK: Supple, No JVD  CHEST/LUNG: CTA B/L. No w/r/r.  HEART: Reg rate. Normal S1, S2. No m/r/g.   ABDOMEN: SNTND. Bowel sounds present  EXTREMITIES:  2+ Peripheral Pulses, No clubbing, cyanosis, edema.  PSYCH: Alert, anxious   NEUROLOGY: decr strength LLE 3/5 compared to RLE (chronic per pt)  SKIN: BCC of R cheek (no change in exam)      LABS: No Labs today      RADIOLOGY & ADDITIONAL TESTS:    Imaging Personally Reviewed:  Consultant(s) Notes Reviewed:    Care Discussed with Consultants/Other Providers: [x]
Patient is a 87y old  Female who presents with a chief complaint of emotional distress/unable to care for herself at home (05 Mar 2018 16:34)      SUBJECTIVE / OVERNIGHT EVENTS:  no acute events overnight. vss, afebrile  Pt reports that bacitracin ointment applied on her face helped her with pain - denies scratching the area  Pt asks for movies    ROS:  14 point ROS negative in detail except stated as above    MEDICATIONS  (STANDING):  BACItracin   Ointment 1 Application(s) Topical daily  BACItracin   Ointment 1 Application(s) Topical every 6 hours  bisacodyl 5 milliGRAM(s) Oral once  clopidogrel Tablet 75 milliGRAM(s) Oral daily  dextrose 50% Injectable 12.5 Gram(s) IV Push once  dextrose 50% Injectable 25 Gram(s) IV Push once  docusate sodium Liquid 100 milliGRAM(s) Oral two times a day  heparin  Injectable 5000 Unit(s) SubCutaneous every 12 hours  insulin lispro (HumaLOG) corrective regimen sliding scale   SubCutaneous three times a day before meals  insulin lispro (HumaLOG) corrective regimen sliding scale   SubCutaneous at bedtime  losartan 100 milliGRAM(s) Oral daily  polyethylene glycol 3350 17 Gram(s) Oral once  polyethylene glycol 3350 17 Gram(s) Oral once  senna 2 Tablet(s) Oral at bedtime    MEDICATIONS  (PRN):  acetaminophen   Tablet. 650 milliGRAM(s) Oral every 6 hours PRN Mild Pain (1 - 3)      CAPILLARY BLOOD GLUCOSE        I&O's Summary    09 Mar 2018 07:01  -  10 Mar 2018 07:00  --------------------------------------------------------  IN: 880 mL / OUT: 0 mL / NET: 880 mL        PHYSICAL EXAM:  Vital Signs Last 24 Hrs  T(C): 36.7 (10 Mar 2018 04:55), Max: 37.2 (09 Mar 2018 21:19)  T(F): 98 (10 Mar 2018 04:55), Max: 98.9 (09 Mar 2018 21:19)  HR: 74 (10 Mar 2018 04:55) (74 - 85)  BP: 136/84 (10 Mar 2018 04:55) (96/66 - 136/84)  BP(mean): --  RR: 18 (10 Mar 2018 04:55) (18 - 18)  SpO2: 95% (10 Mar 2018 04:55) (95% - 96%)    GENERAL: NAD, well-developed  HEAD:  Atraumatic, Normocephalic  EYES: EOMI, PERRLA, conjunctiva and sclera clear  NECK: Supple, No JVD  CHEST/LUNG: Clear to auscultation bilaterally; No wheeze  HEART: Regular rate and rhythm; No murmurs, rubs, or gallops  ABDOMEN: Soft, Nontender, Nondistended; Bowel sounds present  EXTREMITIES:  2+ Peripheral Pulses, No clubbing, cyanosis, or edema  NEUROLOGY: aaox2; decreased strength LLE 3/5 compared to RLE (chronic per pt)  SKIN: bcc on right cheek - no bleeding, drainage    LABS:  no new labs      RADIOLOGY & ADDITIONAL TESTS:    Imaging Personally Reviewed:    Consultant(s) Notes Reviewed:      Care Discussed with Consultants/Other Providers:
ROGELIO HUMPHREYS  87y  Female      Patient is a 87y old  Female who presents with a chief complaint of emotional distress/unable to care for herself at home (05 Mar 2018 16:34)      INTERVAL HPI/OVERNIGHT EVENTS:  No overnight events. no complaints.       T(C): 36.6 (03-14-18 @ 11:01), Max: 37.3 (03-13-18 @ 13:15)  HR: 86 (03-14-18 @ 11:01) (71 - 96)  BP: 128/62 (03-14-18 @ 11:01) (95/60 - 149/80)  RR: 17 (03-14-18 @ 11:01) (17 - 18)  SpO2: 94% (03-14-18 @ 11:01) (93% - 96%)  Wt(kg): --Vital Signs Last 24 Hrs  T(C): 36.6 (14 Mar 2018 11:01), Max: 37.3 (13 Mar 2018 13:15)  T(F): 97.8 (14 Mar 2018 11:01), Max: 99.2 (13 Mar 2018 13:15)  HR: 86 (14 Mar 2018 11:01) (71 - 96)  BP: 128/62 (14 Mar 2018 11:01) (95/60 - 149/80)  BP(mean): --  RR: 17 (14 Mar 2018 11:01) (17 - 18)  SpO2: 94% (14 Mar 2018 11:01) (93% - 96%)    PHYSICAL EXAM:  GENERAL: NAD, well-developed  HEAD:  Atraumatic, Normocephalic  EYES: EOMI, PERRLA, conjunctiva and sclera clear  NECK: Supple, No JVD  CHEST/LUNG: Clear to auscultation bilaterally; No wheeze  HEART: Regular rate and rhythm; No murmurs, rubs, or gallops  ABDOMEN: Soft, Nontender, Nondistended; Bowel sounds present  EXTREMITIES:  2+ Peripheral Pulses, No clubbing, cyanosis, or edema  NEUROLOGY: aaox2; decreased strength LLE 3/5 compared to RLE (chronic per pt)  SKIN: bcc on right cheek - no bleeding, drainage    Consultant(s) Notes Reviewed:  [x ] YES  [ ] NO  Care Discussed with Consultants/Other Providers [ x] YES  [ ] NO    LABS:    No new labs          CAPILLARY BLOOD GLUCOSE                RADIOLOGY & ADDITIONAL TESTS:    Imaging Personally Reviewed:  [ ] YES  [ ] NO
ROGELIO HUMPHREYS  87y  Female      Patient is a 87y old  Female who presents with a chief complaint of emotional distress/unable to care for herself at home (05 Mar 2018 16:34)      INTERVAL HPI/OVERNIGHT EVENTS:  Seen at bedside. No overnight events. No complaints this am.       REVIEW OF SYSTEMS: 14 point ROS negative    T(C): 36.9 (03-13-18 @ 06:24), Max: 37.3 (03-12-18 @ 13:26)  HR: 76 (03-13-18 @ 06:24) (76 - 82)  BP: 137/62 (03-13-18 @ 06:24) (125/75 - 144/84)  RR: 16 (03-13-18 @ 06:24) (16 - 16)  SpO2: 94% (03-13-18 @ 06:24) (94% - 95%)  Wt(kg): --Vital Signs Last 24 Hrs  T(C): 36.9 (13 Mar 2018 06:24), Max: 37.3 (12 Mar 2018 13:26)  T(F): 98.4 (13 Mar 2018 06:24), Max: 99.2 (12 Mar 2018 13:26)  HR: 76 (13 Mar 2018 06:24) (76 - 82)  BP: 137/62 (13 Mar 2018 06:24) (125/75 - 144/84)  BP(mean): --  RR: 16 (13 Mar 2018 06:24) (16 - 16)  SpO2: 94% (13 Mar 2018 06:24) (94% - 95%)    PHYSICAL EXAM:  GENERAL: NAD, well-developed  HEAD:  Atraumatic, Normocephalic  EYES: EOMI, PERRLA, conjunctiva and sclera clear  NECK: Supple, No JVD  CHEST/LUNG: Clear to auscultation bilaterally; No wheeze  HEART: Regular rate and rhythm; No murmurs, rubs, or gallops  ABDOMEN: Soft, Nontender, Nondistended; Bowel sounds present  EXTREMITIES:  2+ Peripheral Pulses, No clubbing, cyanosis, or edema  NEUROLOGY: aaox2; decreased strength LLE 3/5 compared to RLE (chronic per pt)  SKIN: bcc on right cheek - no bleeding, drainage    Consultant(s) Notes Reviewed:  [x ] YES  [ ] NO  Care Discussed with Consultants/Other Providers [ x] YES  [ ] NO    LABS:  No new labs            CAPILLARY BLOOD GLUCOSE                RADIOLOGY & ADDITIONAL TESTS:    Imaging Personally Reviewed:  [ ] YES  [ ] NO
Patient is a 87y old  Female who presents with a chief complaint of emotional distress/unable to care for herself at home (02 Mar 2018 00:17)        SUBJECTIVE / OVERNIGHT EVENTS:  Overnight, no acute events.  Pt seen at bedside, denies any complaints.  Refusing to have labs drawn - but after discussion agreeable to have labs done once.  Denies any cp, sob, n/v, abd pain, f/chills.    CAPILLARY BLOOD GLUCOSE    I&O's Summary    02 Mar 2018 07:01  -  03 Mar 2018 07:00  --------------------------------------------------------  IN: 1100 mL / OUT: 0 mL / NET: 1100 mL    03 Mar 2018 07:01  -  03 Mar 2018 15:56  --------------------------------------------------------  IN: 280 mL / OUT: 0 mL / NET: 280 mL    Vital Signs Last 24 Hrs  T(C): 37 (03 Mar 2018 14:17), Max: 37.3 (02 Mar 2018 21:50)  T(F): 98.6 (03 Mar 2018 14:17), Max: 99.2 (02 Mar 2018 21:50)  HR: 75 (03 Mar 2018 14:17) (75 - 80)  BP: 116/62 (03 Mar 2018 14:17) (116/62 - 175/96)  BP(mean): --  RR: 16 (03 Mar 2018 14:17) (16 - 16)  SpO2: 93% (03 Mar 2018 14:17) (93% - 95%)    PHYSICAL EXAM:  GENERAL:  Well appearing, cachectic, elderly F, in NAD  ORAL: without dentition  HEAD:  NCAT  EYES: PERRLA, conjunctiva clear  NECK: Supple, No JVD  CHEST/LUNG: CTA B/L. No w/r/r.  HEART: Reg rate. Normal S1, S2. No m/r/g.   ABDOMEN: SNTND. Bowel sounds present  EXTREMITIES:  2+ Peripheral Pulses, No clubbing, cyanosis, edema.  PSYCH: Alert, anxious appropriate affect  NEUROLOGY: decr strength LLE 3/5 compared to RLE (chronic per pt)  SKIN: BCC of r face    LABS:    RADIOLOGY & ADDITIONAL TESTS:  Consultant(s) Notes Reviewed:  PT  Care Discussed with Consultants/Other Providers: Floor NP    MEDICATIONS  (STANDING):  amLODIPine   Tablet 5 milliGRAM(s) Oral daily  BACItracin   Ointment 1 Application(s) Topical daily  clopidogrel Tablet 75 milliGRAM(s) Oral daily  dextrose 50% Injectable 12.5 Gram(s) IV Push once  dextrose 50% Injectable 25 Gram(s) IV Push once  heparin  Injectable 5000 Unit(s) SubCutaneous every 12 hours  insulin lispro (HumaLOG) corrective regimen sliding scale   SubCutaneous three times a day before meals  insulin lispro (HumaLOG) corrective regimen sliding scale   SubCutaneous at bedtime  losartan 100 milliGRAM(s) Oral daily    MEDICATIONS  (PRN):  acetaminophen   Tablet. 650 milliGRAM(s) Oral every 6 hours PRN Mild Pain (1 - 3)
ROGELIO HUMPHREYS  87y  Female      Patient is a 87y old  Female who presents with a chief complaint of emotional distress/unable to care for herself at home (05 Mar 2018 16:34)      INTERVAL HPI/OVERNIGHT EVENTS:  No overnight events. Refusing FS. Upset that no one has cleaned up her try this am.       REVIEW OF SYSTEMS: 14 point ROS negative except where listed above    T(C): 36.8 (03-12-18 @ 06:12), Max: 37.2 (03-11-18 @ 13:19)  HR: 75 (03-12-18 @ 06:12) (74 - 80)  BP: 132/61 (03-12-18 @ 06:12) (124/76 - 132/61)  RR: 18 (03-12-18 @ 06:12) (18 - 18)  SpO2: 96% (03-12-18 @ 06:12) (94% - 96%)  Wt(kg): --Vital Signs Last 24 Hrs  T(C): 36.8 (12 Mar 2018 06:12), Max: 37.2 (11 Mar 2018 13:19)  T(F): 98.2 (12 Mar 2018 06:12), Max: 99 (11 Mar 2018 13:19)  HR: 75 (12 Mar 2018 06:12) (74 - 80)  BP: 132/61 (12 Mar 2018 06:12) (124/76 - 132/61)  BP(mean): --  RR: 18 (12 Mar 2018 06:12) (18 - 18)  SpO2: 96% (12 Mar 2018 06:12) (94% - 96%)    PHYSICAL EXAM:  GENERAL: NAD, well-developed  HEAD:  Atraumatic, Normocephalic  EYES: EOMI, PERRLA, conjunctiva and sclera clear  NECK: Supple, No JVD  CHEST/LUNG: Clear to auscultation bilaterally; No wheeze  HEART: Regular rate and rhythm; No murmurs, rubs, or gallops  ABDOMEN: Soft, Nontender, Nondistended; Bowel sounds present  EXTREMITIES:  2+ Peripheral Pulses, No clubbing, cyanosis, or edema  NEUROLOGY: aaox2; decreased strength LLE 3/5 compared to RLE (chronic per pt)  SKIN: bcc on right cheek - no bleeding, drainage    Consultant(s) Notes Reviewed:  [x ] YES  [ ] NO  Care Discussed with Consultants/Other Providers [ x] YES  [ ] NO    LABS:              CAPILLARY BLOOD GLUCOSE                RADIOLOGY & ADDITIONAL TESTS:    Imaging Personally Reviewed:  [ ] YES  [ ] NO
Patient is a 87y old  Female who presents with a chief complaint of emotional distress/unable to care for herself at home (02 Mar 2018 00:17)        SUBJECTIVE / OVERNIGHT EVENTS:  Overnight, no acute events. Denies any complaints. Denies pain, n/v, cp, sob, abd pain, f/chills.    CAPILLARY BLOOD GLUCOSE    I&O's Summary    03 Mar 2018 07:01  -  04 Mar 2018 07:00  --------------------------------------------------------  IN: 720 mL / OUT: 0 mL / NET: 720 mL    Vital Signs Last 24 Hrs  T(C): 36.7 (04 Mar 2018 11:32), Max: 37.2 (03 Mar 2018 21:33)  T(F): 98.1 (04 Mar 2018 11:32), Max: 99 (03 Mar 2018 21:33)  HR: 79 (04 Mar 2018 11:32) (75 - 88)  BP: 124/71 (04 Mar 2018 11:32) (103/70 - 140/76)  BP(mean): --  RR: 16 (04 Mar 2018 11:32) (16 - 18)  SpO2: 96% (04 Mar 2018 11:32) (93% - 96%)    PHYSICAL EXAM:  GENERAL:  Well appearing, cachectic, elderly F, in NAD  ORAL: without dentition  HEAD:  NCAT  EYES: PERRLA, conjunctiva clear  NECK: Supple, No JVD  CHEST/LUNG: CTA B/L. No w/r/r.  HEART: Reg rate. Normal S1, S2. No m/r/g.   ABDOMEN: SNTND. Bowel sounds present  EXTREMITIES:  2+ Peripheral Pulses, No clubbing, cyanosis, edema.  PSYCH: Alert, anxious   NEUROLOGY: decr strength LLE 3/5 compared to RLE (chronic per pt)  SKIN: BCC of r face    LABS:    RADIOLOGY & ADDITIONAL TESTS:  Care Discussed with Consultants/Other Providers: Floor NP    MEDICATIONS  (STANDING):  amLODIPine   Tablet 5 milliGRAM(s) Oral daily  BACItracin   Ointment 1 Application(s) Topical daily  clopidogrel Tablet 75 milliGRAM(s) Oral daily  dextrose 50% Injectable 12.5 Gram(s) IV Push once  dextrose 50% Injectable 25 Gram(s) IV Push once  heparin  Injectable 5000 Unit(s) SubCutaneous every 12 hours  insulin lispro (HumaLOG) corrective regimen sliding scale   SubCutaneous three times a day before meals  insulin lispro (HumaLOG) corrective regimen sliding scale   SubCutaneous at bedtime  losartan 100 milliGRAM(s) Oral daily    MEDICATIONS  (PRN):  acetaminophen   Tablet. 650 milliGRAM(s) Oral every 6 hours PRN Mild Pain (1 - 3)

## 2018-03-14 NOTE — PROGRESS NOTE ADULT - ASSESSMENT
88 y/o F PMH of CVA (7/2015 with residual R sided weakness/pain), T2DM not on insulin, HTN, BCC, recent d/c from [facility] presents to [facility] for inability to care for self pending LTCF placement.
86 y/o F PMH of CVA (7/2015 with residual R sided weakness/pain), T2DM not on insulin, HTN, BCC, recent d/c from [facility] presents to [facility] for inability to care for self pending LTCF placement.
86 y/o F PMH of CVA (7/2015 with residual R sided weakness/pain), T2DM not on insulin, HTN, BCC, recent d/c from [facility] presents to [facility] for inability to care for self pending placement in LTCF.
86 y/o F PMH of CVA (7/2015 with residual R sided weakness/pain), T2DM not on insulin, HTN, BCC, recent d/c from [facility] presents to [facility] for inability to care for self pending LTCF placement.
88 y/o F PMH of CVA (7/2015 with residual R sided weakness/pain), T2DM not on insulin, HTN, BCC, recent d/c from [facility] presents to [facility] for inability to care for self pending LTCF placement.
88 y/o F PMH of CVA (7/2015 with residual R sided weakness/pain), T2DM not on insulin, HTN, BCC, recent d/c from [facility] presents to [facility] for inability to care for self pending LTCF placement.
88 y/o F PMH of CVA (7/2015 with residual R sided weakness/pain), T2DM not on insulin, HTN, BCC, recent d/c from [facility] presents to [facility] for inability to care for self pending safe discharge.
88 y/o F PMH of CVA (7/2015 with residual R sided weakness/pain), T2DM not on insulin, HTN, BCC, recent d/c from [facility] presents to [facility] for inability to care for self pending LTCF placement.
88 y/o F PMH of CVA (7/2015 with residual R sided weakness/pain), T2DM not on insulin, HTN, BCC, recent d/c from [facility] presents to [facility] for inability to care for self pending placement in LTCF.
88 y/o F PMH of CVA (7/2015 with residual R sided weakness/pain), T2DM not on insulin, HTN, BCC, recent d/c from [facility] presents to [facility] for inability to care for self pending LTCF placement.
86 y/o F PMH of CVA (7/2015 with residual R sided weakness/pain), T2DM not on insulin, HTN, BCC, recent d/c from [facility] presents to [facility] for inability to care for self pending placement in LTCF.
88 y/o F PMH of CVA (7/2015 with residual R sided weakness/pain), T2DM not on insulin, HTN, BCC, recent d/c from [facility] presents to [facility] for inability to care for self pending LTCF placement.
88 y/o F PMH of CVA (7/2015 with residual R sided weakness/pain), T2DM not on insulin, HTN, BCC, recent d/c from [facility] presents to [facility] for inability to care for self pending placement in LTCF.

## 2018-03-14 NOTE — PROGRESS NOTE ADULT - PROBLEM SELECTOR PLAN 4
known hx of BCC of cheek  - outpatient derm follow up  - c/w bacitracin to r cheek
known hx of BCC of cheek  - f/u derm outpt  - bacitracin to r cheek as was receiving as outpt in rehab
known hx of BCC of cheek, some pain this AM  - f/u derm outpt  - bacitracin to r cheek as was receiving as outpt in rehab
known hx of BCC of cheek  - f/u derm outpt  - bacitracin to r cheek as was receiving as outpt in rehab
known hx of BCC of cheek  - outpatient derm follow up  - c/w bacitracin to Right cheek
known hx of BCC of cheek  - outpatient derm follow up  - c/w bacitracin to Right cheek
known hx of BCC of cheek, some pain this AM  - f/u derm outpt  - bacitracin to r cheek as was receiving as outpt in rehab
known hx of BCC of cheek  - f/u derm outpt  - bacitracin to r cheek as was receiving as outpt in rehab

## 2018-03-14 NOTE — PROGRESS NOTE ADULT - PROBLEM SELECTOR PROBLEM 5
Cerebrovascular accident (CVA), unspecified mechanism

## 2018-03-14 NOTE — PROGRESS NOTE ADULT - PROBLEM SELECTOR PLAN 6
DNR/DNI  DVT PPX: HSQ - pt refusing - discussed risks  PT/OT - PT in LCTF  Dispo: pending LTCF, d/w CM
- c/w bowel regimen
DNR/DNI  DVT PPX: HSQ  PT/OT - PT in LCTF  Dispo: pending LTCF, d/w CM
Improved.  Had BM in last 36 hours.   - c/w bowel regimen
- c/w bowel regimen
- c/w bowel regimen
DNR/DNI  DVT PPX: HSQ
DNR/DNI  DVT PPX: HSQ   PT/OT recs pending  Dispo: pending LTCF, d/w CM
Improved.  Had BM in last 36 hours.   - c/w bowel regimen
Improved.  Had BM in last 36 hours.   - c/w bowel regimen
No BM x 3 days.  - c/w colace and added senna  - One dose of dulcolax today
Remains constipated.    - Dose miralax this AM
Remains constipated.    - Dose miralax this AM

## 2018-03-14 NOTE — PROGRESS NOTE ADULT - PROBLEM SELECTOR PROBLEM 2
Type 2 diabetes mellitus without complication, without long-term current use of insulin

## 2018-03-14 NOTE — PROGRESS NOTE ADULT - PROBLEM SELECTOR PROBLEM 1
Debility

## 2018-03-14 NOTE — PROGRESS NOTE ADULT - PROBLEM SELECTOR PLAN 1
Deconditioning in elderly, denies falls. Was sent from rehab to home, cannot walk 2/2 weakness and only minimally ambulatory with walker  - PT eval - to have PT in LTCF  - D/W  - pending placement in Long term living facility. Unable to care for self at home. Unsafe discharge.
Deconditioning in elderly.  Patient recently in rehab and discharged home at the same time as her .  She is unable to ambulate without assistance.  - Case d/w CM and SW, pending placement in LTCF
Deconditioning in elderly, denies falls. Patient recently in rehab and discharged home at the same time as her .  She is unable to ambulate without assistance.  - c/w PT, dispo either to LTCF w/ PT or home w/ 24/7 HHA w/ PT
Deconditioning in elderly, denies falls. Patient recently in rehab and discharged home at the same time as her .  She is unable to ambulate without assistance.  - c/w PT, dispo either to LTCF w/ PT or home w/ 24/7 HHA w/ PT
Deconditioning in elderly, denies falls. Was sent from rehab to home, cannot walk 2/2 weakness and only minimally ambulatory with walker  - PT eval pending  - D/W  - pending placement in Long term living facility. Unable to care for self at home. Unsafe discharge.
Deconditioning in elderly.  Patient recently in rehab and discharged home at the same time as her .  She is unable to ambulate without assistance.  - Case d/w CM and SW, pending placement in LTCF
Deconditioning in elderly.  Patient recently in rehab and discharged home at the same time as her .  She is unable to ambulate without assistance.  - Case d/w CM and SW, pending placement in LTCF
Deconditioning in elderly.  Patient recently in rehab and discharged home at the same time as her .  She is unable to ambulate without assistance.  - Case d/w CM, pending placement in LTCF
Deconditioning in elderly.  Patient recently in rehab and discharged home at the same time as her .  She is unable to ambulate without assistance.  - Case d/w CM, pending placement in LTCF
Deconditioning in elderly.  Patient recently in rehab and discharged home at the same time as her .  She is unable to ambulate without assistance.  - pending placement in LTCF
Deconditioning in elderly.  Patient recently in rehab and discharged home at the same time as her .  She is unable to ambulate without assistance.  - pending placement in LTCF likely today
Deconditioning in elderly, denies falls. Was sent from rehab to home, cannot walk 2/2 weakness and only minimally ambulatory with walker  - PT eval - to have PT in LTCF  - D/W  - pending placement in Long term living facility. Unable to care for self at home. Unsafe discharge.
Deconditioning in elderly.  Patient recently in rehab and discharged home at the same time as her .  She is unable to ambulate without assistance.  - pending placement in LTCF

## 2018-03-14 NOTE — PROGRESS NOTE ADULT - PROBLEM SELECTOR PLAN 2
At home on januvia  - C/W HISS, pt refusing FS  - consistent carb diet - c/w mechanical soft given without dentition. assistance w/ all meals.
At home on januvia  - C/W HISS, check fsg qac/qhs - PT REFUSING  - januvia is nonformulary here and cannot order  - consistent carb diet - changed to mechanical soft given without dentition. assistance w/ all meals.
At home on januvia  - C/W HISS, check fsg qac/qhs - PT REFUSING  - consistent carb diet - c/w mechanical soft given without dentition. assistance w/ all meals.
At home on januvia  - C/W HISS, check fsg qac/qhs  - consistent carb diet - changed to mechanical soft given without dentition
At home on januvia  - C/W HISS, check fsg qac/qhs - PT REFUSING  - consistent carb diet - c/w mechanical soft given without dentition. assistance w/ all meals.
At home on januvia  - C/W HISS, pt refusing FS  - consistent carb diet - c/w mechanical soft given without dentition. assistance w/ all meals.
At home on januvia  - C/W HISS, pt refusing FS  - consistent carb diet - c/w mechanical soft given without dentition. assistance w/ all meals.
At home on januvia  - C/W HISS, check fsg qac/qhs - PT REFUSING  - januvia is nonformulary here and cannot order  - consistent carb diet - changed to mechanical soft given without dentition

## 2018-03-14 NOTE — PROGRESS NOTE ADULT - PROBLEM SELECTOR PROBLEM 6
Prophylactic measure
Constipation
Prophylactic measure
Constipation
Prophylactic measure
Prophylactic measure

## 2018-03-14 NOTE — PROGRESS NOTE ADULT - PROVIDER SPECIALTY LIST ADULT
Hospitalist
Internal Medicine
Hospitalist
Hospitalist

## 2018-03-14 NOTE — PROGRESS NOTE ADULT - PROBLEM SELECTOR PLAN 3
BP at goal  - c/w losartan 100 mg po qd home dose  - monitor bp
intially hypertensive to SBP 170s on admission, improved w/ addition of CCB  - c/w losartan 100 mg po qd home dose  - started amlodipine 5 mg po qd tolerating well  - monitor bp
BP improved.   - c/w losartan 100 mg po qd home dose  - monitor bp
BP at goal  - c/w losartan 100 mg po qd home dose  - monitor bp
BP at goal  - c/w losartan 100 mg po qd home dose  - monitor bp
BP improved.   - c/w losartan 100 mg po qd home dose  - monitor bp
BP is stable on current regimen.  - c/w losartan 100 mg po qd home dose  - started amlodipine 5 mg po qd tolerating well  - monitor bp
BP is stable on current regimen.  - c/w losartan 100 mg po qd home dose  - started amlodipine 5 mg po qd tolerating well  - monitor bp
BP occasionally soft over past few days since starting norvasc (SBP range from 100-150).  Given risk of falls, risk likely outweighs benefit at this time  - c/w losartan 100 mg po qd home dose  - D/C amlodipine for now and monitor BP  - monitor bp
moderate control  - c/w losartan 100 mg po qd home dose  - monitor bp
moderate control w/ BP fluctuating 110-170  - c/w losartan 100 mg po qd home dose  - monitor bp

## 2018-03-14 NOTE — PROGRESS NOTE ADULT - PROBLEM SELECTOR PLAN 5
w/ residual r sided weakness, chronic.  - c/w plavix  - holding statin as pt has documented allergy to statins

## 2018-05-23 PROCEDURE — 97161 PT EVAL LOW COMPLEX 20 MIN: CPT

## 2018-05-23 PROCEDURE — 97530 THERAPEUTIC ACTIVITIES: CPT

## 2018-05-23 PROCEDURE — 99285 EMERGENCY DEPT VISIT HI MDM: CPT

## 2018-05-23 PROCEDURE — 97116 GAIT TRAINING THERAPY: CPT

## 2018-05-31 NOTE — PATIENT PROFILE ADULT. - NSTOBACCO TYPE_GEN_A_CORE_RD
ROOM # 10  Identified pt with two pt identifiers(name and ). Reviewed record in preparation for visit and have obtained necessary documentation. Chief Complaint   Patient presents with   [facility] Follow Up     copd,sob      Isela David preferred language for health care discussion is english/other. Is the patient using any DME equipment during OV? YES    Isela David is due for: There are no preventive care reminders to display for this patient. Health Maintenance reviewed and discussed per provider  Please order/place referral if appropriate. Advance Directive:  1. Do you have an advance directive in place? Patient Reply: NO    2. If not, would you like material regarding how to put one in place? NO    Coordination of Care:  1. Have you been to the ER, urgent care clinic since your last visit? Hospitalized since your last visit? YES [facility]    2. Have you seen or consulted any other health care providers outside of the [address] [place] [address] since your last visit? Include any pap smears or colon screening. NO    Patient is accompanied by spouse I have received verbal consent from Isela David to discuss any/all medical information while they are present in the room.     Learning Assessment:  Learning Assessment 2014   PRIMARY LEARNER Patient   HIGHEST LEVEL OF EDUCATION - PRIMARY LEARNER  SOME COLLEGE   BARRIERS PRIMARY LEARNER NONE   PRIMARY LANGUAGE ENGLISH   LEARNER PREFERENCE PRIMARY LISTENING   ANSWERED BY self   RELATIONSHIP SELF     Depression Screening:  PHQ over the last two weeks 2018 3/1/2018 2017 8/10/2017 2016 2016 2016   Little interest or pleasure in doing things Not at all Not at all Not at all Not at all Not at all Not at all Not at all   Feeling down, depressed or hopeless Not at all Not at all Not at all Not at all Not at all Not at all Not at all   Total Score PHQ 2 0 0 0 0 0 0 0     Abuse Screening:  Abuse Screening Questionnaire 8/10/2017 2015 Do you ever feel afraid of your partner? N N   Are you in a relationship with someone who physically or mentally threatens you? N N   Is it safe for you to go home? Y Y     Fall Risk  Fall Risk Assessment, last 12 mths 5/31/2018 3/1/2018 10/19/2017 9/25/2017 8/10/2017 12/12/2016 8/30/2016   Able to walk? Yes Yes Yes Yes Yes Yes Yes   Fall in past 12 months? Yes Yes No Yes Yes Yes No   Fall with injury?  No Yes - Yes No Yes -   Number of falls in past 12 months 3 3 - 3 2 3 -   Fall Risk Score 3 4 - 4 2 4 - Cigarettes

## 2018-06-25 NOTE — DIETITIAN INITIAL EVALUATION ADULT. - PHYSICAL APPEARANCE
Observed temporal wasting, sunken eyes, protruding clavicle bone/underweight Patient/Caregiver provided printed discharge information.

## 2018-07-16 ENCOUNTER — INPATIENT (INPATIENT)
Facility: HOSPITAL | Age: 83
LOS: 2 days | Discharge: HOME HEALTH | DRG: 309 | End: 2018-07-19
Attending: INTERNAL MEDICINE | Admitting: INTERNAL MEDICINE
Payer: MEDICARE

## 2018-07-16 VITALS
TEMPERATURE: 98 F | RESPIRATION RATE: 17 BRPM | OXYGEN SATURATION: 98 % | HEART RATE: 83 BPM | DIASTOLIC BLOOD PRESSURE: 68 MMHG | SYSTOLIC BLOOD PRESSURE: 90 MMHG

## 2018-07-16 DIAGNOSIS — Z98.890 OTHER SPECIFIED POSTPROCEDURAL STATES: Chronic | ICD-10-CM

## 2018-07-16 DIAGNOSIS — N20.0 CALCULUS OF KIDNEY: Chronic | ICD-10-CM

## 2018-07-16 DIAGNOSIS — I47.1 SUPRAVENTRICULAR TACHYCARDIA: ICD-10-CM

## 2018-07-16 LAB
ALBUMIN SERPL ELPH-MCNC: 3.4 G/DL — SIGNIFICANT CHANGE UP (ref 3.3–5)
ALP SERPL-CCNC: 54 U/L — SIGNIFICANT CHANGE UP (ref 40–120)
ALT FLD-CCNC: 19 U/L — SIGNIFICANT CHANGE UP (ref 10–45)
ANION GAP SERPL CALC-SCNC: 27 MMOL/L — HIGH (ref 5–17)
APPEARANCE UR: CLEAR — SIGNIFICANT CHANGE UP
APTT BLD: 179.4 SEC — CRITICAL HIGH (ref 27.5–37.4)
APTT BLD: 28.7 SEC — SIGNIFICANT CHANGE UP (ref 27.5–37.4)
AST SERPL-CCNC: 42 U/L — HIGH (ref 10–40)
BASOPHILS # BLD AUTO: 0.1 K/UL — SIGNIFICANT CHANGE UP (ref 0–0.2)
BASOPHILS NFR BLD AUTO: 0.5 % — SIGNIFICANT CHANGE UP (ref 0–2)
BILIRUB SERPL-MCNC: 0.6 MG/DL — SIGNIFICANT CHANGE UP (ref 0.2–1.2)
BILIRUB UR-MCNC: NEGATIVE — SIGNIFICANT CHANGE UP
BUN SERPL-MCNC: 12 MG/DL — SIGNIFICANT CHANGE UP (ref 7–23)
CALCIUM SERPL-MCNC: 8.9 MG/DL — SIGNIFICANT CHANGE UP (ref 8.4–10.5)
CHLORIDE SERPL-SCNC: 104 MMOL/L — SIGNIFICANT CHANGE UP (ref 96–108)
CK MB BLD-MCNC: 6.5 % — HIGH (ref 0–3.5)
CK MB CFR SERPL CALC: 10.6 NG/ML — HIGH (ref 0–3.8)
CK SERPL-CCNC: 164 U/L — SIGNIFICANT CHANGE UP (ref 25–170)
CO2 SERPL-SCNC: 15 MMOL/L — LOW (ref 22–31)
COLOR SPEC: YELLOW — SIGNIFICANT CHANGE UP
CREAT SERPL-MCNC: 1.27 MG/DL — SIGNIFICANT CHANGE UP (ref 0.5–1.3)
DIFF PNL FLD: ABNORMAL
EOSINOPHIL # BLD AUTO: 0 K/UL — SIGNIFICANT CHANGE UP (ref 0–0.5)
EOSINOPHIL NFR BLD AUTO: 0.4 % — SIGNIFICANT CHANGE UP (ref 0–6)
EPI CELLS # UR: SIGNIFICANT CHANGE UP /HPF
GAS PNL BLDV: SIGNIFICANT CHANGE UP
GLUCOSE SERPL-MCNC: 127 MG/DL — HIGH (ref 70–99)
GLUCOSE UR QL: NEGATIVE — SIGNIFICANT CHANGE UP
HCT VFR BLD CALC: 33.7 % — LOW (ref 34.5–45)
HCT VFR BLD CALC: 35.6 % — SIGNIFICANT CHANGE UP (ref 34.5–45)
HGB BLD-MCNC: 11 G/DL — LOW (ref 11.5–15.5)
HGB BLD-MCNC: 11.5 G/DL — SIGNIFICANT CHANGE UP (ref 11.5–15.5)
HYALINE CASTS # UR AUTO: ABNORMAL
INR BLD: 1.03 RATIO — SIGNIFICANT CHANGE UP (ref 0.88–1.16)
KETONES UR-MCNC: ABNORMAL
LEUKOCYTE ESTERASE UR-ACNC: ABNORMAL
LYMPHOCYTES # BLD AUTO: 1.6 K/UL — SIGNIFICANT CHANGE UP (ref 1–3.3)
LYMPHOCYTES # BLD AUTO: 12.1 % — LOW (ref 13–44)
MCHC RBC-ENTMCNC: 30.3 PG — SIGNIFICANT CHANGE UP (ref 27–34)
MCHC RBC-ENTMCNC: 30.7 PG — SIGNIFICANT CHANGE UP (ref 27–34)
MCHC RBC-ENTMCNC: 32.4 GM/DL — SIGNIFICANT CHANGE UP (ref 32–36)
MCHC RBC-ENTMCNC: 32.5 GM/DL — SIGNIFICANT CHANGE UP (ref 32–36)
MCV RBC AUTO: 93.1 FL — SIGNIFICANT CHANGE UP (ref 80–100)
MCV RBC AUTO: 94.7 FL — SIGNIFICANT CHANGE UP (ref 80–100)
MONOCYTES # BLD AUTO: 0.9 K/UL — SIGNIFICANT CHANGE UP (ref 0–0.9)
MONOCYTES NFR BLD AUTO: 6.8 % — SIGNIFICANT CHANGE UP (ref 2–14)
NEUTROPHILS # BLD AUTO: 10.7 K/UL — HIGH (ref 1.8–7.4)
NEUTROPHILS NFR BLD AUTO: 80.2 % — HIGH (ref 43–77)
NITRITE UR-MCNC: NEGATIVE — SIGNIFICANT CHANGE UP
NT-PROBNP SERPL-SCNC: 973 PG/ML — HIGH (ref 0–300)
PH UR: 7 — SIGNIFICANT CHANGE UP (ref 5–8)
PLATELET # BLD AUTO: 301 K/UL — SIGNIFICANT CHANGE UP (ref 150–400)
PLATELET # BLD AUTO: 373 K/UL — SIGNIFICANT CHANGE UP (ref 150–400)
POTASSIUM SERPL-MCNC: 4.2 MMOL/L — SIGNIFICANT CHANGE UP (ref 3.5–5.3)
POTASSIUM SERPL-SCNC: 4.2 MMOL/L — SIGNIFICANT CHANGE UP (ref 3.5–5.3)
PROT SERPL-MCNC: 6.4 G/DL — SIGNIFICANT CHANGE UP (ref 6–8.3)
PROT UR-MCNC: 100 MG/DL
PROTHROM AB SERPL-ACNC: 11.2 SEC — SIGNIFICANT CHANGE UP (ref 9.8–12.7)
RBC # BLD: 3.62 M/UL — LOW (ref 3.8–5.2)
RBC # BLD: 3.76 M/UL — LOW (ref 3.8–5.2)
RBC # FLD: 11.9 % — SIGNIFICANT CHANGE UP (ref 10.3–14.5)
RBC # FLD: 12.1 % — SIGNIFICANT CHANGE UP (ref 10.3–14.5)
RBC CASTS # UR COMP ASSIST: SIGNIFICANT CHANGE UP /HPF (ref 0–2)
SODIUM SERPL-SCNC: 146 MMOL/L — HIGH (ref 135–145)
SP GR SPEC: 1.01 — SIGNIFICANT CHANGE UP (ref 1.01–1.02)
TROPONIN T, HIGH SENSITIVITY RESULT: 158 NG/L — HIGH (ref 0–51)
TROPONIN T, HIGH SENSITIVITY RESULT: 254 NG/L — HIGH (ref 0–51)
UROBILINOGEN FLD QL: NEGATIVE — SIGNIFICANT CHANGE UP
WBC # BLD: 13.4 K/UL — HIGH (ref 3.8–10.5)
WBC # BLD: 15.3 K/UL — HIGH (ref 3.8–10.5)
WBC # FLD AUTO: 13.4 K/UL — HIGH (ref 3.8–10.5)
WBC # FLD AUTO: 15.3 K/UL — HIGH (ref 3.8–10.5)
WBC UR QL: SIGNIFICANT CHANGE UP /HPF (ref 0–5)

## 2018-07-16 PROCEDURE — 71045 X-RAY EXAM CHEST 1 VIEW: CPT | Mod: 26

## 2018-07-16 PROCEDURE — 99291 CRITICAL CARE FIRST HOUR: CPT

## 2018-07-16 PROCEDURE — 93010 ELECTROCARDIOGRAM REPORT: CPT

## 2018-07-16 PROCEDURE — 99292 CRITICAL CARE ADDL 30 MIN: CPT

## 2018-07-16 PROCEDURE — 93308 TTE F-UP OR LMTD: CPT | Mod: 26

## 2018-07-16 RX ORDER — HEPARIN SODIUM 5000 [USP'U]/ML
2900 INJECTION INTRAVENOUS; SUBCUTANEOUS EVERY 6 HOURS
Qty: 0 | Refills: 0 | Status: DISCONTINUED | OUTPATIENT
Start: 2018-07-16 | End: 2018-07-18

## 2018-07-16 RX ORDER — SODIUM CHLORIDE 9 MG/ML
1000 INJECTION INTRAMUSCULAR; INTRAVENOUS; SUBCUTANEOUS ONCE
Qty: 0 | Refills: 0 | Status: COMPLETED | OUTPATIENT
Start: 2018-07-16 | End: 2018-07-16

## 2018-07-16 RX ORDER — CLOPIDOGREL BISULFATE 75 MG/1
75 TABLET, FILM COATED ORAL DAILY
Qty: 0 | Refills: 0 | Status: DISCONTINUED | OUTPATIENT
Start: 2018-07-16 | End: 2018-07-19

## 2018-07-16 RX ORDER — HEPARIN SODIUM 5000 [USP'U]/ML
450 INJECTION INTRAVENOUS; SUBCUTANEOUS
Qty: 25000 | Refills: 0 | Status: DISCONTINUED | OUTPATIENT
Start: 2018-07-16 | End: 2018-07-18

## 2018-07-16 RX ORDER — HEPARIN SODIUM 5000 [USP'U]/ML
INJECTION INTRAVENOUS; SUBCUTANEOUS
Qty: 25000 | Refills: 0 | Status: DISCONTINUED | OUTPATIENT
Start: 2018-07-16 | End: 2018-07-16

## 2018-07-16 RX ORDER — ADENOSINE 3 MG/ML
6 INJECTION INTRAVENOUS ONCE
Qty: 0 | Refills: 0 | Status: DISCONTINUED | OUTPATIENT
Start: 2018-07-16 | End: 2018-07-16

## 2018-07-16 RX ORDER — HEPARIN SODIUM 5000 [USP'U]/ML
3200 INJECTION INTRAVENOUS; SUBCUTANEOUS ONCE
Qty: 0 | Refills: 0 | Status: COMPLETED | OUTPATIENT
Start: 2018-07-16 | End: 2018-07-16

## 2018-07-16 RX ORDER — FENTANYL CITRATE 50 UG/ML
50 INJECTION INTRAVENOUS ONCE
Qty: 0 | Refills: 0 | Status: DISCONTINUED | OUTPATIENT
Start: 2018-07-16 | End: 2018-07-16

## 2018-07-16 RX ADMIN — SODIUM CHLORIDE 1000 MILLILITER(S): 9 INJECTION INTRAMUSCULAR; INTRAVENOUS; SUBCUTANEOUS at 11:14

## 2018-07-16 RX ADMIN — FENTANYL CITRATE 50 MICROGRAM(S): 50 INJECTION INTRAVENOUS at 11:25

## 2018-07-16 RX ADMIN — HEPARIN SODIUM 650 UNIT(S)/HR: 5000 INJECTION INTRAVENOUS; SUBCUTANEOUS at 13:23

## 2018-07-16 RX ADMIN — CLOPIDOGREL BISULFATE 75 MILLIGRAM(S): 75 TABLET, FILM COATED ORAL at 18:07

## 2018-07-16 RX ADMIN — HEPARIN SODIUM 450 UNIT(S)/HR: 5000 INJECTION INTRAVENOUS; SUBCUTANEOUS at 21:20

## 2018-07-16 RX ADMIN — SODIUM CHLORIDE 1000 MILLILITER(S): 9 INJECTION INTRAMUSCULAR; INTRAVENOUS; SUBCUTANEOUS at 10:00

## 2018-07-16 RX ADMIN — FENTANYL CITRATE 50 MICROGRAM(S): 50 INJECTION INTRAVENOUS at 10:30

## 2018-07-16 RX ADMIN — HEPARIN SODIUM 0 UNIT(S)/HR: 5000 INJECTION INTRAVENOUS; SUBCUTANEOUS at 20:01

## 2018-07-16 RX ADMIN — HEPARIN SODIUM 3200 UNIT(S): 5000 INJECTION INTRAVENOUS; SUBCUTANEOUS at 13:25

## 2018-07-16 NOTE — ED PROVIDER NOTE - CRITICAL CARE PROVIDED
documentation/conducted a detailed discussion of DNR status/additional history taking/direct patient care (not related to procedure)/interpretation of diagnostic studies/consultation with other physicians

## 2018-07-16 NOTE — CONSULT NOTE ADULT - SUBJECTIVE AND OBJECTIVE BOX
HPI:   Patient is a 88y female with a past history of basal cell cancer of face, prior CVA with right sided weakness who was admitted to hospital today with a rapid SVT,diaphoresis,and hypotension.She has done well after cardioversion.She is afebrile with a wbc of 13,000.She is alert and denies any fever,dysuria, or abdominal pain.She is asking for fluids.She has an open wound on rt cheek, ? duration.She also has a few lesions on rt neck,? how long present.    REVIEW OF SYSTEMS:  All other review of systems negative (Comprehensive ROS)    PAST MEDICAL & SURGICAL HISTORY:  Stroke  Hypertension  Borderline diabetes  H/O hemorrhoidectomy  Nephrolithiasis  Cataract, bilateral      Allergies    aspirin (Unknown)  codeine (Nausea)  morphine (Vomiting)  penicillins (Unknown)  red dye (Unknown)  statins (Unknown)    Intolerances        Antimicrobials Day #  :    Other Medications:  clopidogrel Tablet 75 milliGRAM(s) Oral daily  heparin  Infusion.  Unit(s)/Hr IV Continuous <Continuous>      FAMILY HISTORY:  No pertinent family history in first degree relatives      SOCIAL HISTORY:  Smoking:   no  ETOH:no     Drug Use:no       T(F): 98.3 (18 @ 15:44), Max: 98.4 (18 @ 09:46)  HR: 88 (18 @ 15:44)  BP: 131/78 (18 @ 15:44)  RR: 18 (18 @ 15:44)  SpO2: 98% (18 @ 15:44)  Wt(kg): --    PHYSICAL EXAM:  General: alert, no acute distress, frail appearing  Eyes:  anicteric, no conjunctival injection, no discharge  Oropharynx: dry mucosa, rt cheek with superficial ulceration and areas of crusting.A few scattered papular lesion on rt neck and supraclavicular region.	  Neck: supple, without adenopathy  Lungs: clear to auscultation  Heart: regular rate and rhythm; no murmur, rubs or gallops  Abdomen: soft, nondistended, nontender, without mass or organomegaly  Skin: no lesions  Extremities: no clubbing, cyanosis, or edema  Neurologic: alert, oriented, moves all extremities    LAB RESULTS:                        11.5   13.4  )-----------( 373      ( 2018 10:52 )             35.6     07-    146<H>  |  104  |  12  ----------------------------<  127<H>  4.2   |  15<L>  |  1.27    Ca    8.9      2018 10:52    TPro  6.4  /  Alb  3.4  /  TBili  0.6  /  DBili  x   /  AST  42<H>  /  ALT  19  /  AlkPhos  54  -    LIVER FUNCTIONS - ( 2018 10:52 )  Alb: 3.4 g/dL / Pro: 6.4 g/dL / ALK PHOS: 54 U/L / ALT: 19 U/L / AST: 42 U/L / GGT: x           Urinalysis Basic - ( 2018 13:05 )    Color: Yellow / Appearance: Clear / S.015 / pH: x  Gluc: x / Ketone: Trace  / Bili: Negative / Urobili: Negative   Blood: x / Protein: 100 mg/dL / Nitrite: Negative   Leuk Esterase: Small / RBC: 3-5 /HPF / WBC 6-10 /HPF   Sq Epi: x / Non Sq Epi: OCC /HPF / Bacteria: x        MICROBIOLOGY:  RECENT CULTURES:  blood and urine cultures are pending      RADIOLOGY REVIEWED:  < from: Xray Chest 1 View- PORTABLE-Urgent (18 @ 11:09) >  INTERPRETATION:  A single chest x-ray was obtained on 2018.    Indication: Shortness of breath.    Impression:    The heart is normal in size. The lungs are clear. The bones are slightly   demineralized. Degenerative changes of the thoracic spine.    < end of copied text >
HPI:       88y Female complaining of shortness of breath.	  h/o  of CVA (2015 with residual R sided weakness/pain),   T2DM ,  HTN, BCC on face , p/w shortness of breath from nursing home. Pt has been tachypneic w/ chest  wall  retraction,  over the last two hours, placed on NC 3 L and transferred to ED. in  er, pt  with  SVT, s/p shock now  in nsr	    alert in er now,  doing  better    REVIEW OF SYSTEMS:  GEN: no fever,    no chills  RESP: SOB,   no cough  CVS: no chest pain,   no palpitations  GI: no abdominal pain,   no nausea,   no vomiting,   no constipation,   no diarrhea  : no dysuria,   no frequency  NEURO: no headache,   no dizziness  PSYCH: no depression,   not anxious  Derm : no rash    Allergies    aspirin (Unknown)  codeine (Nausea)  morphine (Vomiting)  penicillins (Unknown)  red dye (Unknown)  statins (Unknown)    Intolerances        CAPILLARY BLOOD GLUCOSE        I&O's Summary      Vital Signs Last 24 Hrs  T(C): 36.9 (2018 09:46), Max: 36.9 (2018 09:46)  T(F): 98.4 (2018 09:46), Max: 98.4 (2018 09:46)  HR: 87 (2018 13:25) (83 - 166)  BP: 143/71 (2018 13:25) (5/51 - 143/71)  BP(mean): --  RR: 20 (2018 13:25) (17 - 42)  SpO2: 100% (2018 13:25) (95% - 100%)  PHYSICAL EXAM:  GENERAL: NAD,   HEAD:  Atraumatic, Normocephalic  EYES: EOMI,  NECK: Supple, No JVD  CHEST/LUNG: Clear to auscultation bilaterally; No wheeze  HEART: Regular rate and rhythm;        murmur  ABDOMEN: Soft, Nontender,   EXTREMITIES:   no      edema,  facial  BCC, right side,    NEUROLOGY:  alert    MEDICATIONS  (STANDING):  heparin  Infusion.  Unit(s)/Hr (6.5 mL/Hr) IV Continuous <Continuous>    MEDICATIONS  (PRN):    LABS:                        11.5   13.4  )-----------( 373      ( 2018 10:52 )             35.6     07-16    146<H>  |  104  |  12  ----------------------------<  127<H>  4.2   |  15<L>  |  1.27    Ca    8.9      2018 10:52    TPro  6.4  /  Alb  3.4  /  TBili  0.6  /  DBili  x   /  AST  42<H>  /  ALT  19  /  AlkPhos  54      PT/INR - ( 2018 10:52 )   PT: 11.2 sec;   INR: 1.03 ratio         PTT - ( 2018 10:52 )  PTT:28.7 sec      Urinalysis Basic - ( 2018 13:05 )    Color: Yellow / Appearance: Clear / S.015 / pH: x  Gluc: x / Ketone: Trace  / Bili: Negative / Urobili: Negative   Blood: x / Protein: 100 mg/dL / Nitrite: Negative   Leuk Esterase: Small / RBC: 3-5 /HPF / WBC 6-10 /HPF   Sq Epi: x / Non Sq Epi: OCC /HPF / Bacteria: x           @ 10:52  4.0  75              Consultant(s) Notes Reviewed:      Care Discussed with Consultants/Other Providers:  Plan:
Patient is a 88y old  Female who presents with a chief complaint of dyspnea.    HPI: 88F with prior CVA (2015 with residual R sided weakness/pain), DM, HTN, BCC on face, p/w shortness of breath from nursing home. Pt has been tachypneic w/ retractions over the last two hours, placed on NC 3 L and transferred to ED. Pt in SVT, HR 160s, and hypotensive s/p DCCV in ER. Now NSR but still low BP.           *****PAST MEDICAL / Surgical  HISTORY:  PAST MEDICAL & SURGICAL HISTORY:  Stroke  Hypertension  Borderline diabetes  H/O hemorrhoidectomy  Nephrolithiasis  Cataract, bilateral           *****FAMILY HISTORY:  FAMILY HISTORY:  No pertinent family history in first degree relatives           *****SOCIAL HISTORY:  Alcohol: None  Smoking: None         *****ALLERGIES:   Allergies    aspirin (Unknown)  codeine (Nausea)  morphine (Vomiting)  penicillins (Unknown)  red dye (Unknown)  statins (Unknown)    Intolerances             *****MEDICATIONS:  MEDICATIONS  (STANDING):    MEDICATIONS  (PRN):           *****REVIEW OF SYSTEM:  GEN: no fever, no chills, no pain  RESP: no SOB, no cough, no sputum  CVS: no chest pain, no palpitations, no edema  GI: no abdominal pain, no nausea, no vomiting, no constipation, no diarrhea  : no dysurea, no frequency, no hematurea  Neuro: no headache, no dizziness  PSYCH: no anxiety, no depression  Derm : no itching, no rash         *****VITAL SIGNS:  T(C): 36.9 (18 @ 09:46), Max: 36.9 (18 @ 09:46)  HR: 92 (18 @ 11:15) (83 - 166)  BP: 87/42 (18 @ 11:15) (/51 - 90/68)  RR: 24 (18 @ 11:15) (17 - 42)  SpO2: 100% (18 @ 11:15) (95% - 100%)  Wt(kg): --           *****PHYSICAL EXAM:  GEN: A&O X 3 , NAD , comfortable  HEENT: NCAT, PERRL, MMM, hearing intact  Neck: supple , no JVD  CVS: S1S2 , regular , No M/R/G appreciated  PULM: CTA B/L,  no W/R/R appreciated  ABD.: soft. non tender, non distended,  bowel sounds present  Extrem: intact pulses , no edema   Derm: No rash , no ecchymoses  PSYCH : normal mood,  no delusion not anxious         *****LAB AND IMAGIN.5   13.4  )-----------( 373      ( 2018 10:52 )             35.6               07-16    146<H>  |  104  |  12  ----------------------------<  127<H>  4.2   |  15<L>  |  1.27    Ca    8.9      2018 10:52    TPro  6.4  /  Alb  3.4  /  TBili  0.6  /  DBili  x   /  AST  42<H>  /  ALT  19  /  AlkPhos  54  07-16    PT/INR - ( 2018 10:52 )   PT: 11.2 sec;   INR: 1.03 ratio         PTT - ( 2018 10:52 )  PTT:28.7 sec                            [All pertinent recent Imaging reports reviewed]         *****A S S E S S M E N T   A N D   P L A N :  88F with SOB, suspected sepsis  hold anti-hypertensive meds  aggressive IVF  check UA, UCx, BCx  consider empiric abx  cont tele monitoring  MICU eval  ID eval  bedside echo being done in ER  consider formal echo when more stable    ___________________________  Will follow with you.  Thank you,  KEIRY Gillespie D.O.

## 2018-07-16 NOTE — ED PROVIDER NOTE - ATTENDING CONTRIBUTION TO CARE
Nemes - 87yo F from NH, poor historian, in significant respiratory distress, sent from NH for SOB/respiratory distress. No other info available at the time of eval. HR-165, steady, no change w breathing, fluids, valsalva maneuvers. BP 60/40. EKG w narrow complex QRS, no Ps noted. Likely SVT, unstable. D/w pt need for cardioversion. Fentanyl given, 150 J delivered w conversion to NSR. Pt tolerated procedure well. Will get cardiac labs, admit. SVT, poss PE.

## 2018-07-16 NOTE — H&P ADULT - NSHPLABSRESULTS_GEN_ALL_CORE
11.5   13.4  )-----------( 373      ( 2018 10:52 )             35.6               07-16    146<H>  |  104  |  12  ----------------------------<  127<H>  4.2   |  15<L>  |  1.27    Ca    8.9      2018 10:52    TPro  6.4  /  Alb  3.4  /  TBili  0.6  /  DBili  x   /  AST  42<H>  /  ALT  19  /  AlkPhos  54  07-16    PT/INR - ( 2018 10:52 )   PT: 11.2 sec;   INR: 1.03 ratio         PTT - ( 2018 10:52 )  PTT:28.7 sec                   Urinalysis Basic - ( 2018 13:05 )    Color: Yellow / Appearance: Clear / S.015 / pH: x  Gluc: x / Ketone: Trace  / Bili: Negative / Urobili: Negative   Blood: x / Protein: 100 mg/dL / Nitrite: Negative   Leuk Esterase: Small / RBC: 3-5 /HPF / WBC 6-10 /HPF   Sq Epi: x / Non Sq Epi: OCC /HPF / Bacteria: x

## 2018-07-16 NOTE — ED PROVIDER NOTE - OBJECTIVE STATEMENT
MH of CVA (7/2015 with residual R sided weakness/pain), T2DM not on insulin, HTN, BCC on face, p/w shortness of breath from nursing home. Pt has been tachypneic w/ retractions over the last two hours, placed on NC 3 L and transferred to ED.

## 2018-07-16 NOTE — ED PROVIDER NOTE - MEDICAL DECISION MAKING DETAILS
MH of CVA (7/2015 with residual R sided weakness/pain), T2DM not on insulin, HTN, BCC on face, p/w shortness of breath from nursing home. Pt has been tachypneic w/ retractions over the last two hours, placed on NC 3 L and transferred to ED. Pt presented in unstable SVT, converted to sinus after syn cardioversion. BP improving s/p shock and fluids. Trop elevated 2/2 to either shock vs ACS component, will initiate AC. PHILIP Patel

## 2018-07-16 NOTE — ED PROVIDER NOTE - PROGRESS NOTE DETAILS
Pt in SVT on EKG, hypotensive, tachypneic, cold and clammy. Performed synchronized cardioversion. Pt now in sinus rhythm, BP improving -Isaías Nemes - stable VS and much improved clinical status after synchronized cardioversion. Trops elevated, bedside ED Echo w anterior wall cardiac abnormality, no pericardial effusion. Will anticoagulate, will admit for further w/u, poss VQ scan r/o PE, cards consult

## 2018-07-16 NOTE — PROVIDER CONTACT NOTE (CRITICAL VALUE NOTIFICATION) - ACTION/TREATMENT ORDERED:
Lissette Cruz NP aware and acknowledged. Follow ACS heparin nomogram, further orders to follow. Will continue to monitor.

## 2018-07-16 NOTE — ED PROVIDER NOTE - PHYSICAL EXAMINATION
tachypnea, grunting, respiratory distress  diffuse upper breath sounds transmitted thought-out both lungs  chr facial orash

## 2018-07-16 NOTE — H&P ADULT - ASSESSMENT
88F r/o sepsis  see plan on cardiology consult 88F with SOB, suspected sepsis  hold anti-hypertensive meds  aggressive IVF  check UA, UCx, BCx  consider empiric abx  cont tele monitoring  MICU eval  ID eval  bedside echo being done in ER  consider formal echo when more stable

## 2018-07-16 NOTE — CONSULT NOTE ADULT - ASSESSMENT
pt  admitted with  sob,  in  er , pt was  hypotensive./ clammy,    , noted  with  rapid  svt in er, treated  by  er/ s.p  cardioversion   tele, seen by  card  elevated  wbc,  mild  hypernatremia  positive  HsTtoponin, elevated bnp  echo  dm,  follow fs  uti,  rocephin  dvt  ppx  pt  eval pt  admitted with  sob,  in  er , pt was  hypotensive./ clammy,    , noted  with  rapid  svt in er, treated  by  er/ s.p  cardioversion  doing much betetr now  pt  alert, oriented to place/ time  h/o  cva, htn.  dm 2   tele, seen by  card  elevated  wbc,  mild  hypernatremia, cxr normal  positive  HsTtoponin,,  no  acute  ischemic  changes on ekg,  elevated bnp  echo  dm,  follow fs  uti,  rocephin  on iv heparin by  er,  defer   to  card  dvt  ppx  pt is dnr from n home  pt  eval

## 2018-07-16 NOTE — ED ADULT NURSE NOTE - OBJECTIVE STATEMENT
pt presents from [facility] with SOB. upon presentation pt tachycardia/tachypnea. pt appeared to be in SVT. pt was shocked with 150 jules at 1030 am. SVT broke pt's HR went down to .

## 2018-07-16 NOTE — CONSULT NOTE ADULT - ASSESSMENT
Elderly female with a history of CVA with right sided weakness admitted with shortness of breath related to SVT which has improved after cardioversion.  I suspect a "stress" leukocytosis.  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  She appears improved, no dyspnea at present time.  Suggest:  1.okay to hold off on antibiotics  2.cultures have been sent  3.Serial wbc counts  4.Supportive care, can start CTX if she spikes

## 2018-07-17 LAB
ANION GAP SERPL CALC-SCNC: 14 MMOL/L — SIGNIFICANT CHANGE UP (ref 5–17)
APTT BLD: 51.7 SEC — HIGH (ref 27.5–37.4)
APTT BLD: 54.4 SEC — HIGH (ref 27.5–37.4)
APTT BLD: 91.1 SEC — HIGH (ref 27.5–37.4)
BUN SERPL-MCNC: 16 MG/DL — SIGNIFICANT CHANGE UP (ref 7–23)
CALCIUM SERPL-MCNC: 8.5 MG/DL — SIGNIFICANT CHANGE UP (ref 8.4–10.5)
CHLORIDE SERPL-SCNC: 104 MMOL/L — SIGNIFICANT CHANGE UP (ref 96–108)
CO2 SERPL-SCNC: 23 MMOL/L — SIGNIFICANT CHANGE UP (ref 22–31)
CREAT SERPL-MCNC: 0.85 MG/DL — SIGNIFICANT CHANGE UP (ref 0.5–1.3)
CULTURE RESULTS: SIGNIFICANT CHANGE UP
GLUCOSE SERPL-MCNC: 85 MG/DL — SIGNIFICANT CHANGE UP (ref 70–99)
HCT VFR BLD CALC: 33.2 % — LOW (ref 34.5–45)
HCT VFR BLD CALC: 33.3 % — LOW (ref 34.5–45)
HGB BLD-MCNC: 10.4 G/DL — LOW (ref 11.5–15.5)
HGB BLD-MCNC: 11 G/DL — LOW (ref 11.5–15.5)
MCHC RBC-ENTMCNC: 28.4 PG — SIGNIFICANT CHANGE UP (ref 27–34)
MCHC RBC-ENTMCNC: 31 PG — SIGNIFICANT CHANGE UP (ref 27–34)
MCHC RBC-ENTMCNC: 31.2 GM/DL — LOW (ref 32–36)
MCHC RBC-ENTMCNC: 33.2 GM/DL — SIGNIFICANT CHANGE UP (ref 32–36)
MCV RBC AUTO: 91 FL — SIGNIFICANT CHANGE UP (ref 80–100)
MCV RBC AUTO: 93.3 FL — SIGNIFICANT CHANGE UP (ref 80–100)
PLATELET # BLD AUTO: 288 K/UL — SIGNIFICANT CHANGE UP (ref 150–400)
PLATELET # BLD AUTO: 302 K/UL — SIGNIFICANT CHANGE UP (ref 150–400)
POTASSIUM SERPL-MCNC: 4.5 MMOL/L — SIGNIFICANT CHANGE UP (ref 3.5–5.3)
POTASSIUM SERPL-SCNC: 4.5 MMOL/L — SIGNIFICANT CHANGE UP (ref 3.5–5.3)
RBC # BLD: 3.56 M/UL — LOW (ref 3.8–5.2)
RBC # BLD: 3.66 M/UL — LOW (ref 3.8–5.2)
RBC # FLD: 12.2 % — SIGNIFICANT CHANGE UP (ref 10.3–14.5)
RBC # FLD: 13.7 % — SIGNIFICANT CHANGE UP (ref 10.3–14.5)
SODIUM SERPL-SCNC: 141 MMOL/L — SIGNIFICANT CHANGE UP (ref 135–145)
SPECIMEN SOURCE: SIGNIFICANT CHANGE UP
WBC # BLD: 9.6 K/UL — SIGNIFICANT CHANGE UP (ref 3.8–10.5)
WBC # BLD: 9.98 K/UL — SIGNIFICANT CHANGE UP (ref 3.8–10.5)
WBC # FLD AUTO: 9.6 K/UL — SIGNIFICANT CHANGE UP (ref 3.8–10.5)
WBC # FLD AUTO: 9.98 K/UL — SIGNIFICANT CHANGE UP (ref 3.8–10.5)

## 2018-07-17 RX ORDER — HYDROCORTISONE 1 %
1 OINTMENT (GRAM) TOPICAL
Qty: 0 | Refills: 0 | Status: DISCONTINUED | OUTPATIENT
Start: 2018-07-17 | End: 2018-07-18

## 2018-07-17 RX ORDER — METOPROLOL TARTRATE 50 MG
12.5 TABLET ORAL ONCE
Qty: 0 | Refills: 0 | Status: COMPLETED | OUTPATIENT
Start: 2018-07-17 | End: 2018-07-17

## 2018-07-17 RX ORDER — RANITIDINE HYDROCHLORIDE 150 MG/1
1 TABLET, FILM COATED ORAL
Qty: 0 | Refills: 0 | COMMUNITY

## 2018-07-17 RX ORDER — METOPROLOL TARTRATE 50 MG
25 TABLET ORAL DAILY
Qty: 0 | Refills: 0 | Status: DISCONTINUED | OUTPATIENT
Start: 2018-07-17 | End: 2018-07-17

## 2018-07-17 RX ORDER — HYDROCORTISONE 1 %
1 OINTMENT (GRAM) TOPICAL
Qty: 0 | Refills: 0 | COMMUNITY

## 2018-07-17 RX ORDER — METOPROLOL TARTRATE 50 MG
TABLET ORAL
Qty: 0 | Refills: 0 | Status: DISCONTINUED | OUTPATIENT
Start: 2018-07-17 | End: 2018-07-19

## 2018-07-17 RX ORDER — METOPROLOL TARTRATE 50 MG
12.5 TABLET ORAL
Qty: 0 | Refills: 0 | Status: DISCONTINUED | OUTPATIENT
Start: 2018-07-17 | End: 2018-07-19

## 2018-07-17 RX ORDER — ACETAMINOPHEN 500 MG
650 TABLET ORAL EVERY 6 HOURS
Qty: 0 | Refills: 0 | Status: DISCONTINUED | OUTPATIENT
Start: 2018-07-17 | End: 2018-07-19

## 2018-07-17 RX ADMIN — HEPARIN SODIUM 350 UNIT(S)/HR: 5000 INJECTION INTRAVENOUS; SUBCUTANEOUS at 04:34

## 2018-07-17 RX ADMIN — HEPARIN SODIUM 350 UNIT(S)/HR: 5000 INJECTION INTRAVENOUS; SUBCUTANEOUS at 11:08

## 2018-07-17 RX ADMIN — CLOPIDOGREL BISULFATE 75 MILLIGRAM(S): 75 TABLET, FILM COATED ORAL at 12:38

## 2018-07-17 RX ADMIN — Medication 1 APPLICATION(S): at 17:12

## 2018-07-17 RX ADMIN — HEPARIN SODIUM 0 UNIT(S)/HR: 5000 INJECTION INTRAVENOUS; SUBCUTANEOUS at 04:00

## 2018-07-17 RX ADMIN — Medication 12.5 MILLIGRAM(S): at 17:12

## 2018-07-17 RX ADMIN — Medication 12.5 MILLIGRAM(S): at 12:38

## 2018-07-17 RX ADMIN — HEPARIN SODIUM 450 UNIT(S)/HR: 5000 INJECTION INTRAVENOUS; SUBCUTANEOUS at 17:40

## 2018-07-17 NOTE — PROGRESS NOTE ADULT - ASSESSMENT
pt  admitted with  sob,  in  er , pt was  hypotensive./ clammy,    , noted  with  rapid  svt in er, treated  by  er/ s.p  cardioversion  doing much betetr now  pt  alert, oriented to place/ time  h/o  cva, htn.  dm 2  cxr normal  positive  HsTtoponin,,  no  acute  ischemic  changes on ekg,  elevated bnp  echo  dm,  follow fs  no  ab, per  ID  on iv heparin , ?  benefit/ indication,   defer   to  card  dvt  ppx  pt is dnr from n home  pt  eval

## 2018-07-17 NOTE — CHART NOTE - NSCHARTNOTEFT_GEN_A_CORE
Informed by RN aptt (Critical)= 179.4. Pt seen at bedside. Rechecked weight. No sign or symptoms of bleeding.  1) Supratherapeutic aptt  - Weight updated in system  - Heparin reordered with correct weight and given.  - Recheck appt in 6 hours from when dose was adjusted     ~Lissette WILSON-c  [place] [id number]

## 2018-07-17 NOTE — PROGRESS NOTE ADULT - ASSESSMENT
Elderly female with a history of CVA with right sided weakness admitted with shortness of breath related to SVT which has improved after cardioversion.  I suspect a "stress" leukocytosis.  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

## 2018-07-17 NOTE — PROVIDER CONTACT NOTE (OTHER) - ACTION/TREATMENT ORDERED:
Kimberly Moe, TROY aware and acknowledged. As per NP, attempt again in 2 hours, and if patient continues refusal then draw with morning time labwork. Will continue to monitor.

## 2018-07-17 NOTE — PROVIDER CONTACT NOTE (OTHER) - ASSESSMENT
Patient is A&Ox4, forgetful. VSS. No complaints, asymptomatic. Refusing to have bloodwork drawn. RN provided education regarding high risk medication and importance of monitoring lab values. Patient states "I don't care, then let me die" and continues refusal, becoming combative towards RN.

## 2018-07-18 ENCOUNTER — TRANSCRIPTION ENCOUNTER (OUTPATIENT)
Age: 83
End: 2018-07-18

## 2018-07-18 LAB
ANION GAP SERPL CALC-SCNC: 15 MMOL/L — SIGNIFICANT CHANGE UP (ref 5–17)
APTT BLD: 60.6 SEC — HIGH (ref 27.5–37.4)
BUN SERPL-MCNC: 11 MG/DL — SIGNIFICANT CHANGE UP (ref 7–23)
CALCIUM SERPL-MCNC: 8.5 MG/DL — SIGNIFICANT CHANGE UP (ref 8.4–10.5)
CHLORIDE SERPL-SCNC: 101 MMOL/L — SIGNIFICANT CHANGE UP (ref 96–108)
CO2 SERPL-SCNC: 21 MMOL/L — LOW (ref 22–31)
CREAT SERPL-MCNC: 0.6 MG/DL — SIGNIFICANT CHANGE UP (ref 0.5–1.3)
GLUCOSE SERPL-MCNC: 88 MG/DL — SIGNIFICANT CHANGE UP (ref 70–99)
HCT VFR BLD CALC: 38.5 % — SIGNIFICANT CHANGE UP (ref 34.5–45)
HGB BLD-MCNC: 12.5 G/DL — SIGNIFICANT CHANGE UP (ref 11.5–15.5)
LACTATE SERPL-SCNC: 1.3 MMOL/L — SIGNIFICANT CHANGE UP (ref 0.7–2)
MCHC RBC-ENTMCNC: 29.4 PG — SIGNIFICANT CHANGE UP (ref 27–34)
MCHC RBC-ENTMCNC: 32.5 GM/DL — SIGNIFICANT CHANGE UP (ref 32–36)
MCV RBC AUTO: 90.6 FL — SIGNIFICANT CHANGE UP (ref 80–100)
PLATELET # BLD AUTO: 336 K/UL — SIGNIFICANT CHANGE UP (ref 150–400)
POTASSIUM SERPL-MCNC: 4.2 MMOL/L — SIGNIFICANT CHANGE UP (ref 3.5–5.3)
POTASSIUM SERPL-MCNC: 5.5 MMOL/L — HIGH (ref 3.5–5.3)
POTASSIUM SERPL-SCNC: 4.2 MMOL/L — SIGNIFICANT CHANGE UP (ref 3.5–5.3)
POTASSIUM SERPL-SCNC: 5.5 MMOL/L — HIGH (ref 3.5–5.3)
RBC # BLD: 4.25 M/UL — SIGNIFICANT CHANGE UP (ref 3.8–5.2)
RBC # FLD: 13.7 % — SIGNIFICANT CHANGE UP (ref 10.3–14.5)
SODIUM SERPL-SCNC: 137 MMOL/L — SIGNIFICANT CHANGE UP (ref 135–145)
WBC # BLD: 8.06 K/UL — SIGNIFICANT CHANGE UP (ref 3.8–10.5)
WBC # FLD AUTO: 8.06 K/UL — SIGNIFICANT CHANGE UP (ref 3.8–10.5)

## 2018-07-18 PROCEDURE — 93306 TTE W/DOPPLER COMPLETE: CPT | Mod: 26

## 2018-07-18 RX ADMIN — Medication 12.5 MILLIGRAM(S): at 18:14

## 2018-07-18 RX ADMIN — Medication 12.5 MILLIGRAM(S): at 05:57

## 2018-07-18 RX ADMIN — Medication 650 MILLIGRAM(S): at 18:59

## 2018-07-18 RX ADMIN — Medication 650 MILLIGRAM(S): at 07:21

## 2018-07-18 RX ADMIN — Medication 650 MILLIGRAM(S): at 19:59

## 2018-07-18 RX ADMIN — Medication 650 MILLIGRAM(S): at 08:26

## 2018-07-18 RX ADMIN — HEPARIN SODIUM 450 UNIT(S)/HR: 5000 INJECTION INTRAVENOUS; SUBCUTANEOUS at 07:19

## 2018-07-18 RX ADMIN — Medication 1 APPLICATION(S): at 05:57

## 2018-07-18 RX ADMIN — CLOPIDOGREL BISULFATE 75 MILLIGRAM(S): 75 TABLET, FILM COATED ORAL at 12:04

## 2018-07-18 NOTE — PROVIDER CONTACT NOTE (OTHER) - BACKGROUND
Pt came in with SVT s/p cardioversion in the ER. Pt has a hx of HTN, basal cell carcinoma and stroke.

## 2018-07-18 NOTE — DISCHARGE NOTE ADULT - CARE PLAN
Principal Discharge DX:	SVT (supraventricular tachycardia)  Goal:	stable  Assessment and plan of treatment:	s/p cardioversion in ER.   cleared by cardiology for d/c   no events on tele.  Secondary Diagnosis:	Hypertension  Goal:	stable  Assessment and plan of treatment:	Follow up with your medical doctor to establish long term blood pressure treatment goals.  Secondary Diagnosis:	Borderline diabetes  Goal:	stable  Assessment and plan of treatment:	c/w current regime Principal Discharge DX:	SVT (supraventricular tachycardia)  Goal:	stable  Assessment and plan of treatment:	s/p cardioversion in ER with return to normal sinus rhythm   Cleared by cardiology for d/c   No further events on telemetry monitoring  Secondary Diagnosis:	Hypertension  Goal:	stable  Assessment and plan of treatment:	Continue taking your blood pressure medication as prescribed.   Follow up with your medical doctor to establish long term blood pressure treatment goals.  Secondary Diagnosis:	Borderline diabetes  Goal:	stable  Assessment and plan of treatment:	Continue current regimen

## 2018-07-18 NOTE — PROGRESS NOTE ADULT - ASSESSMENT
pt  admitted with  sob,  in  er , pt was  hypotensive./ clammy,    , noted  with  rapid  svt in er, treated  by  er/ s.p  cardioversion  doing much betetr now  pt  alert, oriented to place/ time  h/o  cva, htn.  dm 2  cxr normal  positive  HsTtoponin,,  no  acute  ischemic  changes on ekg,  elevated bnp  echo, pending  dm,  follow fs  no  ab, per  ID  dvt  ppx  pt is dnr from n home  scc of face  pt  eval

## 2018-07-18 NOTE — DISCHARGE NOTE ADULT - PATIENT PORTAL LINK FT
You can access the [facility] Patient Portal, offered by [facility], by registering with the following website: [email or web address]

## 2018-07-18 NOTE — DISCHARGE NOTE ADULT - HOSPITAL COURSE
88F with prior CVA (7/2015 with residual R sided weakness/pain), DM, HTN, BCC on face, p/w shortness of breath from nursing home. Pt has been tachypneic w/ retractions over the last two hours, placed on NC 3 L and transferred to ED. Pt in SVT, HR 160s, and hypotensive s/p DCCV in ER. Now NSR but still low BP. 88F with prior CVA (7/2015 with residual R sided weakness/pain), DM, HTN, BCC on face, p/w shortness of breath from nursing home. Pt has been tachypneic w/ retractions over the last two hours, placed on NC 3 L and transferred to ED. Pt in SVT, HR 160s, and hypotensive s/p DCCV in ER. Now NSR. BP has been elevated and Metoprolol has been increased appropriately. Echocardiogram did not reveal any acute findings that require intervention at this time. Stable to return to long term residence facility.

## 2018-07-18 NOTE — DISCHARGE NOTE ADULT - MEDICATION SUMMARY - MEDICATIONS TO TAKE
I will START or STAY ON the medications listed below when I get home from the hospital:    Januvia 50 mg oral tablet  -- 1 tab(s) by mouth once a day  -- Indication: For Borderline diabetes    clopidogrel 75 mg oral tablet  -- 1 tab(s) by mouth once a day  -- Indication: For Hypertension    metoprolol tartrate 25 mg oral tablet  -- 1 tab(s) by mouth 2 times a day  -- Indication: For Hypertension    Hydrocort 2.5% topical cream  -- Apply on skin to affected area 2 times a day - to right cheek  -- Indication: For BCC    Zantac 300 oral tablet  -- 1 tab(s) by mouth once a day (at bedtime)  -- Indication: For Stomach protection     senna oral tablet  -- 2 tab(s) by mouth once a day (at bedtime)  -- Indication: For Constipation

## 2018-07-18 NOTE — PROVIDER CONTACT NOTE (OTHER) - ASSESSMENT
Pt A&Ox3. DNR/DNI. Tmp 97.5, hr 81, bp 164/95, respirations 18 and 98% on RA. Pt is having no c/o chest pain, SOB, palpitations, and discomfort. Pt is on metoprolol 12.5mg PO.

## 2018-07-18 NOTE — DISCHARGE NOTE ADULT - PLAN OF CARE
stable s/p cardioversion in ER.   cleared by cardiology for d/c   no events on tele. Follow up with your medical doctor to establish long term blood pressure treatment goals. c/w current regime s/p cardioversion in ER with return to normal sinus rhythm   Cleared by cardiology for d/c   No further events on telemetry monitoring Continue taking your blood pressure medication as prescribed.   Follow up with your medical doctor to establish long term blood pressure treatment goals. Continue current regimen

## 2018-07-19 VITALS
RESPIRATION RATE: 18 BRPM | SYSTOLIC BLOOD PRESSURE: 149 MMHG | HEART RATE: 71 BPM | DIASTOLIC BLOOD PRESSURE: 71 MMHG | OXYGEN SATURATION: 98 % | TEMPERATURE: 98 F

## 2018-07-19 LAB
ANION GAP SERPL CALC-SCNC: 15 MMOL/L — SIGNIFICANT CHANGE UP (ref 5–17)
BUN SERPL-MCNC: 9 MG/DL — SIGNIFICANT CHANGE UP (ref 7–23)
CALCIUM SERPL-MCNC: 8.6 MG/DL — SIGNIFICANT CHANGE UP (ref 8.4–10.5)
CHLORIDE SERPL-SCNC: 102 MMOL/L — SIGNIFICANT CHANGE UP (ref 96–108)
CO2 SERPL-SCNC: 23 MMOL/L — SIGNIFICANT CHANGE UP (ref 22–31)
CREAT SERPL-MCNC: 0.65 MG/DL — SIGNIFICANT CHANGE UP (ref 0.5–1.3)
GLUCOSE SERPL-MCNC: 118 MG/DL — HIGH (ref 70–99)
HCT VFR BLD CALC: 39.1 % — SIGNIFICANT CHANGE UP (ref 34.5–45)
HGB BLD-MCNC: 13.1 G/DL — SIGNIFICANT CHANGE UP (ref 11.5–15.5)
MCHC RBC-ENTMCNC: 31 PG — SIGNIFICANT CHANGE UP (ref 27–34)
MCHC RBC-ENTMCNC: 33.5 GM/DL — SIGNIFICANT CHANGE UP (ref 32–36)
MCV RBC AUTO: 92.5 FL — SIGNIFICANT CHANGE UP (ref 80–100)
PLATELET # BLD AUTO: 349 K/UL — SIGNIFICANT CHANGE UP (ref 150–400)
POTASSIUM SERPL-MCNC: 3.8 MMOL/L — SIGNIFICANT CHANGE UP (ref 3.5–5.3)
POTASSIUM SERPL-SCNC: 3.8 MMOL/L — SIGNIFICANT CHANGE UP (ref 3.5–5.3)
RBC # BLD: 4.23 M/UL — SIGNIFICANT CHANGE UP (ref 3.8–5.2)
RBC # FLD: 12.3 % — SIGNIFICANT CHANGE UP (ref 10.3–14.5)
SODIUM SERPL-SCNC: 140 MMOL/L — SIGNIFICANT CHANGE UP (ref 135–145)
WBC # BLD: 7.9 K/UL — SIGNIFICANT CHANGE UP (ref 3.8–10.5)
WBC # FLD AUTO: 7.9 K/UL — SIGNIFICANT CHANGE UP (ref 3.8–10.5)

## 2018-07-19 PROCEDURE — 85027 COMPLETE CBC AUTOMATED: CPT

## 2018-07-19 PROCEDURE — 96375 TX/PRO/DX INJ NEW DRUG ADDON: CPT | Mod: XU

## 2018-07-19 PROCEDURE — 93005 ELECTROCARDIOGRAM TRACING: CPT | Mod: XU

## 2018-07-19 PROCEDURE — 82550 ASSAY OF CK (CPK): CPT

## 2018-07-19 PROCEDURE — 83880 ASSAY OF NATRIURETIC PEPTIDE: CPT

## 2018-07-19 PROCEDURE — 96374 THER/PROPH/DIAG INJ IV PUSH: CPT | Mod: XU

## 2018-07-19 PROCEDURE — 85014 HEMATOCRIT: CPT

## 2018-07-19 PROCEDURE — 85610 PROTHROMBIN TIME: CPT

## 2018-07-19 PROCEDURE — 82947 ASSAY GLUCOSE BLOOD QUANT: CPT

## 2018-07-19 PROCEDURE — 84132 ASSAY OF SERUM POTASSIUM: CPT

## 2018-07-19 PROCEDURE — 87086 URINE CULTURE/COLONY COUNT: CPT

## 2018-07-19 PROCEDURE — 83605 ASSAY OF LACTIC ACID: CPT

## 2018-07-19 PROCEDURE — 97161 PT EVAL LOW COMPLEX 20 MIN: CPT

## 2018-07-19 PROCEDURE — 84295 ASSAY OF SERUM SODIUM: CPT

## 2018-07-19 PROCEDURE — 51701 INSERT BLADDER CATHETER: CPT

## 2018-07-19 PROCEDURE — 93306 TTE W/DOPPLER COMPLETE: CPT

## 2018-07-19 PROCEDURE — 81001 URINALYSIS AUTO W/SCOPE: CPT

## 2018-07-19 PROCEDURE — 80048 BASIC METABOLIC PNL TOTAL CA: CPT

## 2018-07-19 PROCEDURE — 82553 CREATINE MB FRACTION: CPT

## 2018-07-19 PROCEDURE — 85730 THROMBOPLASTIN TIME PARTIAL: CPT

## 2018-07-19 PROCEDURE — 71045 X-RAY EXAM CHEST 1 VIEW: CPT

## 2018-07-19 PROCEDURE — 84484 ASSAY OF TROPONIN QUANT: CPT

## 2018-07-19 PROCEDURE — 80053 COMPREHEN METABOLIC PANEL: CPT

## 2018-07-19 PROCEDURE — 82803 BLOOD GASES ANY COMBINATION: CPT

## 2018-07-19 PROCEDURE — 99285 EMERGENCY DEPT VISIT HI MDM: CPT | Mod: 25

## 2018-07-19 PROCEDURE — 82330 ASSAY OF CALCIUM: CPT

## 2018-07-19 PROCEDURE — 93308 TTE F-UP OR LMTD: CPT

## 2018-07-19 PROCEDURE — 82435 ASSAY OF BLOOD CHLORIDE: CPT

## 2018-07-19 RX ORDER — METOPROLOL TARTRATE 50 MG
1 TABLET ORAL
Qty: 60 | Refills: 0 | OUTPATIENT
Start: 2018-07-19 | End: 2018-08-17

## 2018-07-19 RX ORDER — METOPROLOL TARTRATE 50 MG
25 TABLET ORAL
Qty: 0 | Refills: 0 | Status: DISCONTINUED | OUTPATIENT
Start: 2018-07-19 | End: 2018-07-19

## 2018-07-19 RX ADMIN — Medication 650 MILLIGRAM(S): at 04:57

## 2018-07-19 RX ADMIN — Medication 650 MILLIGRAM(S): at 05:57

## 2018-07-19 RX ADMIN — CLOPIDOGREL BISULFATE 75 MILLIGRAM(S): 75 TABLET, FILM COATED ORAL at 12:33

## 2018-07-19 RX ADMIN — Medication 12.5 MILLIGRAM(S): at 05:02

## 2018-07-19 NOTE — PROGRESS NOTE ADULT - SUBJECTIVE AND OBJECTIVE BOX
- Patient seen and examined.  - In summary, patient is a 88 year old woman who presented with svt. (2018 13:56)  - Today, patient is without complaints.         *****MEDICATIONS:    MEDICATIONS  (STANDING):  clopidogrel Tablet 75 milliGRAM(s) Oral daily  hydrocortisone 2.5% Cream 1 Application(s) Topical two times a day  metoprolol tartrate      metoprolol tartrate 12.5 milliGRAM(s) Oral two times a day    MEDICATIONS  (PRN):  acetaminophen   Tablet. 650 milliGRAM(s) Oral every 6 hours PRN Mild Pain (1 - 3)             ***** REVIEW OF SYSTEM:  GEN: no fever, no chills, no pain  RESP: no SOB, no cough, no sputum  CVS: no chest pain, no palpitations, no edema  GI: no abdominal pain, no nausea, no vomiting, no constipation, no diarrhea  : no dysuria, no frequency  NEURO: no headache, no dizziness  PSYCH: no depression, not anxious  Derm : no itching, no rash         ***** VITAL SIGNS:    T(F): 97.8 (18 @ 04:39), Max: 98.1 (18 @ 12:35)  HR: 93 (18 @ 05:55) (71 - 93)  BP: 169/84 (18 @ 05:55) (138/84 - 176/79)  RR: 18 (18 @ 04:39) (17 - 18)  SpO2: 97% (18 @ 04:39) (93% - 97%)  Wt(kg): --  ,   I&O's Summary    2018 07:01  -  2018 07:00  --------------------------------------------------------  IN: 555 mL / OUT: 0 mL / NET: 555 mL                   *****PHYSICAL EXAM:  GEN: A&O X 3 , NAD , comfortable  HEENT: NCAT, EOMI, MMM, no icterus  NECK: Supple, No JVD  CVS: S1S2 , regular , No M/R/G appreciated  PULM: CTA B/L,  no W/R/R appreciated  ABD.: soft. non tender, non distended,  bowel sounds present  Extrem: intact pulses , no edema noted  Derm: No rash or ecchymosis noted  PSYCH: normal mood, no depression, not anxious         *****LAB AND IMAGIN.5   8.06  )-----------( 336      ( 2018 08:20 )             38.5               07-18    x   |  x   |  x   ----------------------------<  x   4.2   |  x   |  x     Ca    8.5      2018 06:44    TPro  6.4  /  Alb  3.4  /  TBili  0.6  /  DBili  x   /  AST  42<H>  /  ALT  19  /  AlkPhos  54  07-16    PT/INR - ( 2018 10:52 )   PT: 11.2 sec;   INR: 1.03 ratio         PTT - ( 2018 06:44 )  PTT:60.6 sec       CARDIAC MARKERS ( 2018 14:54 )  x     / x     / 164 U/L / x     / 10.6 ng/mL        [All pertinent recent Imaging/Reports reviewed]         *****A S S E S S M E N T   A N D   P L A N :    88F with SOB, svt  ID eval noted  afeb off abx  no further events no tele monitoring  await echo  cont beta blocker  dcp after echo  anticipate only conservative tx    __________________________  KEIRY Gillespie D.O.
- Patient seen and examined.  - In summary, patient is a 88 year old woman who presented with svt. (2018 13:56)  - Today, patient is without complaints.         *****MEDICATIONS:    MEDICATIONS  (STANDING):  clopidogrel Tablet 75 milliGRAM(s) Oral daily  heparin  Infusion. 450 Unit(s)/Hr (4.5 mL/Hr) IV Continuous <Continuous>  metoprolol succinate ER 25 milliGRAM(s) Oral daily    MEDICATIONS  (PRN):  heparin  Injectable 2900 Unit(s) IV Push every 6 hours PRN For aPTT less than 40           ***** REVIEW OF SYSTEM:  GEN: no fever, no chills, no pain  RESP: no SOB, no cough, no sputum  CVS: no chest pain, no palpitations, no edema  GI: no abdominal pain, no nausea, no vomiting, no constipation, no diarrhea  : no dysuria, no frequency  NEURO: no headache, no dizziness  PSYCH: no depression, not anxious  Derm : no itching, no rash         ***** VITAL SIGNS:  T(F): 97.6 (18 @ 04:40), Max: 98.4 (18 @ 09:46)  HR: 77 (18 @ 04:40) (77 - 166)  BP: 154/81 (18 @ 04:40) (5/51 - 154/81)  RR: 18 (18 @ 04:40) (17 - 42)  SpO2: 96% (18 @ 04:40) (95% - 100%)  Wt(kg): --  ,   I&O's Summary    2018 07:01  -  2018 07:00  --------------------------------------------------------  IN: 571.5 mL / OUT: 0 mL / NET: 571.5 mL             *****PHYSICAL EXAM:  GEN: A&O X 3 , NAD , comfortable  HEENT: NCAT, EOMI, MMM, no icterus  NECK: Supple, No JVD  CVS: S1S2 , regular , No M/R/G appreciated  PULM: CTA B/L,  no W/R/R appreciated  ABD.: soft. non tender, non distended,  bowel sounds present  Extrem: intact pulses , no edema noted  Derm: No rash or ecchymosis noted  PSYCH: normal mood, no depression, not anxious         *****LAB AND IMAGING:                        10.4   9.98  )-----------( 302      ( 2018 05:34 )             33.3               07-17    141  |  104  |  16  ----------------------------<  85  4.5   |  23  |  0.85    Ca    8.5      2018 03:33    TPro  6.4  /  Alb  3.4  /  TBili  0.6  /  DBili  x   /  AST  42<H>  /  ALT  19  /  AlkPhos  54  07-16    PT/INR - ( 2018 10:52 )   PT: 11.2 sec;   INR: 1.03 ratio         PTT - ( 2018 03:32 )  PTT:91.1 sec       CARDIAC MARKERS ( 2018 14:54 )  x     / x     / 164 U/L / x     / 10.6 ng/mL              Urinalysis Basic - ( 2018 13:05 )    Color: Yellow / Appearance: Clear / S.015 / pH: x  Gluc: x / Ketone: Trace  / Bili: Negative / Urobili: Negative   Blood: x / Protein: 100 mg/dL / Nitrite: Negative   Leuk Esterase: Small / RBC: 3-5 /HPF / WBC 6-10 /HPF   Sq Epi: x / Non Sq Epi: OCC /HPF / Bacteria: x      [All pertinent recent Imaging/Reports reviewed]         *****A S S E S S M E N T   A N D   P L A N :    88F with SOB, svt  ID eval noted  afeb off abx  no further events no tele monitoring  check echo  start beta blocker    __________________________  KEIRY Gillespie D.O.
- Patient seen and examined.  - In summary, patient is a 88 year old woman who presented with svt. (2018 13:56)  - Today, patient is without complaints.         *****MEDICATIONS:    MEDICATIONS  (STANDING):  clopidogrel Tablet 75 milliGRAM(s) Oral daily  metoprolol tartrate      metoprolol tartrate 12.5 milliGRAM(s) Oral two times a day    MEDICATIONS  (PRN):  acetaminophen   Tablet. 650 milliGRAM(s) Oral every 6 hours PRN Mild Pain (1 - 3)             ***** REVIEW OF SYSTEM:  GEN: no fever, no chills, no pain  RESP: no SOB, no cough, no sputum  CVS: no chest pain, no palpitations, no edema  GI: no abdominal pain, no nausea, no vomiting, no constipation, no diarrhea  : no dysuria, no frequency  NEURO: no headache, no dizziness  PSYCH: no depression, not anxious  Derm : no itching, no rash         ***** VITAL SIGNS:    T(F): 97.8 (18 @ 04:38), Max: 98.4 (18 @ 11:45)  HR: 81 (18 @ 04:38) (64 - 84)  BP: 130/70 (18 @ 06:50) (125/79 - 164/95)  RR: 18 (18 @ 04:38) (18 - 18)  SpO2: 98% (18 @ 04:38) (95% - 98%)  Wt(kg): --  ,   I&O's Summary    2018 07:01  -  2018 07:00  --------------------------------------------------------  IN: 660 mL / OUT: 0 mL / NET: 660 mL                 *****PHYSICAL EXAM:  GEN: A&O X 3 , NAD , comfortable  HEENT: NCAT, EOMI, MMM, no icterus  NECK: Supple, No JVD  CVS: S1S2 , regular , No M/R/G appreciated  PULM: CTA B/L,  no W/R/R appreciated  ABD.: soft. non tender, non distended,  bowel sounds present  Extrem: intact pulses , no edema noted  Derm: No rash or ecchymosis noted  PSYCH: normal mood, no depression, not anxious         *****LAB AND IMAGIN.1   7.9   )-----------( 349      ( 2018 05:51 )             39.1               07-    140  |  102  |  9   ----------------------------<  118<H>  3.8   |  23  |  0.65    Ca    8.6      2018 05:51  PTT - ( 2018 06:44 )  PTT:60.6 sec         [All pertinent recent Imaging/Reports reviewed]  < from: Transthoracic Echocardiogram (18 @ 14:32) >  Conclusions:  1. Moderate-severe mitral regurgitation.  2. Mildly dilated left atrium.  LA volume index = 39 cc/m2.  3. Concentric left ventricular hypertrophy.  4. Hyperdynamic left ventricular systolic function.  5. Normal tricuspid valve. Mild tricuspid regurgitation.  6. Estimated pulmonary artery systolic pressure equals 47  mm Hg, assuming right atrial pressure equals 8 mm Hg,  consistent with mild pulmonary pressures.  7. Small pericardial effusion.  *** Compared with echocardiogram of 2017, no  significant changes noted.    < end of copied text >         *****A S S E S S M E N T   A N D   P L A N :    88F with SOB, svt  ID eval noted  afeb off abx  no further events no tele monitoring  echo noted, unchanged  cont beta blocker  dcp     __________________________  KEIRY Gillespie D.O.
CC: f/u for leukocytosis    Patient reports no specific complaints    REVIEW OF SYSTEMS:  All other review of systems negative (Comprehensive ROS)    Antimicrobials Day #  :off    Other Medications Reviewed    T(F): 98.1 (18 @ 12:35), Max: 98.1 (18 @ 12:35)  HR: 89 (18 @ 12:35)  BP: 138/84 (18 @ 12:35)  RR: 17 (18 @ 12:35)  SpO2: 97% (18 @ 12:35)  Wt(kg): --    PHYSICAL EXAM:  General: alert, no acute distress  Eyes:  anicteric, no conjunctival injection, no discharge  Oropharynx: Rt facial lesion is dry and without cellulitis	  Neck: supple, without adenopathy  Lungs: clear to auscultation  Heart: regular rate and rhythm; no murmur, rubs or gallops  Abdomen: soft, nondistended, nontender, without mass or organomegaly  Skin: no lesions  Extremities: no clubbing, cyanosis, or edema  Neurologic: alert, oriented, moves all extremities    LAB RESULTS:                        10.4   9.98  )-----------( 302      ( 2018 05:34 )             33.3     07-17    141  |  104  |  16  ----------------------------<  85  4.5   |  23  |  0.85    Ca    8.5      2018 03:33    TPro  6.4  /  Alb  3.4  /  TBili  0.6  /  DBili  x   /  AST  42<H>  /  ALT  19  /  AlkPhos  54  07-16    LIVER FUNCTIONS - ( 2018 10:52 )  Alb: 3.4 g/dL / Pro: 6.4 g/dL / ALK PHOS: 54 U/L / ALT: 19 U/L / AST: 42 U/L / GGT: x           Urinalysis Basic - ( 2018 13:05 )    Color: Yellow / Appearance: Clear / S.015 / pH: x  Gluc: x / Ketone: Trace  / Bili: Negative / Urobili: Negative   Blood: x / Protein: 100 mg/dL / Nitrite: Negative   Leuk Esterase: Small / RBC: 3-5 /HPF / WBC 6-10 /HPF   Sq Epi: x / Non Sq Epi: OCC /HPF / Bacteria: x      MICROBIOLOGY:  RECENT CULTURES:  blood cultures are pending    RADIOLOGY REVIEWED:  < from: Xray Chest 1 View- PORTABLE-Urgent (18 @ 11:09) >  INTERPRETATION:  A single chest x-ray was obtained on 2018.    Indication: Shortness of breath.    Impression:    The heart is normal in size. The lungs are clear. The bones are slightly   demineralized. Degenerative changes of the thoracic spine.    < end of copied text >
CC: f/u for leukocytosis    Patient reports nothing intelligible    REVIEW OF SYSTEMS:  All other review of systems negative (Comprehensive ROS) cannot get    Antimicrobials Day #  :    Other Medications Reviewed    T(F): 97.8 (07-19-18 @ 12:03), Max: 98.3 (07-18-18 @ 20:03)  HR: 74 (07-19-18 @ 12:03)  BP: 171/77 (07-19-18 @ 12:03)  RR: 18 (07-19-18 @ 12:03)  SpO2: 97% (07-19-18 @ 12:03)  Wt(kg): --    PHYSICAL EXAM:  General: alert, no acute distress  Eyes:  anicteric, no conjunctival injection, no discharge  Oropharynx large eschar on face on right	  Neck: supple, without adenopathy  Lungs: not cooperative  to auscultation  Heart: s1s2 2/6 sys m  Abdomen: soft, nondistended, nontender, without mass or organomegaly  Skin: scars  Extremities: no clubbing, cyanosis, or edema  Neurologic: confused moves all extremities    LAB RESULTS:                        13.1   7.9   )-----------( 349      ( 19 Jul 2018 05:51 )             39.1     07-19    140  |  102  |  9   ----------------------------<  118<H>  3.8   |  23  |  0.65    Ca    8.6      19 Jul 2018 05:51          MICROBIOLOGY:  RECENT CULTURES:  07-16 @ 16:30 .Urine Clean Catch (Midstream)     <10,000 CFU/ml Normal Urogenital irene present          RADIOLOGY REVIEWED:    < from: Xray Chest 1 View- PORTABLE-Urgent (07.16.18 @ 11:09) >  Impression:    The heart is normal in size. The lungs are clear. The bones are slightly   demineralized. Degenerative changes of the thoracic spine.        Assessment:  Patient admitted with rapid svt, hypotension and diaphoresis with initial leukocytosis now resolved. No infection is apparent and she is stable off abx  Plan:  monitor off abx  call if further input is needed
no  compalints  REVIEW OF SYSTEMS:  GEN: no fever,    no chills  RESP: no SOB,   no cough  CVS: no chest pain,   no palpitations  GI: no abdominal pain,   no nausea,   no vomiting,   no constipation,   no diarrhea  : no dysuria,   no frequency  NEURO: no headache,   no dizziness  PSYCH: no depression,   not anxious  Derm : no rash    MEDICATIONS  (STANDING):  clopidogrel Tablet 75 milliGRAM(s) Oral daily  metoprolol tartrate      metoprolol tartrate 12.5 milliGRAM(s) Oral two times a day    MEDICATIONS  (PRN):  acetaminophen   Tablet. 650 milliGRAM(s) Oral every 6 hours PRN Mild Pain (1 - 3)      Vital Signs Last 24 Hrs  T(C): 36.6 (19 Jul 2018 04:38), Max: 36.9 (18 Jul 2018 11:45)  T(F): 97.8 (19 Jul 2018 04:38), Max: 98.4 (18 Jul 2018 11:45)  HR: 81 (19 Jul 2018 04:38) (64 - 84)  BP: 130/70 (19 Jul 2018 06:50) (125/79 - 164/95)  BP(mean): --  RR: 18 (19 Jul 2018 04:38) (18 - 18)  SpO2: 98% (19 Jul 2018 04:38) (95% - 98%)  CAPILLARY BLOOD GLUCOSE        I&O's Summary    18 Jul 2018 07:01  -  19 Jul 2018 07:00  --------------------------------------------------------  IN: 660 mL / OUT: 0 mL / NET: 660 mL        PHYSICAL EXAM:  HEAD:  Atraumatic, Normocephalic  NECK: Supple, No   JVD  CHEST/LUNG:   no     rales,     no,    rhonchi  HEART: Regular rate and rhythm;         murmur  ABDOMEN: Soft, Nontender, ;   EXTREMITIES:        edema  NEUROLOGY:  alert    LABS:                        13.1   7.9   )-----------( 349      ( 19 Jul 2018 05:51 )             39.1     07-19    140  |  102  |  9   ----------------------------<  118<H>  3.8   |  23  |  0.65    Ca    8.6      19 Jul 2018 05:51      PTT - ( 18 Jul 2018 06:44 )  PTT:60.6 sec        Lactate, Blood: 1.3 mmol/L (07-18 @ 09:05)                  Consultant(s) Notes Reviewed:      Care Discussed with Consultants/Other Providers:
no  cp/sob  tele, nsr    REVIEW OF SYSTEMS:  GEN: no fever,    no chills  RESP: no SOB,   no cough  CVS: no chest pain,   no palpitations  GI: no abdominal pain,   no nausea,   no vomiting,   no constipation,   no diarrhea  : no dysuria,   no frequency  NEURO: no headache,   no dizziness  PSYCH: no depression,   not anxious  Derm : no rash    MEDICATIONS  (STANDING):  clopidogrel Tablet 75 milliGRAM(s) Oral daily  hydrocortisone 2.5% Cream 1 Application(s) Topical two times a day  metoprolol tartrate      metoprolol tartrate 12.5 milliGRAM(s) Oral two times a day    MEDICATIONS  (PRN):  acetaminophen   Tablet. 650 milliGRAM(s) Oral every 6 hours PRN Mild Pain (1 - 3)      Vital Signs Last 24 Hrs  T(C): 36.6 (2018 04:39), Max: 36.7 (2018 12:35)  T(F): 97.8 (2018 04:39), Max: 98.1 (2018 12:35)  HR: 93 (2018 05:55) (71 - 93)  BP: 169/84 (2018 05:55) (138/84 - 176/79)  BP(mean): --  RR: 18 (2018 04:39) (17 - 18)  SpO2: 97% (2018 04:39) (93% - 97%)  CAPILLARY BLOOD GLUCOSE        I&O's Summary    2018 07:01  -  2018 07:00  --------------------------------------------------------  IN: 555 mL / OUT: 0 mL / NET: 555 mL        PHYSICAL EXAM:  HEAD:  Atraumatic, Normocephalic  NECK: Supple, No   JVD  CHEST/LUNG:   no     rales,     no,    rhonchi  HEART: Regular rate and rhythm;         murmur  ABDOMEN: Soft, Nontender, ;   EXTREMITIES:    no  edema  NEUROLOGY:  alert,  scc face    LABS:                        12.5   8.06  )-----------( 336      ( 2018 08:20 )             38.5         137  |  101  |  11  ----------------------------<  88  5.5<H>   |  21<L>  |  0.60    Ca    8.5      2018 06:44    TPro  6.4  /  Alb  3.4  /  TBili  0.6  /  DBili  x   /  AST  42<H>  /  ALT  19  /  AlkPhos  54  07-16    PT/INR - ( 2018 10:52 )   PT: 11.2 sec;   INR: 1.03 ratio         PTT - ( 2018 06:44 )  PTT:60.6 sec  CARDIAC MARKERS ( 2018 14:54 )  x     / x     / 164 U/L / x     / 10.6 ng/mL      Urinalysis Basic - ( 2018 13:05 )    Color: Yellow / Appearance: Clear / S.015 / pH: x  Gluc: x / Ketone: Trace  / Bili: Negative / Urobili: Negative   Blood: x / Protein: 100 mg/dL / Nitrite: Negative   Leuk Esterase: Small / RBC: 3-5 /HPF / WBC 6-10 /HPF   Sq Epi: x / Non Sq Epi: OCC /HPF / Bacteria: x          07-16 @ 10:52  4.0  75              Consultant(s) Notes Reviewed:      Care Discussed with Consultants/Other Providers:
no  cp/sob, tele,  nsr    REVIEW OF SYSTEMS:  GEN: no fever,    no chills  RESP: no SOB,   no cough  CVS: no chest pain,   no palpitations  GI: no abdominal pain,   no nausea,   no vomiting,   no constipation,   no diarrhea  : no dysuria,   no frequency  NEURO: no headache,   no dizziness  PSYCH: no depression,   not anxious  Derm : no rash    MEDICATIONS  (STANDING):  clopidogrel Tablet 75 milliGRAM(s) Oral daily  heparin  Infusion. 450 Unit(s)/Hr (4.5 mL/Hr) IV Continuous <Continuous>  metoprolol succinate ER 25 milliGRAM(s) Oral daily    MEDICATIONS  (PRN):  heparin  Injectable 2900 Unit(s) IV Push every 6 hours PRN For aPTT less than 40      Vital Signs Last 24 Hrs  T(C): 36.4 (2018 04:40), Max: 36.9 (2018 09:46)  T(F): 97.6 (2018 04:40), Max: 98.4 (2018 09:46)  HR: 77 (2018 04:40) (77 - 166)  BP: 154/81 (2018 04:40) (5/51 - 154/81)  BP(mean): --  RR: 18 (2018 04:40) (17 - 42)  SpO2: 96% (2018 04:40) (95% - 100%)  CAPILLARY BLOOD GLUCOSE        I&O's Summary    2018 07:01  -  2018 07:00  --------------------------------------------------------  IN: 571.5 mL / OUT: 0 mL / NET: 571.5 mL        PHYSICAL EXAM:  HEAD:  Atraumatic, Normocephalic  NECK: Supple, No   JVD  CHEST/LUNG:   no     rales,     no,    rhonchi  HEART: Regular rate and rhythm;         murmur  ABDOMEN: Soft, Nontender, ;   EXTREMITIES:        edema  NEUROLOGY:  alert    LABS:                        10.4   9.98  )-----------( 302      ( 2018 05:34 )             33.3     07-    141  |  104  |  16  ----------------------------<  85  4.5   |  23  |  0.85    Ca    8.5      2018 03:33    TPro  6.4  /  Alb  3.4  /  TBili  0.6  /  DBili  x   /  AST  42<H>  /  ALT  19  /  AlkPhos  54  07-16    PT/INR - ( 2018 10:52 )   PT: 11.2 sec;   INR: 1.03 ratio         PTT - ( 2018 03:32 )  PTT:91.1 sec  CARDIAC MARKERS ( 2018 14:54 )  x     / x     / 164 U/L / x     / 10.6 ng/mL      Urinalysis Basic - ( 2018 13:05 )    Color: Yellow / Appearance: Clear / S.015 / pH: x  Gluc: x / Ketone: Trace  / Bili: Negative / Urobili: Negative   Blood: x / Protein: 100 mg/dL / Nitrite: Negative   Leuk Esterase: Small / RBC: 3-5 /HPF / WBC 6-10 /HPF   Sq Epi: x / Non Sq Epi: OCC /HPF / Bacteria: x          07-16 @ 10:52  4.0  75              Consultant(s) Notes Reviewed:      Care Discussed with Consultants/Other Providers:

## 2018-07-19 NOTE — PHYSICAL THERAPY INITIAL EVALUATION ADULT - ACTIVE RANGE OF MOTION EXAMINATION, REHAB EVAL
bilateral upper extremity Active ROM was WFL (within functional limits)/B ankles w/ decreased dorsiflexion/bilateral  lower extremity Active ROM was WFL (within functional limits)

## 2018-07-19 NOTE — PROGRESS NOTE ADULT - ASSESSMENT
pt  admitted with  sob,  in  er , pt was  hypotensive./ clammy,    , noted  with  rapid  svt in er, treated  by  er/ s.p  cardioversion  doing much betetr now  pt  alert, oriented to place/ time  h/o  cva, htn.  dm 2  cxr normal  positive  HsTtoponin,,  no  acute  ischemic  changes on ekg,  elevated bnp  echo, pending  dm,  follow fs  no  ab, per  ID  dvt  ppx  pt is dnr from n home  scc of face  pt  eval/ disposition

## 2018-07-19 NOTE — PHYSICAL THERAPY INITIAL EVALUATION ADULT - PERTINENT HX OF CURRENT PROBLEM, REHAB EVAL
88F w/ prior CVA (7/2015 with residual R sided weakness/pain), DM, HTN, BCC on face, p/w shortness of breath from nursing home. Pt has been tachypneic w/ retractions over the last 2hrs, placed on NC 3 L & transferred to ED. Pt in SVT, HR 160s, & hypotensive s/p DCCV in ER. Now NSR but still low BP.

## 2018-07-19 NOTE — PHYSICAL THERAPY INITIAL EVALUATION ADULT - DISCHARGE DISPOSITION, PT EVAL
return to LTC facility w/ previous level of assist for all ADL's & functional mobility.. wound benefit from Home PT to maintain current level of function

## 2018-08-15 ENCOUNTER — APPOINTMENT (OUTPATIENT)
Dept: DERMATOLOGY | Facility: CLINIC | Age: 83
End: 2018-08-15

## 2018-11-01 ENCOUNTER — OUTPATIENT (OUTPATIENT)
Dept: OUTPATIENT SERVICES | Facility: HOSPITAL | Age: 83
LOS: 1 days | End: 2018-11-01
Payer: MEDICARE

## 2018-11-01 DIAGNOSIS — N20.0 CALCULUS OF KIDNEY: Chronic | ICD-10-CM

## 2018-11-01 DIAGNOSIS — Z98.890 OTHER SPECIFIED POSTPROCEDURAL STATES: Chronic | ICD-10-CM

## 2018-11-01 PROCEDURE — G9001: CPT

## 2018-11-25 ENCOUNTER — EMERGENCY (EMERGENCY)
Facility: HOSPITAL | Age: 83
LOS: 1 days | Discharge: HOME | End: 2018-11-25
Attending: EMERGENCY MEDICINE
Payer: MEDICARE

## 2018-11-25 DIAGNOSIS — Z98.890 OTHER SPECIFIED POSTPROCEDURAL STATES: Chronic | ICD-10-CM

## 2018-11-25 DIAGNOSIS — N20.0 CALCULUS OF KIDNEY: Chronic | ICD-10-CM

## 2018-11-25 LAB
ALBUMIN SERPL ELPH-MCNC: 3.9 G/DL — SIGNIFICANT CHANGE UP (ref 3.3–5)
ALP SERPL-CCNC: 107 U/L — SIGNIFICANT CHANGE UP (ref 40–120)
ALT FLD-CCNC: 27 U/L — SIGNIFICANT CHANGE UP (ref 10–45)
ANION GAP SERPL CALC-SCNC: 15 MMOL/L — SIGNIFICANT CHANGE UP (ref 5–17)
APTT BLD: 34.5 SEC — SIGNIFICANT CHANGE UP (ref 27.5–36.3)
AST SERPL-CCNC: 31 U/L — SIGNIFICANT CHANGE UP (ref 10–40)
BASOPHILS # BLD AUTO: 0.1 K/UL — SIGNIFICANT CHANGE UP (ref 0–0.2)
BASOPHILS NFR BLD AUTO: 0.7 % — SIGNIFICANT CHANGE UP (ref 0–2)
BILIRUB SERPL-MCNC: 0.4 MG/DL — SIGNIFICANT CHANGE UP (ref 0.2–1.2)
BUN SERPL-MCNC: 58 MG/DL — HIGH (ref 7–23)
CALCIUM SERPL-MCNC: 9.3 MG/DL — SIGNIFICANT CHANGE UP (ref 8.4–10.5)
CHLORIDE SERPL-SCNC: 99 MMOL/L — SIGNIFICANT CHANGE UP (ref 96–108)
CO2 SERPL-SCNC: 19 MMOL/L — LOW (ref 22–31)
CREAT SERPL-MCNC: 1.99 MG/DL — HIGH (ref 0.5–1.3)
EOSINOPHIL # BLD AUTO: 0.1 K/UL — SIGNIFICANT CHANGE UP (ref 0–0.5)
EOSINOPHIL NFR BLD AUTO: 1.3 % — SIGNIFICANT CHANGE UP (ref 0–6)
GLUCOSE SERPL-MCNC: 131 MG/DL — HIGH (ref 70–99)
HCT VFR BLD CALC: 36.4 % — SIGNIFICANT CHANGE UP (ref 34.5–45)
HGB BLD-MCNC: 11.9 G/DL — SIGNIFICANT CHANGE UP (ref 11.5–15.5)
INR BLD: 1.15 RATIO — SIGNIFICANT CHANGE UP (ref 0.88–1.16)
LIDOCAIN IGE QN: 74 U/L — HIGH (ref 7–60)
LYMPHOCYTES # BLD AUTO: 1.7 K/UL — SIGNIFICANT CHANGE UP (ref 1–3.3)
LYMPHOCYTES # BLD AUTO: 16.3 % — SIGNIFICANT CHANGE UP (ref 13–44)
MCHC RBC-ENTMCNC: 28.5 PG — SIGNIFICANT CHANGE UP (ref 27–34)
MCHC RBC-ENTMCNC: 32.8 GM/DL — SIGNIFICANT CHANGE UP (ref 32–36)
MCV RBC AUTO: 86.9 FL — SIGNIFICANT CHANGE UP (ref 80–100)
MONOCYTES # BLD AUTO: 1.4 K/UL — HIGH (ref 0–0.9)
MONOCYTES NFR BLD AUTO: 13.3 % — SIGNIFICANT CHANGE UP (ref 2–14)
NEUTROPHILS # BLD AUTO: 7.2 K/UL — SIGNIFICANT CHANGE UP (ref 1.8–7.4)
NEUTROPHILS NFR BLD AUTO: 68.5 % — SIGNIFICANT CHANGE UP (ref 43–77)
PLATELET # BLD AUTO: 289 K/UL — SIGNIFICANT CHANGE UP (ref 150–400)
POTASSIUM SERPL-MCNC: 5.1 MMOL/L — SIGNIFICANT CHANGE UP (ref 3.5–5.3)
POTASSIUM SERPL-SCNC: 5.1 MMOL/L — SIGNIFICANT CHANGE UP (ref 3.5–5.3)
PROT SERPL-MCNC: 7.7 G/DL — SIGNIFICANT CHANGE UP (ref 6–8.3)
PROTHROM AB SERPL-ACNC: 13.2 SEC — HIGH (ref 10–12.9)
RBC # BLD: 4.19 M/UL — SIGNIFICANT CHANGE UP (ref 3.8–5.2)
RBC # FLD: 14.2 % — SIGNIFICANT CHANGE UP (ref 10.3–14.5)
SODIUM SERPL-SCNC: 133 MMOL/L — LOW (ref 135–145)
WBC # BLD: 10.5 K/UL — SIGNIFICANT CHANGE UP (ref 3.8–10.5)
WBC # FLD AUTO: 10.5 K/UL — SIGNIFICANT CHANGE UP (ref 3.8–10.5)

## 2018-11-25 PROCEDURE — G0168: CPT | Mod: GC

## 2018-11-25 PROCEDURE — 70486 CT MAXILLOFACIAL W/O DYE: CPT

## 2018-11-25 PROCEDURE — 70450 CT HEAD/BRAIN W/O DYE: CPT

## 2018-11-25 PROCEDURE — 72125 CT NECK SPINE W/O DYE: CPT | Mod: 26

## 2018-11-25 PROCEDURE — 86850 RBC ANTIBODY SCREEN: CPT

## 2018-11-25 PROCEDURE — 71045 X-RAY EXAM CHEST 1 VIEW: CPT

## 2018-11-25 PROCEDURE — 86900 BLOOD TYPING SEROLOGIC ABO: CPT

## 2018-11-25 PROCEDURE — 72125 CT NECK SPINE W/O DYE: CPT

## 2018-11-25 PROCEDURE — 72170 X-RAY EXAM OF PELVIS: CPT

## 2018-11-25 PROCEDURE — 86901 BLOOD TYPING SEROLOGIC RH(D): CPT

## 2018-11-25 PROCEDURE — 70450 CT HEAD/BRAIN W/O DYE: CPT | Mod: 26

## 2018-11-25 PROCEDURE — 72170 X-RAY EXAM OF PELVIS: CPT | Mod: 26

## 2018-11-25 PROCEDURE — 70486 CT MAXILLOFACIAL W/O DYE: CPT | Mod: 26

## 2018-11-25 PROCEDURE — G0390: CPT

## 2018-11-25 PROCEDURE — 99291 CRITICAL CARE FIRST HOUR: CPT | Mod: 25,GC

## 2018-11-25 PROCEDURE — 71045 X-RAY EXAM CHEST 1 VIEW: CPT | Mod: 26

## 2018-11-25 PROCEDURE — 99291 CRITICAL CARE FIRST HOUR: CPT

## 2018-11-25 PROCEDURE — 76377 3D RENDER W/INTRP POSTPROCES: CPT

## 2018-11-25 PROCEDURE — 12002 RPR S/N/AX/GEN/TRNK2.6-7.5CM: CPT

## 2018-11-25 PROCEDURE — 76377 3D RENDER W/INTRP POSTPROCES: CPT | Mod: 26

## 2018-11-25 RX ORDER — ACETAMINOPHEN 500 MG
1000 TABLET ORAL ONCE
Qty: 0 | Refills: 0 | Status: COMPLETED | OUTPATIENT
Start: 2018-11-25 | End: 2018-11-25

## 2018-11-25 RX ORDER — TETANUS TOXOID, REDUCED DIPHTHERIA TOXOID AND ACELLULAR PERTUSSIS VACCINE, ADSORBED 5; 2.5; 8; 8; 2.5 [IU]/.5ML; [IU]/.5ML; UG/.5ML; UG/.5ML; UG/.5ML
0.5 SUSPENSION INTRAMUSCULAR ONCE
Qty: 0 | Refills: 0 | Status: COMPLETED | OUTPATIENT
Start: 2018-11-25 | End: 2018-11-25

## 2018-11-25 NOTE — ED PROVIDER NOTE - CARE PLAN
Principal Discharge DX:	Closed head injury, initial encounter  Secondary Diagnosis:	Facial laceration, initial encounter

## 2018-11-25 NOTE — CONSULT NOTE ADULT - SUBJECTIVE AND OBJECTIVE BOX
TRAUMA SERVICE (Acute Care Surgery / [facility] - #9079) - CONSULT NOTE  --------------------------------------------------------------------------------------------    TRAUMA ACTIVATION LEVEL: II    MECHANISM OF INJURY:      [x] Blunt  	[] MVC	[x] Fall	[] Pedestrian Struck	[] Motorcycle accident      [] Penetrating  	[] Gun Shot Wound 		[] Stab Wound    GCS: 15	E: 4	V: 5	M: 6      HPI:   88F with pmh of dementia, seizures, CVA, afib on Eliquis level II trauma activation after a mechanical fall from standing, witnessed by aid at skilled nursing facility, no LOC.  Patient was also noted to be more confused than usual per her aid. BIBEMS.     Primary Survey:    A - airway intact  B - bilateral breath sounds and good chest rise  C - initial BP  BP: 130/80 , HR HR: 84 , palpable pulses in all extremities  D - GCS 15 on arrival  Exposure obtained      Secondary Survey:   General: NAD, confused about year and where she was, oriented to self  HEENT: Normocephalic, 2cm laceration on right eyebrow, ecchymosis over right zygoma,  EOMI, PEERLA.  Neck: Soft, midline trachea.  Chest: No chest wall tenderness.   Cardiac: S1, S2, RRR  Respiratory: Bilateral breath sounds, clear and equal bilaterally  Abdomen: Soft, non-distended, non-tender, no rebound, no guarding, no masses palpated  Groin: Normal appearing  Pelvis: stable  Ext: palp radial b/l UE, b/l DP palp in Lower Extrem, motor and sensory grossly intact in all 4 extremities, bilateral lower extremity ecchymosis appears stable.   Back: no TTP, no palpable runoff/stepoff/deformity  Rectal: No dilan blood, + brown stool,  LATISHA with good tone    Patient denies fevers/chills, denies lightheadedness/dizziness, denies SOB/chest pain, denies nausea/vomiting, denies constipation/diarrhea.  ***    ROS: 10-system review is otherwise negative except HPI above.      PAST MEDICAL & SURGICAL HISTORY:  Stroke  Hypertension  atrial fibrillation   Borderline diabetes  H/O hemorrhoidectomy  Nephrolithiasis  Cataract, bilateral  Seizures    FAMILY HISTORY:  No pertinent family history in first degree relatives    [] Family history not pertinent as reviewed with the patient and family    SOCIAL HISTORY: no ETOH, no smoking    ALLERGIES: aspirin (Unknown)  codeine (Nausea)  morphine (Vomiting)  penicillins (Unknown)  red dye (Unknown)  statins (Unknown)      HOME MEDICATIONS:   Eliquis   Remeron  Singulair  Spiriva  Keppra  Levalbuterol  Pulmicort   amiodarone  diltiazem     CURRENT MEDICATIONS  MEDICATIONS (STANDING): acetaminophen  IVPB .. 1000 milliGRAM(s) IV Intermittent once  diphtheria/tetanus/pertussis (acellular) Vaccine (ADAcel) 0.5 milliLiter(s) IntraMuscular once    MEDICATIONS (PRN):  --------------------------------------------------------------------------------------------    Vitals:   T(C): --  HR: --  BP: --  RR: --  SpO2: --          LABS  CBC (11-25 @ 21:50)                              11.9                           10.5    )----------------(  289        68.5  % Neutrophils, 16.3  % Lymphocytes, ANC: 7.2                                 36.4      BMP (11-25 @ 21:49)             133<L>  |  99      |  58<H> 		Ca++ --      Ca 9.3                ---------------------------------( 131<H>		Mg --                 5.1     |  19<L>   |  1.99<H>			Ph --        LFTs (11-25 @ 21:49)      TPro 7.7 / Alb 3.9 / TBili 0.4 / DBili -- / AST 31 / ALT 27 / AlkPhos 107    Coags (11-25 @ 21:50)  aPTT 34.5 / INR 1.15 / PT 13.2<H>          --------------------------------------------------------------------------------------------    MICROBIOLOGY      --------------------------------------------------------------------------------------------    IMAGING  Pending CT head, C-spine, maxillofacial     -------------------------------------------------------------------------------------------- TRAUMA SERVICE (Acute Care Surgery / [facility] - #9072) - CONSULT NOTE  --------------------------------------------------------------------------------------------    TRAUMA ACTIVATION LEVEL: II    MECHANISM OF INJURY:      [x] Blunt  	[] MVC	[x] Fall	[] Pedestrian Struck	[] Motorcycle accident      [] Penetrating  	[] Gun Shot Wound 		[] Stab Wound    GCS: 15	E: 4	V: 5	M: 6      HPI:   88F with pmh of dementia, seizures, CVA, afib on Eliquis level II trauma activation after a mechanical fall from standing, witnessed by aid at skilled nursing facility, no LOC.  Patient was also noted to be more confused than usual per her aid. BIBEMS.     Primary Survey:    A - airway intact  B - bilateral breath sounds and good chest rise  C - initial BP  BP: 130/80 , HR HR: 84 , palpable pulses in all extremities  D - GCS 15 on arrival  Exposure obtained      Secondary Survey:   General: NAD, confused about year and where she was, oriented to self  HEENT: Normocephalic, 2cm laceration on right eyebrow, ecchymosis over right zygoma,  EOMI, PEERLA.  Neck: Soft, midline trachea.  Chest: No chest wall tenderness.   Cardiac: S1, S2, RRR  Respiratory: Bilateral breath sounds, clear and equal bilaterally  Abdomen: Soft, non-distended, non-tender, no rebound, no guarding, no masses palpated  Groin: Normal appearing  Pelvis: stable  Ext: palp radial b/l UE, b/l DP palp in Lower Extrem, motor and sensory grossly intact in all 4 extremities, bilateral lower extremity ecchymosis appears stable.   Back: no TTP, no palpable runoff/stepoff/deformity  Rectal: No dilan blood, + brown stool,  LATISHA with good tone    Patient denies fevers/chills, denies lightheadedness/dizziness, denies SOB/chest pain, denies nausea/vomiting, denies constipation/diarrhea.  ***    ROS: 10-system review is otherwise negative except HPI above.      PAST MEDICAL & SURGICAL HISTORY:  Stroke  Hypertension  atrial fibrillation   Borderline diabetes  H/O hemorrhoidectomy  Nephrolithiasis  Cataract, bilateral  Seizures    FAMILY HISTORY:  No pertinent family history in first degree relatives    [] Family history not pertinent as reviewed with the patient and family    SOCIAL HISTORY: no ETOH, no smoking    ALLERGIES: aspirin (Unknown)  codeine (Nausea)  morphine (Vomiting)  penicillins (Unknown)  red dye (Unknown)  statins (Unknown)      HOME MEDICATIONS:   Eliquis   Remeron  Singulair  Spiriva  Keppra  Levalbuterol  Pulmicort   amiodarone  diltiazem     CURRENT MEDICATIONS  MEDICATIONS (STANDING): acetaminophen  IVPB .. 1000 milliGRAM(s) IV Intermittent once  diphtheria/tetanus/pertussis (acellular) Vaccine (ADAcel) 0.5 milliLiter(s) IntraMuscular once    MEDICATIONS (PRN):  --------------------------------------------------------------------------------------------    Vitals:   T(C): --  HR: --  BP: --  RR: --  SpO2: --          LABS  CBC (11-25 @ 21:50)                              11.9                           10.5    )----------------(  289        68.5  % Neutrophils, 16.3  % Lymphocytes, ANC: 7.2                                 36.4      BMP (11-25 @ 21:49)             133<L>  |  99      |  58<H> 		Ca++ --      Ca 9.3                ---------------------------------( 131<H>		Mg --                 5.1     |  19<L>   |  1.99<H>			Ph --        LFTs (11-25 @ 21:49)      TPro 7.7 / Alb 3.9 / TBili 0.4 / DBili -- / AST 31 / ALT 27 / AlkPhos 107    Coags (11-25 @ 21:50)  aPTT 34.5 / INR 1.15 / PT 13.2<H>          IMAGING  < from: CT 3D Reconstruct w/ Workstation (11.25.18 @ 22:03) >    EXAM:  CT BRAIN                          EXAM:  CT MAXILLOFACIAL                          EXAM:  CT CERVICAL SPINE                          EXAM:  CT 3D RECONSTRUCT W CALLI                            PROCEDURE DATE:  11/25/2018            INTERPRETATION:  CLINICAL INFORMATION: Trauma. Fall.    TECHNIQUE:   1.  Axial CT images were acquired through the head.  2.  Axial CT images were acquired through the maxillofacial bones.  3.  Axial CT images were acquired through the cervical spine.    Intravenous contrast: None  Two-dimensional reformats were generated. Three-D reformatted images were   also obtained.    COMPARISON STUDY: 2/27/2017    FINDINGS:   CT HEAD:    There is no CT evidence of acute intracranial hemorrhage,  mass effect,   midline shift, or acute territorial infarct.    The ventricles and sulci   are normal in size and configuration. The basal cisterns are patent.    There are periventricular white matter hypodensities that are nonspecific   in nature but may reflect chronic ischemic microvascular disease.    The calvarium and skull base are intact.        CT MAXILLOFACIAL BONES:    There is no acute maxillofacial bone fracture.     The mandible is intact.  The temporomandibular joints are not dislocated.    Moderatemucosal thickening of the sphenoid sinuses with suggestion of   fluid levels.    The nasal septum is grossly intact.  There is no abnormal soft tissue   within the nasal cavity.    There is no gross CT evidence of traumatic globe injury.  The optic   globes are smooth and symmetric in contour. The retrobulbar and   extraconal fat is well-preserved. The extraocular muscles are not   enlarged or deviated.    CT CERVICAL SPINE:    There is  preservation  of the cervical lordosis.  There is no evidence of an acute cervical spine fracture or traumatic   malalignment.  There is no suspicious lytic or blastic lesion.  The paraspinous soft tissues are unremarkable within limits of CT scan.    Degenerative changes:  There are multilevel degenerative changes characterized by disc   osteophyte complexes and facet and uncinate hypertrophy with resultant   mild multilevel central canal and neural foraminal stenosis.    Incidental findings:  Visualized soft tissues of the neck appear unremarkable.  Visualized upper chest appears unremarkable.      IMPRESSION:     CT HEAD: No acute intracranial hemorrhage, mass effect, or osseous   fracture.    CT MAXILLOFACIAL BONES: No acute maxillofacial bone or mandibular   fracture.    Paranasal sinus disease as described. Correlate clinically for acute   sinusitis.    CT CERVICAL SPINE: No acute cervical spine fracture or traumatic   malalignment.                  < end of copied text >      --------------------------------------------------------------------------------------------

## 2018-11-25 NOTE — CONSULT NOTE ADULT - ASSESSMENT
ASSESSMENT: Patient is a 88F s/p witnessed mechanical fall from standing on Eliquis for afib    PLAN:    - Follow official reads of cross sectional imaging   -   -   -     Plan discussed with attending Dr. Arvizu ASSESSMENT: Patient is a 88F s/p witnessed mechanical fall from standing on Eliquis for afib    PLAN:    - No traumatic injuries noted on imaging   - No acute trauma surgery intervention indicated at this time      Plan discussed with attending Dr. Arvizu

## 2018-11-25 NOTE — ED PROVIDER NOTE - MEDICAL DECISION MAKING DETAILS
88 F with witnessed fall, on eliquis with head trauma. Primary survey with intact airway, bilat breath sounds, equal pulses in all extremities, BP in 130systolic. Secondary survery with 4cm laceration to R eyebrow, ecchymosis to R brow and cheek, trachea midline, no chest wall / abd ecchymosis, bilat LE with ecchymosis that appears old, moving all extremities, spinal exam with no stepoffs or point tenderness. Plan: ct head / c-spine / max-facial, labs, pain control, cxr/pelvis, reassess

## 2018-11-25 NOTE — ED CLERICAL - NS ED CLERK NOTE PRE-ARRIVAL INFORMATION; ADDITIONAL PRE-ARRIVAL INFORMATION
This patient is enrolled in the [facility] and receives comprehensive home-based primary care.  - To obtain additional clinical information , or to discuss any questions or concerns, you can call the [facility] team at [phone], 24 hours a day.  - If discharged, this patient will be followed up by the [facility] team within 2 days.  - If this patient requires admission, please use the hospitalist service.     The patient has been telephonically triaged by [facility]'s Care Management team and was sent to the ED for further evaluation. Please contact me at the number below for additional information.

## 2018-11-25 NOTE — ED PROVIDER NOTE - PROGRESS NOTE DETAILS
Dr. Onofre Note: chronic elevated troponin, lower today than usual, EKg unremarkable, no further w/u required for this.  Awaiting ct results, lac repair and likely dc.

## 2018-11-25 NOTE — ED PROVIDER NOTE - ATTENDING CONTRIBUTION TO CARE
Level 2 Trauma called on EMS arrival due to AMS reported with head injury on A/c.  Pt with witnessed fall, no LOC.  Primary survey intact, GCS 15, Secondary survey: demented, O*2, 3cm R facial lac eyebrow, contusions R face, no bony td, rest unremarkable.  CTH/c-spine, labs, reassess.

## 2018-11-25 NOTE — ED PROVIDER NOTE - OBJECTIVE STATEMENT
88 F h/o cva, on eliquis, dementia, p/w witnessed fall and pos head injury. Not oriented on EMS arrival, level 2 trauma called.

## 2018-11-26 DIAGNOSIS — Z71.89 OTHER SPECIFIED COUNSELING: ICD-10-CM

## 2018-11-26 RX ADMIN — TETANUS TOXOID, REDUCED DIPHTHERIA TOXOID AND ACELLULAR PERTUSSIS VACCINE, ADSORBED 0.5 MILLILITER(S): 5; 2.5; 8; 8; 2.5 SUSPENSION INTRAMUSCULAR at 00:33

## 2019-04-08 ENCOUNTER — APPOINTMENT (OUTPATIENT)
Dept: INTERNAL MEDICINE | Facility: CLINIC | Age: 84
End: 2019-04-08

## 2019-07-30 ENCOUNTER — APPOINTMENT (OUTPATIENT)
Dept: HOME HEALTH SERVICES | Facility: HOME HEALTH | Age: 84
End: 2019-07-30

## 2019-11-04 NOTE — ED ADULT TRIAGE NOTE - NS ED NURSE AMBULANCES

## 2020-08-24 NOTE — PROGRESS NOTE ADULT - SUBJECTIVE AND OBJECTIVE BOX
Patient is a 87y old  Female who presents with a chief complaint of shortness of breath and 'not feeling well' (02 Aug 2017 15:57)   patient has been OK  no SOB  no cough  no phlegm     Any change in ROS:     MEDICATIONS  (STANDING):  insulin lispro (HumaLOG) corrective regimen sliding scale   SubCutaneous three times a day before meals  insulin lispro (HumaLOG) corrective regimen sliding scale   SubCutaneous at bedtime  dextrose 5%. 1000 milliLiter(s) (50 mL/Hr) IV Continuous <Continuous>  dextrose 50% Injectable 12.5 Gram(s) IV Push once  dextrose 50% Injectable 25 Gram(s) IV Push once  dextrose 50% Injectable 25 Gram(s) IV Push once  simvastatin 20 milliGRAM(s) Oral at bedtime  clopidogrel Tablet 75 milliGRAM(s) Oral daily  AQUAPHOR (petrolatum Ointment) 1 Application(s) Topical two times a day  heparin  Injectable 5000 Unit(s) SubCutaneous every 8 hours  docusate sodium 100 milliGRAM(s) Oral daily  senna 2 Tablet(s) Oral at bedtime  losartan 100 milliGRAM(s) Oral daily  BACItracin   Ointment 1 Application(s) Topical three times a day    MEDICATIONS  (PRN):  dextrose Gel 1 Dose(s) Oral once PRN Blood Glucose LESS THAN 70 milliGRAM(s)/deciliter  glucagon  Injectable 1 milliGRAM(s) IntraMuscular once PRN Glucose LESS THAN 70 milligrams/deciliter  oxyCODONE    IR 5 milliGRAM(s) Oral every 8 hours PRN Moderate Pain (4 - 6)    Vital Signs Last 24 Hrs  T(C): 36.4 (03 Aug 2017 04:26), Max: 36.8 (02 Aug 2017 11:27)  T(F): 97.6 (03 Aug 2017 04:26), Max: 98.3 (02 Aug 2017 21:29)  HR: 76 (03 Aug 2017 04:26) (76 - 83)  BP: 133/60 (03 Aug 2017 04:26) (133/60 - 146/70)  BP(mean): --  RR: 18 (03 Aug 2017 04:26) (17 - 18)  SpO2: 97% (03 Aug 2017 04:26) (94% - 97%)    I&O's Summary    02 Aug 2017 07:01  -  03 Aug 2017 07:00  --------------------------------------------------------  IN: 600 mL / OUT: 0 mL / NET: 600 mL    03 Aug 2017 07:01  -  03 Aug 2017 10:01  --------------------------------------------------------  IN: 180 mL / OUT: 0 mL / NET: 180 mL          Physical Exam:   GENERAL: NAD, well-groomed, well-developed  HEENT: HANSA/   Atraumatic, Normocephalic  ENMT: No tonsillar erythema, exudates, or enlargement; Moist mucous membranes, Good dentition, No lesions  NECK: Supple, No JVD, Normal thyroid  CHEST/LUNG: Clear to auscultaion, ; No rales, rhonchi, wheezing, or rubs  CVS: Regular rate and rhythm; No murmurs, rubs, or gallops  GI: : Soft, Nontender, Nondistended; Bowel sounds present  NERVOUS SYSTEM:  Alert & Oriented X3  EXTREMITIES:  2+ Peripheral Pulses, No clubbing, cyanosis, or edema  LYMPH: No lymphadenopathy noted  SKIN: No rashes or lesions  ENDOCRINOLOGY: No Thyromegaly  PSYCH: Appropriate    Labs:                              13.2   8.4   )-----------( 257      ( 30 Jul 2017 13:57 )             40.4     07-30    143  |  104  |  18  ----------------------------<  113<H>  4.5   |  26  |  0.70      TPro  7.0  /  Alb  3.9  /  TBili  0.2  /  DBili  x   /  AST  16  /  ALT  11  /  AlkPhos  56  07-30    CAPILLARY BLOOD GLUCOSE    Studies  Chest X-RAY  CT SCAN Chest < from: Xray Chest 1 View AP/PA (07.30.17 @ 14:49) >    INTERPRETATION:   Heart is not enlarged.  There is no evidence of focal consolidation.  There is no pleural effusions.  Trachea is midline.  No evidence of pneumothorax.    The visualized osseous structures no acute pathology.      IMPRESSION:  Clear lungs.                MOE PEREIRA M.D., RADIOLOGY RESIDENT  This document has been electronically signed.  TIAGO VALADEZ M.D., ATTENDING RADIOLOGIST  This document has been electronically signed. Jul 30 2017  3:24PM    < end of copied text >    Venous Dopplers: LE:   CT Abdomen  Others  < from: Xray Chest 1 View AP/PA (07.30.17 @ 14:49) >  EXAM:  CHEST SINGLE AP OR PA                            PROCEDURE DATE:  07/30/2017            INTERPRETATION:  EXAM DATE: 7/30/2017 2:49 PM    CLINICAL INFORMATION: Admission x-ray.    COMPARISON:  Chest x-ray dated February 27, 2017.    TECHNIQUE:   AP Portable chest x-ray.    INTERPRETATION:   Heart is not enlarged.  There is no evidence of focal consolidation.  There is no pleural effusions.  Trachea is midline.  No evidence of pneumothorax.    The visualized osseous structures no acute pathology.      IMPRESSION:  Clear lungs.                MOE PEREIRA M.D., RADIOLOGY RESIDENT  This document has been electronically signed.  TIAGO VALADEZ M.D., ATTENDING RADIOLOGIST  This document has been electronically signed. Jul 30 2017  3:24PM    < end of copied text >
Patient is a 87y old  Female who presents with a chief complaint of shortness of breath and 'not feeling well' (30 Jul 2017 19:30)      SUBJECTIVE / OVERNIGHT EVENTS: No nausea, vomiting or diarrhea, no fever or chills, no dizziness or chest pain, no dysuria or hematuria, no jt pain or swelling  Refusing labs and FS    MEDICATIONS  (STANDING):  insulin lispro (HumaLOG) corrective regimen sliding scale   SubCutaneous three times a day before meals  insulin lispro (HumaLOG) corrective regimen sliding scale   SubCutaneous at bedtime  dextrose 5%. 1000 milliLiter(s) (50 mL/Hr) IV Continuous <Continuous>  dextrose 50% Injectable 12.5 Gram(s) IV Push once  dextrose 50% Injectable 25 Gram(s) IV Push once  dextrose 50% Injectable 25 Gram(s) IV Push once  losartan 50 milliGRAM(s) Oral daily  simvastatin 20 milliGRAM(s) Oral at bedtime  clopidogrel Tablet 75 milliGRAM(s) Oral daily  AQUAPHOR (petrolatum Ointment) 1 Application(s) Topical two times a day  heparin  Injectable 5000 Unit(s) SubCutaneous every 8 hours  docusate sodium 100 milliGRAM(s) Oral daily  senna 2 Tablet(s) Oral at bedtime    MEDICATIONS  (PRN):  dextrose Gel 1 Dose(s) Oral once PRN Blood Glucose LESS THAN 70 milliGRAM(s)/deciliter  glucagon  Injectable 1 milliGRAM(s) IntraMuscular once PRN Glucose LESS THAN 70 milligrams/deciliter  oxyCODONE    IR 5 milliGRAM(s) Oral every 8 hours PRN Moderate Pain (4 - 6)        CAPILLARY BLOOD GLUCOSE        I&O's Summary    01 Aug 2017 07:01  -  02 Aug 2017 07:00  --------------------------------------------------------  IN: 360 mL / OUT: 0 mL / NET: 360 mL    02 Aug 2017 07:01  -  02 Aug 2017 14:37  --------------------------------------------------------  IN: 360 mL / OUT: 0 mL / NET: 360 mL    PHYSICAL EXAM cachexia +  HEAD:  Atraumatic, Normocephalic  EYES: EOMI, PERRLA, conjunctiva and sclera clear  NECK: Supple, No JVD  CHEST/LUNG: Clear to auscultation bilaterally; No wheeze  HEART: Regular rate and rhythm; No murmurs, rubs, or gallops  ABDOMEN: Soft, Nontender, Nondistended; Bowel sounds present  EXTREMITIES:  2+ Peripheral Pulses, No clubbing, cyanosis, or edema  PSYCH: AAO x 2 - 3  NEUROLOGY: non-focal  SKIN: No rashes or lesions      LABS:                      Consultant(s) Notes Reviewed:      Care Discussed with Consultants/Other Providers:    Contact Number, Dr Carter [phone]
Patient is a 87y old  Female who presents with a chief complaint of shortness of breath and 'not feeling well' (30 Jul 2017 19:30)      SUBJECTIVE / OVERNIGHT EVENTS: No nausea, vomiting or diarrhea, no fever or chills, no dizziness or chest pain, no dysuria or hematuria, no jt pain or swelling  no shortness of breath this AM    MEDICATIONS  (STANDING):  insulin lispro (HumaLOG) corrective regimen sliding scale   SubCutaneous three times a day before meals  insulin lispro (HumaLOG) corrective regimen sliding scale   SubCutaneous at bedtime  dextrose 5%. 1000 milliLiter(s) (50 mL/Hr) IV Continuous <Continuous>  dextrose 50% Injectable 12.5 Gram(s) IV Push once  dextrose 50% Injectable 25 Gram(s) IV Push once  dextrose 50% Injectable 25 Gram(s) IV Push once  losartan 50 milliGRAM(s) Oral daily  simvastatin 20 milliGRAM(s) Oral at bedtime  clopidogrel Tablet 75 milliGRAM(s) Oral daily  AQUAPHOR (petrolatum Ointment) 1 Application(s) Topical two times a day  heparin  Injectable 5000 Unit(s) SubCutaneous every 8 hours    MEDICATIONS  (PRN):  dextrose Gel 1 Dose(s) Oral once PRN Blood Glucose LESS THAN 70 milliGRAM(s)/deciliter  glucagon  Injectable 1 milliGRAM(s) IntraMuscular once PRN Glucose LESS THAN 70 milligrams/deciliter  oxyCODONE    IR 5 milliGRAM(s) Oral every 8 hours PRN Moderate Pain (4 - 6)        CAPILLARY BLOOD GLUCOSE  114 (31 Jul 2017 07:55)  117 (30 Jul 2017 18:45)        I&O's Summary    30 Jul 2017 07:01  -  31 Jul 2017 07:00  --------------------------------------------------------  IN: 630 mL / OUT: 0 mL / NET: 630 mL        PHYSICAL EXAM:  GENERAL: NAD, rather emaciated  HEAD:  Atraumatic, Normocephalic  EYES: EOMI, PERRLA, conjunctiva and sclera clear  NECK: Supple, No JVD  CHEST/LUNG: Clear to auscultation bilaterally; No wheeze  HEART: Regular rate and rhythm; soft ejection systolic murmur best heard at left sternal border   ABDOMEN: Soft, Nontender, Nondistended; Bowel sounds present  EXTREMITIES:  2+ Peripheral Pulses, No clubbing, cyanosis, left leg circumference > right leg  PSYCH: AAOx3  NEUROLOGY: non-focal  SKIN: No rashes or lesions    LABS: from yesterday                        13.2   8.4   )-----------( 257      ( 30 Jul 2017 13:57 )             40.4     07-30    143  |  104  |  18  ----------------------------<  113<H>  4.5   |  26  |  0.70    Ca    9.6      30 Jul 2017 13:57    TPro  7.0  /  Alb  3.9  /  TBili  0.2  /  DBili  x   /  AST  16  /  ALT  11  /  AlkPhos  56  07-30    PT/INR - ( 30 Jul 2017 14:40 )   PT: 10.6 sec;   INR: 0.97 ratio         PTT - ( 30 Jul 2017 14:40 )  PTT:32.7 sec  CARDIAC MARKERS ( 30 Jul 2017 13:57 )  x     / <0.01 ng/mL / 51 U/L / x     / 2.8 ng/mL            Consultant(s) Notes Reviewed:      Care Discussed with Consultants/Other Providers:    Contact Number, Dr Carter [phone]
Patient is a 87y old  Female who presents with a chief complaint of shortness of breath and 'not feeling well' (30 Jul 2017 19:30)      SUBJECTIVE / OVERNIGHT EVENTS: No nausea, vomiting or diarrhea, no fever or chills, no dizziness or chest pain, no dysuria or hematuria, no jt pain or swelling. Lying flat in bed "leave me alone'    MEDICATIONS  (STANDING):  insulin lispro (HumaLOG) corrective regimen sliding scale   SubCutaneous three times a day before meals  insulin lispro (HumaLOG) corrective regimen sliding scale   SubCutaneous at bedtime  dextrose 5%. 1000 milliLiter(s) (50 mL/Hr) IV Continuous <Continuous>  dextrose 50% Injectable 12.5 Gram(s) IV Push once  dextrose 50% Injectable 25 Gram(s) IV Push once  dextrose 50% Injectable 25 Gram(s) IV Push once  losartan 50 milliGRAM(s) Oral daily  simvastatin 20 milliGRAM(s) Oral at bedtime  clopidogrel Tablet 75 milliGRAM(s) Oral daily  AQUAPHOR (petrolatum Ointment) 1 Application(s) Topical two times a day  heparin  Injectable 5000 Unit(s) SubCutaneous every 8 hours  docusate sodium 100 milliGRAM(s) Oral daily  senna 2 Tablet(s) Oral at bedtime    MEDICATIONS  (PRN):  dextrose Gel 1 Dose(s) Oral once PRN Blood Glucose LESS THAN 70 milliGRAM(s)/deciliter  glucagon  Injectable 1 milliGRAM(s) IntraMuscular once PRN Glucose LESS THAN 70 milligrams/deciliter  oxyCODONE    IR 5 milliGRAM(s) Oral every 8 hours PRN Moderate Pain (4 - 6)        CAPILLARY BLOOD GLUCOSE        I&O's Summary    31 Jul 2017 07:01  -  01 Aug 2017 07:00  --------------------------------------------------------  IN: 640 mL / OUT: 0 mL / NET: 640 mL    01 Aug 2017 07:01  -  01 Aug 2017 14:54  --------------------------------------------------------  IN: 360 mL / OUT: 0 mL / NET: 360 mL        PHYSICAL EXAMcachectic  HEAD:  Atraumatic, Normocephalic  EYES: EOMI, PERRLA, conjunctiva and sclera clear  NECK: Supple, No JVD  CHEST/LUNG: Clear to auscultation bilaterally; No wheeze  HEART: Regular rate and rhythm; No murmurs, rubs, or gallops  ABDOMEN: Soft, Nontender, Nondistended; Bowel sounds present  EXTREMITIES:  2+ Peripheral Pulses, No clubbing, cyanosis, or edema  PSYCH: AAO x 2 - 3  NEUROLOGY: non-focal  SKIN: No rashes or lesions    LABS: adamantly refusing                      Consultant(s) Notes Reviewed:      Care Discussed with Consultants/Other Providers:    Contact Number, Dr Carter [phone]
Patient is a 87y old  Female who presents with a chief complaint of shortness of breath and 'not feeling well' (30 Jul 2017 19:30)    today pt says she felt SOB this AM as she became very anxious waiting for somebody to come and help her out!    Any change in ROS:     MEDICATIONS  (STANDING):  insulin lispro (HumaLOG) corrective regimen sliding scale   SubCutaneous three times a day before meals  insulin lispro (HumaLOG) corrective regimen sliding scale   SubCutaneous at bedtime  dextrose 5%. 1000 milliLiter(s) (50 mL/Hr) IV Continuous <Continuous>  dextrose 50% Injectable 12.5 Gram(s) IV Push once  dextrose 50% Injectable 25 Gram(s) IV Push once  dextrose 50% Injectable 25 Gram(s) IV Push once  losartan 50 milliGRAM(s) Oral daily  simvastatin 20 milliGRAM(s) Oral at bedtime  clopidogrel Tablet 75 milliGRAM(s) Oral daily  AQUAPHOR (petrolatum Ointment) 1 Application(s) Topical two times a day  heparin  Injectable 5000 Unit(s) SubCutaneous every 8 hours  docusate sodium 100 milliGRAM(s) Oral daily  senna 2 Tablet(s) Oral at bedtime    MEDICATIONS  (PRN):  dextrose Gel 1 Dose(s) Oral once PRN Blood Glucose LESS THAN 70 milliGRAM(s)/deciliter  glucagon  Injectable 1 milliGRAM(s) IntraMuscular once PRN Glucose LESS THAN 70 milligrams/deciliter  oxyCODONE    IR 5 milliGRAM(s) Oral every 8 hours PRN Moderate Pain (4 - 6)    Vital Signs Last 24 Hrs  T(C): 36.7 (02 Aug 2017 04:27), Max: 37.7 (01 Aug 2017 11:16)  T(F): 98.1 (02 Aug 2017 04:27), Max: 99.9 (01 Aug 2017 11:16)  HR: 74 (02 Aug 2017 04:27) (70 - 81)  BP: 174/82 (02 Aug 2017 04:27) (122/69 - 174/82)  BP(mean): --  RR: 18 (02 Aug 2017 04:27) (17 - 18)  SpO2: 95% (02 Aug 2017 04:27) (94% - 96%)    I&O's Summary    01 Aug 2017 07:01  -  02 Aug 2017 07:00  --------------------------------------------------------  IN: 360 mL / OUT: 0 mL / NET: 360 mL          Physical Exam:   GENERAL: NAD, well-groomed, well-developed  HEENT: HANSA/   Atraumatic, Normocephalic  ENMT: No tonsillar erythema, exudates, or enlargement; Moist mucous membranes, Good dentition, No lesions  NECK: Supple, No JVD, Normal thyroid  CHEST/LUNG:  mostly clear!!  CVS: Regular rate and rhythm; No murmurs, rubs, or gallops  GI: : Soft, Nontender, Nondistended; Bowel sounds present  NERVOUS SYSTEM:  Alert & Oriented X3  EXTREMITIES:  2+ Peripheral Pulses, No clubbing, cyanosis, or edema  LYMPH: No lymphadenopathy noted  SKIN: No rashes or lesions  ENDOCRINOLOGY: No Thyromegaly  PSYCH: Anxious!    Labs:                              13.2   8.4   )-----------( 257      ( 30 Jul 2017 13:57 )             40.4     07-30    143  |  104  |  18  ----------------------------<  113<H>  4.5   |  26  |  0.70      TPro  7.0  /  Alb  3.9  /  TBili  0.2  /  DBili  x   /  AST  16  /  ALT  11  /  AlkPhos  56  07-30    CAPILLARY BLOOD GLUCOSE        Studies  Chest X-RAY  CT SCAN Chest   < from: Xray Chest 1 View AP/PA (07.30.17 @ 14:49) >    EXAM:  CHEST SINGLE AP OR PA                            PROCEDURE DATE:  07/30/2017            INTERPRETATION:  EXAM DATE: 7/30/2017 2:49 PM    CLINICAL INFORMATION: Admission x-ray.    COMPARISON:  Chest x-ray dated February 27, 2017.    TECHNIQUE:   AP Portable chest x-ray.    INTERPRETATION:   Heart is not enlarged.  There is no evidence of focal consolidation.  There is no pleural effusions.  Trachea is midline.  No evidence of pneumothorax.    The visualized osseous structures no acute pathology.      IMPRESSION:  Clear lungs.        MOE PEREIRA M.D., RADIOLOGY RESIDENT  This document has been electronically signed.  TIAGO VALADEZ M.D., ATTENDING RADIOLOGIST  This document has been electronically signed. Jul 30 2017  3:24PM    < end of copied text >  Venous Dopplers: LE:   CT Abdomen  Others
20

## 2020-08-31 NOTE — DIETITIAN INITIAL EVALUATION ADULT. - DATE OF WEIGHT PRIOR TO ADM
Use the shoe  in whatever shoe shoe wear every day.  Continue using the topical antifungal daily  Foot antifungal foot powder in your socks every morning.   01-Jul-2015

## 2020-09-30 NOTE — DIETITIAN INITIAL EVALUATION ADULT. - PROBLEM SELECTOR PROBLEM 1
Acute nonintractable headache, unspecified headache type Clear bilaterally, pupils equal, round and reactive to light.

## 2021-01-13 NOTE — ED PROVIDER NOTE - GASTROINTESTINAL, MLM
Chief complaint:   Chief Complaint   Patient presents with   • Follow-up     and refill meds       Vitals:  Visit Vitals  /78 (BP Location: RUE - Right upper extremity, Patient Position: Sitting, Cuff Size: Large Adult)   Pulse 88   Temp 98.4 °F (36.9 °C) (Oral)   Ht 5' 11\" (1.803 m)   Wt 124.3 kg   BMI 38.22 kg/m²       HISTORY OF PRESENT ILLNESS       Chief Complaint   Patient presents with   • Follow-up     and refill meds     Tyree was seen today for fu    Diagnoses and all orders for this visit    RLS  Nocturnal leg cramps  -   at night when lies down, both calf muscles  Better when he gets up and walks  on ropinirole, symptoms much better with medicine    Chronic pain of right knee  xr past showed severe djd  seen ortho past  Recommended surgery but pt wants to wait  Using lidocaine ointment, also has a cane  Also has right shoulder djd for which he has seen ortho in the past  Gets cortisone shot knees from rheum periodically which helps, but not seen recently    Chronic back pain  Was Following with pain clinic  Dr kirk and was on nortriptyline, do not see that in med list now  Now takes diclofenac gel and tizanidine prn    hld was on lipitor for a long time  Was having cramps, stopped lipitor, cramps better with pravastatin  Lipid panel due today, pt is fasting      Gout controlled ,on uloricand allopurinol 100 mg daily (was rec 200 mg po qd) and takes colchicine prn  Now right ankle pain better  Not seen rheum recently  Uric acid due    abn ct lung sees pulm   Sleep apnea, sees pulmonary    Benign essential HTN    Diabetes 2 controlled, insulin stopped after bariatric surgery  Now on metformin  a1c due    Obesity (BMI 30-39.9)    CKD (chronic kidney disease), stage III, follows with dr cox     Cardiomyopathy, with ef 27%  Seen cards, recommended ep fu for consideration of Defibrillator  Pt hesitant as does not want to go through surgery  On medical mgmt  On coreg and furosemide and  losartan    Depression screen   Recent PHQ 2/9 Score    PHQ 2:  Date Adult PHQ 2 Score Adult PHQ 2 Interpretation   1/13/2021 2 No further screening needed       Due to family problems, doesn't want meds or counseling    Patient Active Problem List   Diagnosis   • Cardiomyopathy, non- ischemic, ef 27 % 7/2020, on coreg and lasix and losartan   • Diabetes mellitus, type II (CMS/HCC)   • Hypertension   • Gout, follows with rheum, on uloric, and allopurinol, colchicine prn   • KELLY (obstructive sleep apnea)    • Hyperlipidemia, on pravastatin, had cramps with crestor   • Polyarthropathy   • CKD (chronic kidney disease), stage III, follows with dr cox   • Anemia of other chronic disease   • Adrenal hyperplasia (CMS/HCC)   • Abnormal LFTs, resolved, pt on pravastatin   • Tubulovillous adenoma of colon 9/2015, tubular adenoma 4/2019, rpt 3 yrs   • Benign non-nodular prostatic hyperplasia with lower urinary tract symptoms   • Spondylosis of lumbar region ,chronic back pain, follows with pain mgmt   • Osteoarthritis of both knees   • Decreased urine stream, sees dr bernardo   • Chronic allergic rhinitis   • CHF NYHA class II (CMS/HCC)   • Sinus bradycardia   • S/P endoscopy showed esophagitis 4/2019, sees gi, on ppi   • Obesity (BMI 30-39.9), lost weight after bariatric surgery   • Anatomical narrow angle   • BDR (background diabetic retinopathy) (CMS/HCC)   • Cataract   • Congenital retinal pigment epithelial hypertrophy         PAST MEDICAL, FAMILY AND SOCIAL HISTORY     Current Outpatient Medications   Medication Sig   • rOPINIRole (Requip) 0.5 MG tablet Take 1 tablet by mouth nightly.   • Uloric 40 MG Tab Take 1 tablet by mouth daily. Brand name only   • metFORMIN (Glucophage XR) 500 MG 24 hr tablet Take 1 tablet by mouth daily.   • spironolactone (ALDACTONE) 25 MG tablet Take 1 tablet by mouth daily.   • allopurinol (Zyloprim) 100 MG tablet Take 2 tablets by mouth daily.   • losartan (COZAAR) 50 MG tablet Take 2 tablets  by mouth every morning. Or can take one tablet in the morning and one tablet in the evening   • pravastatin (PRAVACHOL) 10 MG tablet TAKE 1 TABLET BY MOUTH DAILY   • carvedilol (COREG) 25 MG tablet Take 1 tablet by mouth 2 times daily (with meals).   • furosemide (LASIX) 40 MG tablet Take 2 tablets by mouth 2 times daily.   • omeprazole (PRILOSEC) 40 MG capsule Take 1 capsule by mouth daily.   • acetaminophen (TYLENOL 8 HOUR) 650 MG CR tablet Take 1 tablet by mouth every 8 hours as needed for Pain.   • diclofenac (VOLTAREN) 1 % gel APPLY 4 GRAMS TOPICALLY  QID   • tiZANidine (ZANAFLEX) 4 MG tablet TK 1 T PO TID   • DISPENSE cane   • ONETOUCH DELICA LANCETS FINE Misc Testing twice daily   • cholecalciferol (VITAMIN D3) 1000 UNITS tablet Take 2,000 Units by mouth daily.   • vitamin - therapeutic multivitamins w/minerals (CENTRUM SILVER,THERA-M) Tab Take 1 tablet by mouth daily.   • colchicine (COLCRYS) 0.6 MG tablet Take 1 tablet by mouth daily.     No current facility-administered medications for this visit.        Allergies:  ALLERGIES:   Allergen Reactions   • Crestor [Rosuvastatin Calcium] MYALGIA     cramps   • Lipitor [Atorvastatin] MYALGIA     cramps   • Cymbalta [Duloxetine Hcl] Other (See Comments)     Feet swelling       Past Medical  History/Surgeries:  Past Medical History:   Diagnosis Date   • Arthritis    • Bradycardia    • Cardiomyopathy (CMS/HCC)    • Chronic pain     Back pain   • Congestive cardiac failure (CMS/HCC)    • Diabetes mellitus (CMS/HCC)    • Essential (primary) hypertension    • Fall from slipping on ice 02/26/2019   • Gout    • Other and unspecified hyperlipidemia    • Sleep apnea     wears CPAP   • Wears glasses        Past Surgical History:   Procedure Laterality Date   • Bariatric surgery     • Colonoscopy diagnostic  9/22/2015    tubular polyp 3yr recall    • Colonoscopy diagnostic  04/17/2019    3yr recall    • Esophagogastroduodenoscopy  04/17/2019   • Gastric bypass  3/2015     banding by Dr. Salcedo   • Left heart cath,percutaneous  2013    normal coronaries   • Right heart cath  2017   • Right heart catheterization  2016       Family History:  Family History   Problem Relation Age of Onset   • Hypertension Mother    • Diabetes Daughter    • Hypertension Daughter    • Other Sister         brain aneurysm   • Infectious Disease Maternal Grandmother    • Stroke Maternal Grandmother    • Cancer Maternal Grandmother         colon?   • Heart Maternal Grandmother    • Diabetes Maternal Aunt    • HIV Maternal Aunt    • Other Maternal Aunt         brain aneurysm   • Heart Grandchild          at birth of heart problems   • Heart Grandchild         had heart transplant at 18 months       Social History:  Social History     Tobacco Use   • Smoking status: Former Smoker     Packs/day: 0.50     Years: 25.00     Pack years: 12.50     Types: Cigarettes     Quit date: 3/20/1998     Years since quittin.8   • Smokeless tobacco: Never Used   Substance Use Topics   • Alcohol use: No     Frequency: Never     Binge frequency: Never       REVIEW OF SYSTEMS     Review of Systems  Denies cp (chest pain), sob (shortness of breath), dizziness, palpitations, myalgias, nausea, vomiting.    PHYSICAL EXAM     Physical Exam    Physical Exam    Vital Signs:    Visit Vitals  /78 (BP Location: RUE - Right upper extremity, Patient Position: Sitting, Cuff Size: Large Adult)   Pulse 88   Temp 98.4 °F (36.9 °C) (Oral)   Ht 5' 11\" (1.803 m)   Wt 124.3 kg   BMI 38.22 kg/m²     Constitutional:  Well groomed, obese  Psychiatric: Alert & Oriented X3.  Good mood/affect.  Eyes:  Pupils equally reactive to light. Conjunctiva normal.    Cardiovascular:  Normal heart rate. Normal rhythm. No murmurs. B/l 1+ pedal edema.    Respiratory:  Effort normal.  Normal breath sounds. No respiratory distress.   Skin:changes of stasis dermatitis  Abd soft, nt, nd, nabs  Diabetic Foot Exam Documentation  Normal Bilateral  Foot Exam: Skin integrity is normal. Dorsalis pedis and posterior tibial pulses are present.  Pressure sensation using the Paterson-Melody monofilament is present.   Skin lower legs changes of stasis dermatitis  Toenail discolored and deformity    ASSESSMENT/PLAN     Tyree was seen today for fu  Diagnoses and all orders for this visit:    Idiopathic gout, unspecified chronicity, unspecified site  Currently has been controlled with prophylaxis  Check uric acid levels  Counseled gout diet    Obesity (BMI 30-39.9)  Counseled about diet and exercise.  Offered dietician referal, pt declined    RLS  No claudication   controlled  continue rOPINIRole (REQUIP) 0.5 MG tablet; Take 1 tablet by mouth qhs    hld  Intolerance to lipitor  Switched to another statin as diabetic, pravastatin 10 mg po qd, tolerating this without any s/e  Last lipid panel 7/2019  Rpt today    Chronic pain of both knees,  right knee worse than left  Primary osteoarthritis of both knees r>l  sees ORTHOPEDICS  Might need to consider knee replacement right as this could be affecting other jts, like right ankle  Pt says he was informed in the past he needs right knee replacement, but he did not fu as was hesitant  Uses lidocaine ointment, cane  Sees rheum for cortisone shot  This is affecting his gait and balance but does not want knee replacement at this time    Chronic back pain   Fu with pain clinic         Nonischemic cardiomyopathy, past ef 27 on  Echo 7/2020  On lasix and coreg and losartan,  Fu cardiologist   defibrillator recommended per past cards note  Now note from 7/2020 after echo done, recommends continue current rx  Pt was extensively counseled and asked to fu with ep    Depression  controlled without meds  Now situational due to family problems  Denies si or hi  Does not want meds as situational and will inform us if not better  Does not want to resume sertraline     Anemia of other chronic disease     Urinary retention sees   tova  Was  On flomax, not taking now     Status post bariatric surgery  On vit d3 2000 iu po qd, mvi, calcium in diet  Stopped b12 as levels high, dr phillips knows     CKD (chronic kidney disease), stage III, follows with dr cox    htn being managed by cards  Hydralazine on hold since bariatric surgery as bp under control     Diabetes being managed by endocrine  Rpt a1c  Insulin on hold since bariatric surgery  On metformin    Fu 6 mths or prn        KELLY (obstructive sleep apnea)   Uses cpap  Sees pulmonary    Abnormal LFTs, resolved, pt on pravastatin    Hearing loss of right ear, unspecified hearing loss type  -     SERVICE TO AUDIOLOGY    Other male erectile dysfunction  -     SERVICE TO UROLOGY        MEDICARE WELLNESS VISIT NOTE      HISTORY OF PRESENT ILLNESS:   Tyree Rodriguez presents for his Subsequent Annual Medicare Wellness Visit.   He has no current complaints or concerns.      Patient Care Team:  David Schwarz MD as PCP - General (Internal Medicine)  Neetu Cox MD (Nephrology)  Chris Schuster MD (Internal Medicine - Endocrinology,Diabetes,Metabolism)  Migdalia Warren DO (Rheumatology)  David Schwarz MD as Referring Provider (Internal Medicine)  Richy Rosenthal MD as Consulting Physician (Cardiovascular Disease)  Deyvi Cleary MD as Pulmonary Medicine (Pulmonary Medicine)  Isai Clemens MD (Urology)        Patient Active Problem List   Diagnosis   • Cardiomyopathy, non- ischemic, ef 27 % 7/2020, on coreg and lasix and losartan   • Diabetes mellitus, type II (CMS/HCC)   • Hypertension   • Gout, follows with rheum, on uloric, and allopurinol, colchicine prn   • KELLY (obstructive sleep apnea)    • Hyperlipidemia, on pravastatin, had cramps with crestor   • Polyarthropathy   • CKD (chronic kidney disease), stage III, follows with dr cox   • Anemia of other chronic disease   • Adrenal hyperplasia (CMS/HCC)   • Abnormal LFTs, resolved, pt on pravastatin   • Tubulovillous adenoma of colon 9/2015,  tubular adenoma 2019, rpt 3 yrs   • Benign non-nodular prostatic hyperplasia with lower urinary tract symptoms   • Spondylosis of lumbar region ,chronic back pain, follows with pain mgmt   • Osteoarthritis of both knees   • Decreased urine stream, sees dr bernardo   • Chronic allergic rhinitis   • CHF NYHA class II (CMS/HCC)   • Sinus bradycardia   • S/P endoscopy showed esophagitis 2019, sees gi, on ppi   • Obesity (BMI 30-39.9), lost weight after bariatric surgery   • Anatomical narrow angle   • BDR (background diabetic retinopathy) (CMS/HCC)   • Cataract   • Congenital retinal pigment epithelial hypertrophy         Past Medical History:   Diagnosis Date   • Arthritis    • Bradycardia    • Cardiomyopathy (CMS/HCC)    • Chronic pain     Back pain   • Congestive cardiac failure (CMS/HCC)    • Diabetes mellitus (CMS/HCC)    • Essential (primary) hypertension    • Fall from slipping on ice 2019   • Gout    • Other and unspecified hyperlipidemia    • Sleep apnea     wears CPAP   • Wears glasses          Past Surgical History:   Procedure Laterality Date   • Bariatric surgery     • Colonoscopy diagnostic  2015    tubular polyp 3yr recall    • Colonoscopy diagnostic  2019    3yr recall    • Esophagogastroduodenoscopy  2019   • Gastric bypass  3/2015    banding by Dr. Salcedo   • Left heart cath,percutaneous  2013    normal coronaries   • Right heart cath  2017   • Right heart catheterization  2016         Social History     Tobacco Use   • Smoking status: Former Smoker     Packs/day: 0.50     Years: 25.00     Pack years: 12.50     Types: Cigarettes     Quit date: 3/20/1998     Years since quittin.8   • Smokeless tobacco: Never Used   Substance Use Topics   • Alcohol use: No     Frequency: Never     Binge frequency: Never   • Drug use: No     Drug use:    Drug Use:    No              Family History   Problem Relation Age of Onset   • Hypertension Mother    • Diabetes Daughter     • Hypertension Daughter    • Other Sister         brain aneurysm   • Infectious Disease Maternal Grandmother    • Stroke Maternal Grandmother    • Cancer Maternal Grandmother         colon?   • Heart Maternal Grandmother    • Diabetes Maternal Aunt    • HIV Maternal Aunt    • Other Maternal Aunt         brain aneurysm   • Heart Grandchild          at birth of heart problems   • Heart Grandchild         had heart transplant at 18 months       Current Outpatient Medications   Medication Sig Dispense Refill   • rOPINIRole (Requip) 0.5 MG tablet Take 1 tablet by mouth nightly. 90 tablet 3   • Uloric 40 MG Tab Take 1 tablet by mouth daily. Brand name only 90 tablet 3   • metFORMIN (Glucophage XR) 500 MG 24 hr tablet Take 1 tablet by mouth daily. 90 tablet 3   • spironolactone (ALDACTONE) 25 MG tablet Take 1 tablet by mouth daily. 90 tablet 3   • allopurinol (Zyloprim) 100 MG tablet Take 2 tablets by mouth daily. 180 tablet 1   • losartan (COZAAR) 50 MG tablet Take 2 tablets by mouth every morning. Or can take one tablet in the morning and one tablet in the evening 180 tablet 3   • pravastatin (PRAVACHOL) 10 MG tablet TAKE 1 TABLET BY MOUTH DAILY 90 tablet 3   • carvedilol (COREG) 25 MG tablet Take 1 tablet by mouth 2 times daily (with meals). 180 tablet 3   • furosemide (LASIX) 40 MG tablet Take 2 tablets by mouth 2 times daily. 360 tablet 3   • omeprazole (PRILOSEC) 40 MG capsule Take 1 capsule by mouth daily. 60 capsule 3   • acetaminophen (TYLENOL 8 HOUR) 650 MG CR tablet Take 1 tablet by mouth every 8 hours as needed for Pain. 30 tablet 0   • diclofenac (VOLTAREN) 1 % gel APPLY 4 GRAMS TOPICALLY  QID  2   • tiZANidine (ZANAFLEX) 4 MG tablet TK 1 T PO TID  6   • DISPENSE cane 1 each 0   • ONETOUCH DELICA LANCETS FINE Misc Testing twice daily 100 each 11   • cholecalciferol (VITAMIN D3) 1000 UNITS tablet Take 2,000 Units by mouth daily.     • vitamin - therapeutic multivitamins w/minerals (CENTRUM SILVER,THERA-M)  Tab Take 1 tablet by mouth daily.     • colchicine (COLCRYS) 0.6 MG tablet Take 1 tablet by mouth daily. 30 tablet 3     No current facility-administered medications for this visit.         The following items on the Medicare Health Risk Assessment were found to be positive  1.) Do you have an Advance directive, living will, or power of  for health care document that contains your wishes for end of life care?: No  encouraged   2.) Would you like additional information on advance directives?: Yes  Paperwork given   4.) During the past 4 weeks, what was the hardest physical activity you could do for at least 2 minutes?: Light  Has chf, back pain, knee pain, counseled, as tolerated   5.) Do you do moderate to strenuous exercise (brisk walk) for about 20 minutes for 3 or more days per week?: No, I usually do not exercise this much  counseled   6 a.) How many servings of Fruits and Vegetables do you have each day ( 1 serving = 1 piece of fruit, 1/2 cup fruits or vegetables): 1 per day  Counseled on diet   6 b.) How many servings of High Fiber / Whole Grain Foods to you have each day ( 1 serving = 1 cup cold cereal, 1/2 cup cooked cereal, 1 slice bread): 1 per day  Counseled on healthy diet   8.) During the past 4 weeks, has your physical and emotional health limited your social activities with family, friends, neighbors, or other groups?: Quite a bit  Due to covid and family situation, says mild, doesn't want meds or  Counseling, will inform if gets worse   11g.) Anger or frustration: Often  Counseled.due to family situation, doesn't want any intervention   13.) Do you need help with any of the following activities?: Prepare a meal, Do your housework or laundry  Wife helps       Vision and Hearing screens: reviewed exam data present    Advance Directive:   The patient has the following documents:  No Advance Directives on file. Patient offered documents.  Given documents  Cognitive Assessment: no evidence of  cognitive dysfunction by direct observation    Recent PHQ 2/9 Score    PHQ 2:  Date Adult PHQ 2 Score Adult PHQ 2 Interpretation   1/13/2021 2 No further screening needed       PHQ 9:  Date Adult PHQ 9 Score Adult PHQ 9 Interpretation   11/6/2019 9 Mild Depression       DEPRESSION ASSESSMENT/PLAN:  Mild symptoms, will monitor and reevaluate.     Body mass index is 38.22 kg/m².    BMI ASSESSMENT/PLAN:  Patient is obese.    Journal food intake daily, Join health club, Recommend nutrition support group (i.e. Weight Watchers, etc.)  and 30 minutes of physical activity a day        Needed Screening/Treatment:   Immunizations reviewed and patient needs: Herpes Zoster and TDAP deferred    Needed follow up:  Audiology  Urology  referal placed    See orders.   See Patient Instructions section.   Return in about 6 months (around 7/13/2021) for fu.   Abdomen soft, non-tender, nondistended no guarding.

## 2021-08-12 NOTE — H&P ADULT - HISTORY OF PRESENT ILLNESS
Billing: [id number] (Total area less than 250 cm2) see cardiology consult written earlier Billing: [id number] (Total area less than 250 cm2) 88F with prior CVA (7/2015 with residual R sided weakness/pain), DM, HTN, BCC on face, p/w shortness of breath from nursing home. Pt has been tachypneic w/ retractions over the last two hours, placed on NC 3 L and transferred to ED. Pt in SVT, HR 160s, and hypotensive s/p DCCV in ER. Now NSR but still low BP.

## 2021-09-14 NOTE — ED PROVIDER NOTE - TOBACCO USE
Foundation liquid biopsy ordered per Dr. Rylan Barrett. Labs obtained peripherally and [employer] notified for pickup. Unknown if ever smoked

## 2021-10-23 NOTE — PHYSICAL THERAPY INITIAL EVALUATION ADULT - LEVEL OF INDEPENDENCE: STAND/SIT, REHAB EVAL
Patient without complaints    Reports minimal if any right hemifacial spasm with complete improvement of his hearing    Exam    Awake alert bright follows commands    No hemifacial spasm    Normal hearing    Cranial nerve 0/0    Motor 0/0    His incision looks clean dry and nicely healing, A    ASSESSMENT/PLAN    Neurologically stable    Discussed with patient wound and postoperative care    Discharge to home today    Follow-up to see me in 2 to 3 weeks    Discussed with patient and wife   minimum assist (75% patients effort)

## 2022-05-18 NOTE — PATIENT PROFILE ADULT. - NS PRO MODE OF ARRIVAL
"----- Message from Fide Cynthia sent at 5/18/2022  9:19 AM CDT -----  Regarding: "son" .[phone]  .Type: Patient Call Back    Who called: "son" .[phone]    What is the request in detail: Requesting refills on all of pt's medications  .    [id number] - [place] [address]  [address] [place] [address] [address]  [place] [address]  Phone: [phone] Fax: [phone]    Can the clinic reply by [facility]? Call back    Would the patient rather a call back or a response via [facility]? Call back    Best call back number: .[phone]        " stretcher

## 2022-05-22 NOTE — PROGRESS NOTE ADULT - PROBLEM/PLAN-5
[facility]  Progress Note Javon Brown 1940, 80 y o  male MRN: [id number]  Unit/Bed#: S -01 Encounter: [id number]  Primary Care Provider: Carlos Gann MD   Date and time admitted to hospital: 5/19/2022 10:05 PM    [address]  - Pt describing fall after discharge from hospital    - No injuries noted on primary, secondary, or tertiary trauma exams    - Multimodal pain regimen as needed    * Ambulatory dysfunction  Assessment & Plan  PT/OT recommend rehab  Patient aware    Delayed gastric emptying  Assessment & Plan  · Continue with home Reglan and Protonix  · Patient was started on a Reglan taper on 05/18  Patient is to be weaned to 5 mg BID on 05/25  Then 5 mg daily in 1 week        Adrenal insufficiency (HCC)  Assessment & Plan  · Continued with home dose hydrocortisone      Sebring syndrome  Assessment & Plan  - 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  - 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 with post operative ileus which eventually resolved and patient was [facility] on [facility]  - Monitor ileostomy output   - Monitor I&O     HLD (hyperlipidemia)  Assessment & Plan  Cont PTA statin    Type 2 diabetes mellitus, with long-term current use of insulin [facility])  Assessment & Plan  Lab Results   Component Value Date    HGBA1C 6 9 (H) 01/15/2022       Recent Labs     05/21/22  1113 05/21/22  1558 05/21/22  2058 05/22/22  0824   POCGLU 127 149* 195* 120       Blood Sugar Average: Last 72 hrs:  (P) 109 [id number]     · Home lantus dosing low  Cont dosing  VTE Pharmacologic Prophylaxis:   Moderate Risk (Score 3-4) - Pharmacological DVT Prophylaxis Ordered: enoxaparin (Lovenox)  Discussions with Specialists or Other Care Team Provider: discussed with trauma    Education and Discussions with Family / Patient: Patient declined call to   Time Spent for Care: 30 minutes   More than 50% of total time
spent on counseling and coordination of care as described above  Current Length of Stay: 2 day(s)  Current Patient Status: Inpatient   Certification Statement: The patient will continue to require additional inpatient hospital stay due to trauma  Discharge Plan: Anticipate discharge in 24-48 hrs to rehab facility  Code Status: Level 1 - Full Code    Subjective:   "they know where I want to go " we discuss his plan of rehab  He denies abd pain, headache, cp/sob  He has an ok appetite  Objective:     Vitals:   Temp (24hrs), Av 4 °F (36 9 °C), Min:98 1 °F (36 7 °C), Max:98 9 °F (37 2 °C)    Temp:  [98 1 °F (36 7 °C)-98 9 °F (37 2 °C)] 98 1 °F (36 7 °C)  HR:  [81-88] 86  Resp:  [16-18] 16  BP: ()/(63-92) 114/92  SpO2:  [95 %-96 %] 95 %  Body mass index is 26 44 kg/m²  Input and Output Summary (last 24 hours): Intake/Output Summary (Last 24 hours) at 2022 1200  Last data filed at 2022 1153  Gross per 24 hour   Intake 240 ml   Output 2110 ml   Net -1870 ml       Physical Exam:   Physical Exam  Vitals and nursing note reviewed  Constitutional:       Appearance: Normal appearance  He is not ill-appearing or diaphoretic  HENT:      Head: Normocephalic  Nose: No congestion  Mouth/Throat:      Mouth: Mucous membranes are moist       Pharynx: No oropharyngeal exudate  Eyes:      General: No scleral icterus  Conjunctiva/sclera: Conjunctivae normal    Pulmonary:      Effort: Pulmonary effort is normal  No respiratory distress  Abdominal:      General: Bowel sounds are normal  There is no distension  Palpations: Abdomen is soft  Tenderness: There is no abdominal tenderness  Comments: -colostomy draining brown stool    Ecchymosis at lateral regions of lower abd b/l    Healing ex-lap   Musculoskeletal:      Cervical back: Neck supple  No rigidity  Right lower leg: No edema  Left lower leg: No edema  Neurological:      Mental Status: He is alert 
Psychiatric:         Mood and Affect: Mood normal          Behavior: Behavior normal           Additional Data:     Labs:  Results from last 7 days   Lab Units 05/21/22  0531 05/20/22  0055   WBC Thousand/uL 10 23* 11 57*   HEMOGLOBIN g/dL 9 9* 10 0*   HEMATOCRIT % 31 4* 31 9*   PLATELETS Thousands/uL 406* 425*   BANDS PCT %  --  5   LYMPHO PCT %  --  18   MONO PCT %  --  5   EOS PCT %  --  2     Results from last 7 days   Lab Units 05/21/22  0531 05/20/22  0055   SODIUM mmol/L 134* 136   POTASSIUM mmol/L 4 2 4 3   CHLORIDE mmol/L 101 102   CO2 mmol/L 27 27   BUN mg/dL 13 16   CREATININE mg/dL 0 57* 0 67   ANION GAP mmol/L 6 7   CALCIUM mg/dL 8 0* 8 1*   ALBUMIN g/dL  --  2 5*   TOTAL BILIRUBIN mg/dL  --  0 46   ALK PHOS U/L  --  62   ALT U/L  --  18   AST U/L  --  17   GLUCOSE RANDOM mg/dL 104 117         Results from last 7 days   Lab Units 05/22/22  0824 05/21/22  2058 05/21/22  1558 05/21/22  1113 05/21/22  0746 05/20/22  2109 05/20/22  1540 05/20/22  1236 05/20/22  0939 05/18/22  1053 05/18/22  0717 05/18/22  0709   POC GLUCOSE mg/dl 120 195* 149* 127 108 266* 243* 221* 169* 127 91 91               Lines/Drains:  Invasive Devices  Report    Peripheral Intravenous Line  Duration           Peripheral IV 05/20/22 Left Antecubital 2 days          Drain  Duration           Ileostomy Standard (Laine, barrett) RLQ 23 days                      Imaging: Personally reviewed the following imaging: chest CT scan, abdominal/pelvic CT and CT head    abd CT  IMPRESSION:     1  No acute osseous abnormality      2   Prior laminectomies  Advanced degenerative changes      Recent Cultures (last 7 days):   Results from last 7 days   Lab Units 05/16/22  1025   URINE CULTURE  50,000-59,000 cfu/ml Enterococcus faecalis*  10,000-19,000 cfu/ml Pseudomonas aeruginosa*       Last 24 Hours Medication List:   Current Facility-Administered Medications   Medication Dose Route Frequency Provider Last Rate    acetaminophen  650 mg Oral Q6H
PRN Miranda Grapes, CRNP      aspirin  81 mg Oral Daily Miranda Grapes, CRNP      atorvastatin  40 mg Oral Daily Miranda Grapes, CRNP      enoxaparin  30 mg Subcutaneous Q12H Miranda Grapes, CRNP      finasteride  5 mg Oral Daily Miranda Grapes, CRNP      hydrocortisone  5 mg Oral BID Miranda Grapes, CRNP      insulin glargine  5 Units Subcutaneous HS Miranda Grapes, CRNP      insulin lispro  1-6 Units Subcutaneous TID AC Miranda Grapes, CRNP      meclizine  25 mg Oral Q8H PRN Miranda Grapes, CRNP      metoclopramide  5 mg Oral TID AC Miranda Grapes, CRNP      multivitamin stress formula  1 tablet Oral Daily Miranda Grapes, CRNP      nystatin   Topical BID Miranda Grapes, CRNP      oxybutynin  10 mg Oral Daily Miranda Grapes, CRNP      pantoprazole  40 mg Oral Daily Miranda Grapes, CRNP      pregabalin  75 mg Oral Daily With Breakfast [address] [place]      And    pregabalin  150 mg Oral HS [place], [place]      senna  1 tablet Oral Daily Miranda Grapes, CRNP      tamsulosin  0 4 mg Oral Early Morning Miranda Grapes, CRNP          Today, Patient Was Seen By: Rosalee Brown MD
DISPLAY PLAN FREE TEXT

## 2022-06-08 NOTE — ED PROVIDER NOTE - CONSTITUTIONAL, MLM
normal... Well appearing, well nourished, awake, alert, in respiratory distress Stelara Counseling:  I discussed with the patient the risks of ustekinumab including but not limited to immunosuppression, malignancy, posterior leukoencephalopathy syndrome, and serious infections.  The patient understands that monitoring is required including a PPD at baseline and must alert us or the primary physician if symptoms of infection or other concerning signs are noted.

## 2023-04-22 NOTE — ED ADULT NURSE REASSESSMENT NOTE - NS ED NURSE REASSESS COMMENT FT1
MD Ospina at bedside with U/S team.
Patient resting comfortably in bed at this time, awaiting bed on [address] [place]. Patient admitted to Tele. Heparin infusion running at this time.
Received report from CINDY Gan. Patient resting in bed, on 3L O2 via NC, receiving IV fluids for hypotension. Patient attached to pads, on monitor, HR in 90s at this time. Patient appears comfortable.
Straight cath performed to obtain sterile urine sample. 2nd RN present, sterile technique maintained, patient tolerated well.
Performed

## 2023-08-31 NOTE — ED ADULT NURSE NOTE - ASSIGNED BED LOCATION
Assessment  Syncopal episode, clinical scenario c/w sx hypotension with volume depletion  Significant facial/head trauma/SDH post syncope  CAd s/p remote multivessel PCI without evidence of angina nor ischemia  Ischemia Cm with stable EF 40-45% - no clinical evidence of CHF  PAF in SR  recurrent rectal carcinoma receiving chemotherapy  HLD    Rec  cont  statin  Cont low dose asa if cleared by neurosurgery  In light of persistent tachycardia on coreg would switch to toprol 50 mg day and titrate to BID  In light of sig trauma sustained with syncope and known CAD/PAF would recommend ILR implant to monitor closely as outpt - will arrange for today, pt agreeable 2dsu 242 D

## 2024-01-17 NOTE — PROGRESS NOTE ADULT - PROBLEM/PLAN-2
DISPLAY PLAN FREE TEXT Initiate Treatment: desonide 0.05 % topical cream \\nQuantity: 60.0 g  Days Supply: 30\\nSig: AAA twice daily Detail Level: Zone Initiate Treatment: betamethasone dipropionate 0.05 % topical cream \\nQuantity: 45.0 g  Days Supply: 30\\nSig: Apply to affected areas on hands not face.

## 2024-05-03 NOTE — ED ADULT NURSE NOTE - NS ED NURSE RECORD ANOTHER HT AND WT
I agree with the Resident's findings and plan, as documented in today's note.    Blair Catherine MD   No

## 2024-07-28 NOTE — DISCHARGE NOTE ADULT - BECAUSE OF A PHYSICAL, MENTAL OR EMOTIONAL CONDITION, DO YOU HAVE DIFFICULTY DOING  ERRANDS ALONE LIKE VISITING A DOCTOR'S OFFICE OR SHOPPING (15 YEARS AND OLDER)
Problem: At Risk for Falls  Goal: Patient does not fall  Outcome: Monitoring/Evaluating progress  Goal: Patient takes action to control fall-related risks  Outcome: Monitoring/Evaluating progress     Problem: At Risk for Injury Due to Fall  Goal: Patient does not fall  Outcome: Monitoring/Evaluating progress  Goal: Takes action to control condition specific risks  Outcome: Monitoring/Evaluating progress  Goal: Verbalizes understanding of fall-related injury personal risks  Description: Document education using the patient education activity  Outcome: Monitoring/Evaluating progress     Problem: Pain  Goal: Acceptable pain level achieved/maintained at rest using appropriate pain scale for the patient  Outcome: Monitoring/Evaluating progress  Goal: Acceptable pain level achieved/maintained with activity using appropriate pain scale for the patient  Outcome: Monitoring/Evaluating progress  Goal: Acceptable pain level achieved/maintained without oversedation  Outcome: Monitoring/Evaluating progress      Yes

## 2024-11-21 NOTE — PHYSICAL THERAPY INITIAL EVALUATION ADULT - LEVEL OF INDEPENDENCE: GAIT, REHAB EVAL
You can access the FollowMyHealth Patient Portal offered by [facility] by registering at the following website: [email or web address]. By joining [employer]’s FollowMyHealth portal, you will also be able to view your health information using other applications (apps) compatible with our system.
unable to take steps at this time

## 2025-03-26 NOTE — ED PROVIDER NOTE - GASTROINTESTINAL, MLM
Today :Rose Marie YAIR Hester had no medications administered during this visit.     For:   1. Chronic pain syndrome    2. Postlaminectomy syndrome, cervical         Your next appointment is due in:  See AVS        Please read the  Medication Guide that was given to you and reviewed during todays visit.     (Tell all doctors including dentists that you are taking this medication)     Go to the emergency room or call 911 if:  -You have signs of allergic reaction:   -Rash, hives, itching.   -Swollen, blistered, peeling skin.   -Swelling of face, lips, mouth, tongue or throat.   -Tightness of chest, trouble breathing, swallowing or talking     Call your doctor:  - If IV / injection site gets red, warm, swollen, itchy or leaks fluid or pus.     (Leave dressing on your IV site for at least 2 hours and keep area clean and dry  - If you get sick or have symptoms of infection or are not feeling well for any reason.    (Wash your hands often, stay away from people who are sick)  - If you have side effects from your medication that do not go away or are bothersome.     (Refer to the teaching your nurse gave you for side effects to call your doctor about)    - Common side effects may include:  stuffy nose, headache, feeling tired, muscle aches, upset stomach  - Before receiving any vaccines     - Call the [facility] at   If:  - You get sick, are on antibiotics, have had a recent vaccine, have surgery or dental work and your doctor wants your visit rescheduled.  - You need to cancel and reschedule your visit for any reason. Call at least 2 days before your visit if you need to cancel.   - Your insurance changes before your next visit.    (We will need to get approval from your new insurance. This can take up to two weeks.)     The [facility] is opened Monday thru Friday. We are closed on weekends and holidays.   Voice mail will take your call if the center is closed. If you leave a message please allow  24 hours for a call back during weekdays. If you leave a message on a weekend/holiday, we will call you back the next business day.    A pharmacist is available Monday - Friday from 8:30AM to 3:30PM to help answer any questions you may have about your prescriptions(s). Please call pharmacy at:    [facility]: [phone]  [facility]: [phone]  [facility]: [phone]              [facility] OUTPATIENT [place]      Pain Infusion Aftercare Instructions      1. It is normal to feel sedated, tired and low in energy after a pain infusion. DO NOT DRIVE, OPERATE ANY MACHINERY, OR MAKE ANY IMPORTANT DECISIONS FOR AT LEAST 24 HOURS AFTER THE INFUSION.     2. Call the pain center at [phone] with any problems, questions, or concerns.     3. Eat light after the infusion. If you feel queasy or sick to your stomach, laying down with your eyes closed may help. When you resume eating start with something mild like clear liquids, yogurt, applesauce, crackers, etc… Gradually advance to a regular diet.     4. Do not leave your house alone the evening of your pain infusion.     5. No alcohol or sedative medications, such as sleeping pills, for 24 hours after your pain infusion.     6. Resume all other prescribed medications unless directed otherwise by you physician.     7. If you have any medical emergencies, call 911 or go directly to the closest emergency room.   Abdomen soft, non-tender, no guarding.